# Patient Record
Sex: FEMALE | Race: WHITE | NOT HISPANIC OR LATINO | Employment: FULL TIME | URBAN - METROPOLITAN AREA
[De-identification: names, ages, dates, MRNs, and addresses within clinical notes are randomized per-mention and may not be internally consistent; named-entity substitution may affect disease eponyms.]

---

## 2017-03-30 ENCOUNTER — MYC MEDICAL ADVICE (OUTPATIENT)
Dept: INTERNAL MEDICINE | Facility: CLINIC | Age: 62
End: 2017-03-30

## 2017-09-27 DIAGNOSIS — E03.9 ACQUIRED HYPOTHYROIDISM: ICD-10-CM

## 2017-09-27 RX ORDER — LEVOTHYROXINE SODIUM 150 UG/1
TABLET ORAL
Qty: 90 TABLET | Refills: 0 | Status: SHIPPED | OUTPATIENT
Start: 2017-09-27 | End: 2017-10-06

## 2017-09-27 NOTE — TELEPHONE ENCOUNTER
Levo      Last Written Prescription Date: 9/30/16  Last Quantity: 90, # refills: 3  Last Office Visit with G, P or [facility] prescribing provider: 9/30/16   Next 5 appointments (look out 90 days)     Oct 06, 2017 10:00 AM CDT   MyChart Physical Adult with Ernestina Figueroa MD   [facility] ([facility]    [address]  [facility] [address]   [phone]                   TSH   Date Value Ref Range Status   09/30/2016 0.49 0.40 - 4.00 mU/L Final

## 2017-10-06 ENCOUNTER — OFFICE VISIT (OUTPATIENT)
Dept: INTERNAL MEDICINE | Facility: CLINIC | Age: 62
End: 2017-10-06
Payer: COMMERCIAL

## 2017-10-06 ENCOUNTER — HOSPITAL ENCOUNTER (OUTPATIENT)
Dept: MAMMOGRAPHY | Facility: CLINIC | Age: 62
Discharge: HOME | End: 2017-10-06
Attending: INTERNAL MEDICINE | Admitting: INTERNAL MEDICINE
Payer: COMMERCIAL

## 2017-10-06 VITALS
WEIGHT: 152.1 LBS | DIASTOLIC BLOOD PRESSURE: 76 MMHG | OXYGEN SATURATION: 96 % | HEIGHT: 67 IN | RESPIRATION RATE: 16 BRPM | BODY MASS INDEX: 23.87 KG/M2 | SYSTOLIC BLOOD PRESSURE: 102 MMHG | TEMPERATURE: 98.2 F | HEART RATE: 66 BPM

## 2017-10-06 DIAGNOSIS — Z00.00 ENCOUNTER FOR ROUTINE ADULT HEALTH EXAMINATION WITHOUT ABNORMAL FINDINGS: Primary | ICD-10-CM

## 2017-10-06 DIAGNOSIS — E03.9 ACQUIRED HYPOTHYROIDISM: ICD-10-CM

## 2017-10-06 DIAGNOSIS — Z12.31 VISIT FOR SCREENING MAMMOGRAM: ICD-10-CM

## 2017-10-06 DIAGNOSIS — C50.911 MALIGNANT NEOPLASM OF RIGHT FEMALE BREAST, UNSPECIFIED ESTROGEN RECEPTOR STATUS, UNSPECIFIED SITE OF BREAST (H): ICD-10-CM

## 2017-10-06 DIAGNOSIS — R10.13 EPIGASTRIC PAIN: ICD-10-CM

## 2017-10-06 PROCEDURE — 36415 COLL VENOUS BLD VENIPUNCTURE: CPT | Performed by: INTERNAL MEDICINE

## 2017-10-06 PROCEDURE — 80061 LIPID PANEL: CPT | Performed by: INTERNAL MEDICINE

## 2017-10-06 PROCEDURE — 99396 PREV VISIT EST AGE 40-64: CPT | Performed by: INTERNAL MEDICINE

## 2017-10-06 PROCEDURE — 84443 ASSAY THYROID STIM HORMONE: CPT | Performed by: INTERNAL MEDICINE

## 2017-10-06 PROCEDURE — 99213 OFFICE O/P EST LOW 20 MIN: CPT | Mod: 25 | Performed by: INTERNAL MEDICINE

## 2017-10-06 PROCEDURE — G0202 SCR MAMMO BI INCL CAD: HCPCS | Mod: 52

## 2017-10-06 PROCEDURE — 80048 BASIC METABOLIC PNL TOTAL CA: CPT | Performed by: INTERNAL MEDICINE

## 2017-10-06 RX ORDER — LEVOTHYROXINE SODIUM 150 UG/1
150 TABLET ORAL DAILY
Qty: 90 TABLET | Refills: 3 | Status: SHIPPED | OUTPATIENT
Start: 2017-10-06 | End: 2018-10-04

## 2017-10-06 NOTE — MR AVS SNAPSHOT
After Visit Summary   10/6/2017    Lety Baldwin    MRN: [id number]           Patient Information     Date Of Birth          1955        Visit Information        Provider Department      10/6/2017 10:00 AM Ernestina Figueroa MD [facility]        Today's Diagnoses     Encounter for routine adult health examination without abnormal findings    -  1    Epigastric pain        Acquired hypothyroidism        Need for prophylactic vaccination and inoculation against influenza          Care Instructions      PREVENTIVE HEALTH RECOMMENDATIONS:     Vaccines: Get a flu shot each year. Get a tetanus shot every 10 years.     Exercise for at least 150 minutes a week (an average of 30 minutes a day, 5 days of the week). This will help you control your weight and prevent disease.    Limit alcohol to one drink per day.    No smoking.     Wear sunscreen to prevent skin cancer.     See your dentist twice a year for an exam and cleaning.    Try to get Calcium 1200 mg total per day. It is best to not take it all at once. Try to get Vitamin D at least 1000 units per day.    BMI or Body Mass Index is a way of indicating weight and health risk for cardiovascular diseases, high blood pressure, diabetes.   Definitions:    Underweight is less than 18.5 and will be associated with health risk.   Normal BMI is 18.5 to 25   Overweight is 25-29   Obesity is 30 or greater   Morbid Obesity is 40 or greater or 35 or greater with diabetes or high blood pressure  Obesity and Morbid Obesity are associated with higher health risks. Lowering calories, exercising more may lower your BMI and even small decreases can have positive impact on lowering health risks.   Your Body mass index is 24 kg/(m^2)..             Follow-ups after your visit        Your next 10 appointments already scheduled     Oct 06, 2017 12:05 PM CDT   MA SCREEN WITH IMPLANTS DIGITAL BILAT with [id number]   [facility] ([facility])  "   [address] [facility], [address]  [facility] [id number] [phone]           Do not use any powder, lotion or deodorant under your arms or on your breast. If you do, we will ask you to remove it before your exam.  Wear comfortable, two-piece clothing.  If you have any allergies, tell your care team.  Bring any previous mammograms from other facilities or have them mailed to the breast center. Three-dimensional (3D) mammograms are available at [facility] locations in [facility], [facility], [facility], and [place]. [facility] locations include [place] and [facility] in [place]. Benefits of 3D mammograms include: - Improved rate of cancer detection - Decreases your chance of having to go back for more tests, which means fewer: - \"False-positive\" results (This means that there is an abnormal area but it isn't cancer.) - Invasive testing procedures, such as a biopsy or surgery - Can provide clearer images of the breast if you have dense breast tissue. 3D mammography is an optional exam that anyone can have with a 2D mammogram. It doesn't replace or take the place of a 2D mammogram. 2D mammograms remain an effective screening test for all women.  Not all insurance companies cover the cost of a 3D mammogram. Check with your insurance.              Future tests that were ordered for you today     Open Future Orders        Priority Expected Expires Ordered    US Abdomen Limited Routine  10/6/2018 10/6/2017            Who to contact     If you have questions or need follow up information about today's clinic visit or your schedule please contact [facility] directly at [phone].  Normal or non-critical lab and imaging results will be communicated to you by MyChart, letter or phone within 4 business days after the clinic has received the results. If you do not hear from us within 7 days, please contact the clinic through MyChart or phone. If " "you have a critical or abnormal lab result, we will notify you by phone as soon as possible.  Submit refill requests through [employer] or call your pharmacy and they will forward the refill request to us. Please allow 3 business days for your refill to be completed.          Additional Information About Your Visit        SmartEx[employer]ehart Information     [employer] gives you secure access to your electronic health record. If you see a primary care provider, you can also send messages to your care team and make appointments. If you have questions, please call your primary care clinic.  If you do not have a primary care provider, please call [phone] and they will assist you.        Care EveryWhere ID     This is your Care EveryWhere ID. This could be used by other organizations to access your [facility] medical records  [id number]        Your Vitals Were     Pulse Temperature Respirations Height Last Period Pulse Oximetry    66 98.2  F (36.8  C) (Oral) 16 5' 6.75\" (1.695 m) 01/01/2004 96%    BMI (Body Mass Index)                   24 kg/m2            Blood Pressure from Last 3 Encounters:   10/06/17 102/76   10/10/16 110/82   09/30/16 110/80    Weight from Last 3 Encounters:   10/06/17 152 lb 1.6 oz (69 kg)   10/10/16 156 lb (70.8 kg)   09/30/16 156 lb (70.8 kg)              We Performed the Following     Basic metabolic panel     FLU VAC, SPLIT VIRUS IM > 3 YO (QUADRIVALENT) [14348]     Lipid panel reflex to direct LDL     TSH with free T4 reflex     Vaccine Administration, Initial [51916]          Today's Medication Changes          These changes are accurate as of: 10/6/17 10:53 AM.  If you have any questions, ask your nurse or doctor.               Stop taking these medicines if you haven't already. Please contact your care team if you have questions.     Selenium 200 MCG Tabs   Stopped by:  Ernestina Figueroa MD                    Primary Care Provider Office Phone # Fax #    Ernestina Figueroa [phone]       [address] [facility] [address]  [facility] [address]        Equal Access to Services     LILIAKATERIN DYLAN : Hadii lorena eubanks hadmariangelyanni Sogray, waaxda luqadaha, qaybta kaalmada emanuelwilliamethan, jessica husaingonzaloslick bailey. So [facility] [phone].    ATENCIÓN: Si habla español, tiene a ibrahim disposición servicios gratuitos de asistencia lingüística. Llame al [phone].    We comply with applicable federal civil rights laws and [place] laws. We do not discriminate on the basis of race, color, national origin, age, disability, sex, sexual orientation, or gender identity.            Thank you!     Thank you for choosing [facility]  for your care. Our goal is always to provide you with excellent care. Hearing back from our patients is one way we can continue to improve our services. Please take a few minutes to complete the written survey that you may receive in the mail after your visit with us. Thank you!             Your Updated Medication List - Protect others around you: Learn how to safely use, store and throw away your medicines at www.disposemymeds.org.          This list is accurate as of: 10/6/17 10:53 AM.  Always use your most recent med list.                   Brand Name Dispense Instructions for use Diagnosis    ascorbic acid 250 MG tablet    VITAMIN C     1 TABLET 3 TIMES DAILY        levothyroxine 150 MCG tablet    SYNTHROID/LEVOTHROID    90 tablet    TAKE 1 TABLET (150 MCG) BY MOUTH DAILY    Acquired hypothyroidism       naproxen 500 MG tablet    NAPROSYN    60 Tab    ONE TABLET TWICE DAILY WITH FOOD as needed    Knee pain, Elbow pain       VIT CALCIUM CITRATE + D TABS 250-62.5 MG-IU OR

## 2017-10-06 NOTE — NURSING NOTE
"Chief Complaint   Patient presents with     Physical     fasting       Initial /76  Pulse 66  Temp 98.2  F (36.8  C) (Oral)  Resp 16  Ht 5' 6.75\" (1.695 m)  Wt 152 lb 1.6 oz (69 kg)  LMP 01/01/2004  SpO2 96%  BMI 24 kg/m2 Estimated body mass index is 24 kg/(m^2) as calculated from the following:    Height as of this encounter: 5' 6.75\" (1.695 m).    Weight as of this encounter: 152 lb 1.6 oz (69 kg).  Medication Reconciliation: complete      René Hobbs, CHLOE      Pt schedule for mammogram after physical today.     "

## 2017-10-06 NOTE — PATIENT INSTRUCTIONS
PREVENTIVE HEALTH RECOMMENDATIONS:     Vaccines: Get a flu shot each year. Get a tetanus shot every 10 years.     Exercise for at least 150 minutes a week (an average of 30 minutes a day, 5 days of the week). This will help you control your weight and prevent disease.    Limit alcohol to one drink per day.    No smoking.     Wear sunscreen to prevent skin cancer.     See your dentist twice a year for an exam and cleaning.    Try to get Calcium 1200 mg total per day. It is best to not take it all at once. Try to get Vitamin D at least 1000 units per day.    BMI or Body Mass Index is a way of indicating weight and health risk for cardiovascular diseases, high blood pressure, diabetes.   Definitions:    Underweight is less than 18.5 and will be associated with health risk.   Normal BMI is 18.5 to 25   Overweight is 25-29   Obesity is 30 or greater   Morbid Obesity is 40 or greater or 35 or greater with diabetes or high blood pressure  Obesity and Morbid Obesity are associated with higher health risks. Lowering calories, exercising more may lower your BMI and even small decreases can have positive impact on lowering health risks.   Your Body mass index is 24 kg/(m^2)..

## 2017-10-06 NOTE — PROGRESS NOTES
SUBJECTIVE:   CC: Lety Baldwin is an 62 year old woman who presents for preventive health visit.     Physical   Annual:     Getting at least 3 servings of Calcium per day::  Yes    Bi-annual eye exam::  Yes    Dental care twice a year::  Yes    Sleep apnea or symptoms of sleep apnea::  None    Diet::  Regular (no restrictions)    Frequency of exercise::  4-5 days/week    Duration of exercise::  30-45 minutes    Taking medications regularly::  Yes    Medication side effects::  None    Additional concerns today::  YES    Current concerns:   She has had about 4 episodes of significant epigastric pain the past year. She had one in [place] a few years ago. It is steady pain across the epigastrium, lasts 1-3 hours. No nausea or vomiting. It seems an hour or more after eating. No other specific triggers.         Today's PHQ-2 Score: PHQ-2 ( 1999 Pfizer) 10/3/2017   Q1: Little interest or pleasure in doing things 0   Q2: Feeling down, depressed or hopeless 0   PHQ-2 Score 0   Q1: Little interest or pleasure in doing things Not at all   Q2: Feeling down, depressed or hopeless Not at all   PHQ-2 Score 0       Abuse: Current or Past(Physical, Sexual or Emotional)- No  Do you feel safe in your environment - Yes    Social History   Substance Use Topics     Smoking status: Never Smoker     Smokeless tobacco: Never Used     Alcohol use No     The patient does not drink >3 drinks per day nor >7 drinks per week.    Reviewed orders with patient.  Reviewed health maintenance and updated orders accordingly - Yes      Patient over age 50, yearly due to breast cancer history      Pertinent mammograms are reviewed under the imaging tab.  History of abnormal Pap smear: NO - age 30-65 PAP every 5 years with negative HPV co-testing recommended    Reviewed and updated as needed this visit by clinical staff         Reviewed and updated as needed this visit by Provider        Patient Active Problem List   Diagnosis     Personal history of  "malignant neoplasm of breast     Acquired hypothyroidism     Disorder of bone and cartilage     CARDIOVASCULAR SCREENING; LDL GOAL LESS THAN 160     Advanced directives, counseling/discussion     Current Outpatient Prescriptions   Medication Sig Dispense Refill     levothyroxine (SYNTHROID/LEVOTHROID) 150 MCG tablet TAKE 1 TABLET (150 MCG) BY MOUTH DAILY 90 tablet 0     NAPROXEN 500 MG PO TABS ONE TABLET TWICE DAILY WITH FOOD as needed 60 Tab 0     VIT CALCIUM CITRATE + D TABS 250-62.5 MG-IU OR        VITAMIN C 250 MG OR TABS 1 TABLET 3 TIMES DAILY        Review Of Systems:  Skin: negative  Eyes: negative  Ears/Nose/Throat: negative  Respiratory: No dyspnea on exertion and No cough  Cardiovascular: negative  Gastrointestinal: as above  Genitourinary: negative  Musculoskeletal: negative  Neurologic: negative  Psychiatric: negative  Hematologic/Lymphatic/Immunologic: negative  Endocrine: negative   Gynecologic ros reveals:no breast pain or new or enlarging lumps on self exam, no vaginal bleeding, no discharge or pelvic pain    Objective:  Patient alert, in no acute distress  /76  Pulse 66  Temp 98.2  F (36.8  C) (Oral)  Resp 16  Ht 5' 6.75\" (1.695 m)  Wt 152 lb 1.6 oz (69 kg)  LMP 01/01/2004  SpO2 96%  BMI 24 kg/m2    HEENT: extraocular movements are intact, pupils equal and reactive to light and accommodation, TMs clear, oropharynx clear  NECK: Neck supple. No adenopathy. Thyroid symmetric, normal size,, Carotids without bruits.  PULMONARY: clear to auscultation  CARDIAC: regular rate and rhythm and no murmurs, clicks, or gallops  PULSES: 2/2 throughout  BACK: no spinal or CVAT  ABDOMINAL: Soft, nontender.  Normal bowel sounds.  No hepatosplenomegaly or abnormal masses  BREAST: Right sugically absent, implant intact, left no breast masses or tenderness, implant intact,  No axillary masses or tenderness and No galactorrhea  PELVIC: external genitalia normal, , bimanual exam with normal uterus and " "adnexa  REFLEXES: 2+ throughout  SKIN: unremarkable       ASSESSMENT/PLAN:   1. Encounter for routine adult health examination without abnormal findings    - Lipid panel reflex to direct LDL  - Basic metabolic panel    2. Breast cancer:   No evidence of recurrence, annual mammogram    3.  Epigastric pain  Advised her symptoms could be gallstones, the pain is very suggestive, timing after eating. Discussed gallstones, relation to fats, complications. Will do US but if symptoms are very infrequent may not refer to surgery yet. Avoid fatty foods. Advised ED for severe, unremitting pain, fever associated with the pain.   - US Abdomen Limited; Future    3. Acquired hypothyroidism  recheck  - TSH with free T4 reflex    COUNSELING:  Reviewed preventive health counseling, as reflected in patient instructions       Osteoporosis Prevention/Bone Health         reports that she has never smoked. She has never used smokeless tobacco.    Estimated body mass index is 24.43 kg/(m^2) as calculated from the following:    Height as of 10/10/16: 5' 7\" (1.702 m).    Weight as of 10/10/16: 156 lb (70.8 kg).         Counseling Resources:  ATP IV Guidelines  Pooled Cohorts Equation Calculator  Breast Cancer Risk Calculator  FRAX Risk Assessment  ICSI Preventive Guidelines  Dietary Guidelines for Americans, 2010  [employer]'s MyPlate  ASA Prophylaxis  Lung CA Screening    Ernestina Figueroa MD  [facility] for HPI/ROS submitted by the patient on 10/3/2017   PHQ-2 Score: 0        "

## 2017-10-07 LAB
ANION GAP SERPL CALCULATED.3IONS-SCNC: 9 MMOL/L (ref 3–14)
BUN SERPL-MCNC: 12 MG/DL (ref 7–30)
CALCIUM SERPL-MCNC: 9.2 MG/DL (ref 8.5–10.1)
CHLORIDE SERPL-SCNC: 103 MMOL/L (ref 94–109)
CHOLEST SERPL-MCNC: 155 MG/DL
CO2 SERPL-SCNC: 28 MMOL/L (ref 20–32)
CREAT SERPL-MCNC: 0.64 MG/DL (ref 0.52–1.04)
GFR SERPL CREATININE-BSD FRML MDRD: >90 ML/MIN/1.7M2
GLUCOSE SERPL-MCNC: 90 MG/DL (ref 70–99)
HDLC SERPL-MCNC: 80 MG/DL
LDLC SERPL CALC-MCNC: 61 MG/DL
NONHDLC SERPL-MCNC: 75 MG/DL
POTASSIUM SERPL-SCNC: 3.9 MMOL/L (ref 3.4–5.3)
SODIUM SERPL-SCNC: 140 MMOL/L (ref 133–144)
TRIGL SERPL-MCNC: 68 MG/DL
TSH SERPL DL<=0.005 MIU/L-ACNC: 0.91 MU/L (ref 0.4–4)

## 2017-10-10 ENCOUNTER — HOSPITAL ENCOUNTER (OUTPATIENT)
Dept: ULTRASOUND IMAGING | Facility: CLINIC | Age: 62
Discharge: HOME | End: 2017-10-10
Attending: INTERNAL MEDICINE | Admitting: INTERNAL MEDICINE
Payer: COMMERCIAL

## 2017-10-10 DIAGNOSIS — R10.13 EPIGASTRIC PAIN: ICD-10-CM

## 2017-10-10 PROCEDURE — 76705 ECHO EXAM OF ABDOMEN: CPT

## 2017-10-24 ENCOUNTER — MYC MEDICAL ADVICE (OUTPATIENT)
Dept: INTERNAL MEDICINE | Facility: CLINIC | Age: 62
End: 2017-10-24

## 2017-10-24 ENCOUNTER — TELEPHONE (OUTPATIENT)
Dept: INTERNAL MEDICINE | Facility: CLINIC | Age: 62
End: 2017-10-24

## 2017-10-24 DIAGNOSIS — K80.20 CALCULUS OF GALLBLADDER WITHOUT CHOLECYSTITIS WITHOUT OBSTRUCTION: Primary | ICD-10-CM

## 2017-10-24 NOTE — TELEPHONE ENCOUNTER
Patient called regarding (reason for call): Gallbladder concerns-states pain this morning which has now subsided. Would like Dr. Figueroa to advise on next steps such as surgeon referral and any course of action to take during pain.  Is this regarding a medication?: no  If yes, which medication?: na  Pt Provider?: Dr. Figueroa  Phone Number Pt can be reached at: [phone]  Best Time: any  Can we leave a detailed message on this number?: yes    Central Scheduling  Marc SCOTT

## 2017-10-30 ENCOUNTER — OFFICE VISIT (OUTPATIENT)
Dept: SURGERY | Facility: CLINIC | Age: 62
End: 2017-10-30
Payer: COMMERCIAL

## 2017-10-30 VITALS
HEART RATE: 66 BPM | SYSTOLIC BLOOD PRESSURE: 128 MMHG | HEIGHT: 67 IN | WEIGHT: 152 LBS | DIASTOLIC BLOOD PRESSURE: 80 MMHG | OXYGEN SATURATION: 96 % | BODY MASS INDEX: 23.86 KG/M2

## 2017-10-30 DIAGNOSIS — K81.1 CHRONIC CHOLECYSTITIS: Primary | ICD-10-CM

## 2017-10-30 PROCEDURE — 99204 OFFICE O/P NEW MOD 45 MIN: CPT | Performed by: SURGERY

## 2017-10-30 NOTE — PROGRESS NOTES
Chief complaint:  Abdominal pain, epigastric    HPI:  This patient is a 62 year old  female who presents with epigastric region right upper quadrant abdominal pain for the past 2 years.  The pain is intermittent.  It occurs about 4 times peer year and lasts a few hours.     Past Medical History:   has a past medical history of Disorder of bone and cartilage, unspecified (2004); Fracture of metatarsal bone of right foot (5/15); Malignant neoplasm of breast (female), unspecified site (8/97); and Other specified acquired hypothyroidism.    Past Surgical History:  Past Surgical History:   Procedure Laterality Date     C NONSPECIFIC PROCEDURE  9/00/97    Right Total Mastectomy- Reconstructive Surgery. Abstracted 5/15/02.     COLONOSCOPY N/A 10/8/2014    Procedure: COLONOSCOPY;  Surgeon: Jelani Moran MD;  Location: [facility]     HC BIOPSY OF BREAST, OPEN INCISIONAL  1992    Rt     HC DILATION/CURETTAGE DIAG/THER NON OB  1995    D & C        Social History:  Social History     Social History     Marital status:      Spouse name: N/A     Number of children: 2     Years of education: N/A     Occupational History           Social History Main Topics     Smoking status: Never Smoker     Smokeless tobacco: Never Used     Alcohol use No     Drug use: No     Sexual activity: Yes     Partners: Male     Birth control/ protection: Surgical      Comment:  has had 2 vas.     Other Topics Concern     Exercise Yes     Seat Belt Yes     Self-Exams Yes     Social History Narrative        Family History:  Family History   Problem Relation Age of Onset     DIABETES Father      Hypertension Mother      Alzheimer Disease Mother      Lipids Mother      Cancer - colorectal Mother      Colon Cancer Mother 78     Arthritis Maternal Grandmother      C.A.D. Maternal Grandmother      Cancer - colorectal Maternal Grandfather      Alzheimer Disease Maternal Grandfather      CEREBROVASCULAR DISEASE Paternal  Grandmother        Review of Systems:  The 10 point Review of Systems is negative other than noted in the HPI and above.    Physical Exam:  General - This is a well developed, well nourished female in no apparent distress.  HEENT - Normocephalic, atraumatic.  NO scleral icterus.  Neck - supple without masses  Lungs - respirations regular and non-labored.    Abdomen:   soft, non-distended with no tenderness noted. no masses palpated.  Extremities - warm without edema  Neurologic - nonfocal    Relevant labs:  Liver function panel- normal  WBC- normal  Lipase- normal    Imaging:  Ultrasound RUQ: positive cholelithiasis (multiple, small), positive gallbladder wall thickening (0.4 cm), negative ductal dilatation (0.3 cm), negative pericholecystic fluid    Assessment and Plan:  It is my impression that she has cholelithiasis and cholecystitis.  I have offered her a Laparoscopic cholecystectomy with cholangiograms.  We have discussed the indication, risks and expected recovery.  Risks discussed included duct/ organ injury, conversion to open surgery with increased recovery, post cholecystectomy syndromes and their treatment.   We also discussed alternatives including dissolution, lithotripsy and low fat diet. Literature was given to review.  We will work on scheduling surgery at the patient s convenience.    Peter Winslow MD  [facility] [place]    Please route or send letter to:  Primary Care Provider (PCP) and Include Progress Note

## 2017-10-30 NOTE — LETTER
2017    Re: Lety Baldwin - 1955    This patient is a 62 year old  female who presents with epigastric region right upper quadrant abdominal pain for the past 2 years.  The pain is intermittent.  It occurs about 4 times peer year and lasts a few hours.      Past Medical History:  has a past medical history of Disorder of bone and cartilage, unspecified (); Fracture of metatarsal bone of right foot (5/15); Malignant neoplasm of breast (female), unspecified site (); and Other specified acquired hypothyroidism.     Physical Exam:  General - This is a well developed, well nourished female in no apparent distress.  HEENT - Normocephalic, atraumatic.  NO scleral icterus.  Neck - supple without masses  Lungs - respirations regular and non-labored.    Abdomen:- soft, non-distended with no tenderness noted. no masses palpated.  Extremities - warm without edema  Neurologic - nonfocal     Relevant labs:  Liver function panel- normal  WBC- normal  Lipase- normal     Imaging:  Ultrasound RUQ: positive cholelithiasis (multiple, small), positive gallbladder wall thickening (0.4 cm), negative ductal dilatation (0.3 cm), negative pericholecystic fluid     Assessment and Plan:  It is my impression that she has cholelithiasis and cholecystitis.  I have offered her a Laparoscopic cholecystectomy with cholangiograms.  We have discussed the indication, risks and expected recovery.  Risks discussed included duct/ organ injury, conversion to open surgery with increased recovery, post cholecystectomy syndromes and their treatment.   We also discussed alternatives including dissolution, lithotripsy and low fat diet. Literature was given to review.  We will work on scheduling surgery at the patient s convenience.     Peter Winslow MD

## 2017-10-30 NOTE — PROGRESS NOTES
HPI      ROS (Review of Systems):      Positive for thyroid problem.          Physical Exam

## 2017-10-30 NOTE — MR AVS SNAPSHOT
"              After Visit Summary   10/30/2017    Lety Baldwin    MRN: [id number]           Patient Information     Date Of Birth          1955        Visit Information        Provider Department      10/30/2017 10:30 AM Petre Winslow MD [facility] General Surgery      Today's Diagnoses     Chronic cholecystitis    -  1       Follow-ups after your visit        Who to contact     If you have questions or need follow up information about today's clinic visit or your schedule please contact [facility] directly at [phone].  Normal or non-critical lab and imaging results will be communicated to you by [employer] Yourself[employer], letter or phone within 4 business days after the clinic has received the results. If you do not hear from us within 7 days, please contact the clinic through Lumicst or phone. If you have a critical or abnormal lab result, we will notify you by phone as soon as possible.  Submit refill requests through [email or web address] or call your pharmacy and they will forward the refill request to us. Please allow 3 business days for your refill to be completed.          Additional Information About Your Visit        MyChart Information     [email or web address] gives you secure access to your electronic health record. If you see a primary care provider, you can also send messages to your care team and make appointments. If you have questions, please call your primary care clinic.  If you do not have a primary care provider, please call [phone] and they will assist you.        Care EveryWhere ID     This is your Care EveryWhere ID. This could be used by other organizations to access your [place] medical records  [id number]        Your Vitals Were     Pulse Height Last Period Pulse Oximetry Breastfeeding? BMI (Body Mass Index)    66 5' 6.75\" (1.695 m) 01/01/2004 96% No 23.99 kg/m2       Blood Pressure from Last 3 Encounters:   10/30/17 128/80   10/06/17 102/76 "   10/10/16 110/82    Weight from Last 3 Encounters:   10/30/17 152 lb (68.9 kg)   10/06/17 152 lb 1.6 oz (69 kg)   10/10/16 156 lb (70.8 kg)              Today, you had the following     No orders found for display       Primary Care Provider Office Phone # Fax #    Ernestina Figueroa [phone]       [address]  [facility] [address]        Equal Access to Services     [facility] : Hadii aad ku hadasho Soomaali, waaxda luqadaha, qaybta kaalmada adeegyada, waxay idiin hayaan adeeg rodriguearaslick lajerzy . So [facility] [phone].    ATENCIÓN: Si habla español, tiene a ibrahim disposición servicios gratuitos de asistencia lingüística. [facility] [phone].    We comply with applicable federal civil rights laws and [place] laws. We do not discriminate on the basis of race, color, national origin, age, disability, sex, sexual orientation, or gender identity.            Thank you!     Thank you for choosing [facility]  for your care. Our goal is always to provide you with excellent care. Hearing back from our patients is one way we can continue to improve our services. Please take a few minutes to complete the written survey that you may receive in the mail after your visit with us. Thank you!             Your Updated Medication List - Protect others around you: Learn how to safely use, store and throw away your medicines at www.disposemymeds.org.          This list is accurate as of: 10/30/17 11:25 AM.  Always use your most recent med list.                   Brand Name Dispense Instructions for use Diagnosis    ascorbic acid 250 MG tablet    VITAMIN C     1 TABLET 3 TIMES DAILY        levothyroxine 150 MCG tablet    SYNTHROID/LEVOTHROID    90 tablet    Take 1 tablet (150 mcg) by mouth daily    Acquired hypothyroidism       naproxen 500 MG tablet    NAPROSYN    60 Tab    ONE TABLET TWICE DAILY WITH FOOD as needed    Knee pain, Elbow pain       VIT CALCIUM CITRATE + D TABS 250-62.5 MG-IU  OR

## 2018-01-11 ENCOUNTER — TRANSFERRED RECORDS (OUTPATIENT)
Dept: HEALTH INFORMATION MANAGEMENT | Facility: CLINIC | Age: 63
End: 2018-01-11

## 2018-01-16 ENCOUNTER — OFFICE VISIT (OUTPATIENT)
Dept: INTERNAL MEDICINE | Facility: CLINIC | Age: 63
End: 2018-01-16
Payer: COMMERCIAL

## 2018-01-16 VITALS
DIASTOLIC BLOOD PRESSURE: 62 MMHG | RESPIRATION RATE: 16 BRPM | HEART RATE: 82 BPM | TEMPERATURE: 97.9 F | BODY MASS INDEX: 24.25 KG/M2 | WEIGHT: 154.5 LBS | OXYGEN SATURATION: 100 % | SYSTOLIC BLOOD PRESSURE: 122 MMHG | HEIGHT: 67 IN

## 2018-01-16 DIAGNOSIS — C50.911 MALIGNANT NEOPLASM OF RIGHT FEMALE BREAST, UNSPECIFIED ESTROGEN RECEPTOR STATUS, UNSPECIFIED SITE OF BREAST (H): ICD-10-CM

## 2018-01-16 DIAGNOSIS — K81.1 CHRONIC CHOLECYSTITIS: ICD-10-CM

## 2018-01-16 DIAGNOSIS — Z01.818 PREOP GENERAL PHYSICAL EXAM: Primary | ICD-10-CM

## 2018-01-16 PROCEDURE — 99214 OFFICE O/P EST MOD 30 MIN: CPT | Performed by: INTERNAL MEDICINE

## 2018-01-16 PROCEDURE — 93000 ELECTROCARDIOGRAM COMPLETE: CPT | Performed by: INTERNAL MEDICINE

## 2018-01-16 RX ORDER — CEFAZOLIN SODIUM 1 G/3ML
1 INJECTION, POWDER, FOR SOLUTION INTRAMUSCULAR; INTRAVENOUS SEE ADMIN INSTRUCTIONS
Status: CANCELLED | OUTPATIENT
Start: 2018-01-16

## 2018-01-16 NOTE — MR AVS SNAPSHOT
After Visit Summary   1/16/2018    Lety Baldwin    MRN: [id number]           Patient Information     Date Of Birth          1955        Visit Information        Provider Department      1/16/2018 8:20 AM Ernestina Figueroa MD [facility]        Today's Diagnoses     Preop general physical exam    -  1      Care Instructions      Before Your Surgery      Call your surgeon if there is any change in your health. This includes signs of a cold or flu (such as a sore throat, runny nose, cough, rash or fever).    Do not smoke, drink alcohol or take over the counter medicine (unless your surgeon or primary care doctor tells you to) for the 24 hours before and after surgery.    If you take prescribed drugs: Follow your doctor s orders about which medicines to take and which to stop until after surgery.    Eating and drinking prior to surgery: follow the instructions from your surgeon    Take a shower or bath the night before surgery. Use the soap your surgeon gave you to gently clean your skin. If you do not have soap from your surgeon, use your regular soap. Do not shave or scrub the surgery site.  Wear clean pajamas and have clean sheets on your bed.           Follow-ups after your visit        Your next 10 appointments already scheduled     Jan 18, 2018   Procedure with Derrell Diaz MD   [facility] PeriOP [facility] (--)    [address]  [facility] [address]   [phone]            Jan 18, 2018 11:30 AM CST   [facility] Same Day Surgery with Peter Winslow MD, Jennifer Vallejo PA-C   [facility] ([facility])    [facility])   [phone]              Who to contact     If you have questions or need follow up information about today's clinic visit or your schedule please contact [facility] directly at [phone].  Normal or non-critical  "lab and imaging results will be communicated to you by MyChart, letter or phone within 4 business days after the clinic has received the results. If you do not hear from us within 7 days, please contact the clinic through Sp[employer] or phone. If you have a critical or abnormal lab result, we will notify you by phone as soon as possible.  Submit refill requests through Vocalytics or call your pharmacy and they will forward the refill request to us. Please allow 3 business days for your refill to be completed.          Additional Information About Your Visit        WishGenieharActacell Information     V[employer] gives you secure access to your electronic health record. If you see a primary care provider, you can also send messages to your care team and make appointments. If you have questions, please call your primary care clinic.  If you do not have a primary care provider, please call [phone] and they will assist you.        Care EveryWhere ID     This is your Care EveryWhere ID. This could be used by other organizations to access your [place] medical records  [id number]        Your Vitals Were     Pulse Temperature Respirations Height Last Period Pulse Oximetry    82 97.9  F (36.6  C) (Oral) 16 5' 6.75\" (1.695 m) 01/01/2004 100%    BMI (Body Mass Index)                   24.38 kg/m2            Blood Pressure from Last 3 Encounters:   01/16/18 122/62   10/30/17 128/80   10/06/17 102/76    Weight from Last 3 Encounters:   01/16/18 154 lb 8 oz (70.1 kg)   10/30/17 152 lb (68.9 kg)   10/06/17 152 lb 1.6 oz (69 kg)              We Performed the Following     EKG 12-lead complete w/read - Clinics        Primary Care Provider Office Phone # Fax #    Ernestina Figueroa [phone]       [address] [place] [address]        Equal Access to Services     [facility] DYLAN : Isa mikeo Clari, waaxda luqadaha, qaybta kaalmada emanuelyaethan, jessica bailey. So olu " [phone].    ATENCIÓN: Si angelica hale, tiene a ibrahim disposición servicios gratuitos de asistencia lingüística. Brendon albarado [phone].    We comply with applicable federal civil rights laws and [place] laws. We do not discriminate on the basis of race, color, national origin, age, disability, sex, sexual orientation, or gender identity.            Thank you!     Thank you for choosing [facility]  for your care. Our goal is always to provide you with excellent care. Hearing back from our patients is one way we can continue to improve our services. Please take a few minutes to complete the written survey that you may receive in the mail after your visit with us. Thank you!             Your Updated Medication List - Protect others around you: Learn how to safely use, store and throw away your medicines at www.disposemymeds.org.          This list is accurate as of: 1/16/18  9:13 AM.  Always use your most recent med list.                   Brand Name Dispense Instructions for use Diagnosis    ascorbic acid 250 MG tablet    VITAMIN C     1 TABLET 3 TIMES DAILY        levothyroxine 150 MCG tablet    SYNTHROID/LEVOTHROID    90 tablet    Take 1 tablet (150 mcg) by mouth daily    Acquired hypothyroidism       naproxen 500 MG tablet    NAPROSYN    60 Tab    ONE TABLET TWICE DAILY WITH FOOD as needed    Knee pain, Elbow pain       VIT CALCIUM CITRATE + D TABS 250-62.5 MG-IU OR

## 2018-01-16 NOTE — PROGRESS NOTES
[place] [address]  [facility] [address]  [phone]  Dept: [phone]    PRE-OP EVALUATION:  Today's date: 2018    Lety Baldwin (: 1955) presents for pre-operative evaluation assessment as requested by Dr. Diaz and Dr. Sultana.  She requires evaluation and anesthesia risk assessment prior to undergoing surgery/procedure for treatment of cholecystitis and need to replace breast implants .  Proposed procedure: lap rebecca and  COMBINED RECONSTRUCT BREAST BILATERAL, IMPLANT PROSTHESIS BILATERAL    Date of Surgery/ Procedure: 2018  Time of Surgery/ Procedure: 11:30 am  Hospital/Surgical Facility: [facility]   Primary Physician: Ernestina Figueroa  Type of Anesthesia Anticipated: general anesthesia    Patient has a Health Care Directive or Living Will:  NO    1. NO - Do you have a history of heart attack, stroke, stent, bypass or surgery on an artery in the head, neck, heart or legs?  2. NO - Do you ever have any pain or discomfort in your chest?  3. NO - Do you have a history of  Heart Failure?  4. NO - Are you troubled by shortness of breath when: walking on the level, up a slight hill or at night?  5. NO - Do you currently have a cold, bronchitis or other respiratory infection?  6. NO - Do you have a cough, shortness of breath or wheezing?  7. NO - Do you sometimes get pains in the calves of your legs when you walk?  8. NO - Do you or anyone in your family have previous history of blood clots?  9. NO - Do you or does anyone in your family have a serious bleeding problem such as prolonged bleeding following surgeries or cuts?  10. NO - Have you ever had problems with anemia or been told to take iron pills?  11. NO - Have you had any abnormal blood loss such as black, tarry or bloody stools, or abnormal vaginal bleeding?  12. NO - Have you ever had a blood transfusion?  13. YES - HAVE YOU OR ANY OF YOUR RELATIVES EVER HAD PROBLEMS WITH ANESTHESIA? Severe nausea and  oversedation  14. NO - Do you have sleep apnea, excessive snoring or daytime drowsiness?  15. NO - Do you have any prosthetic heart valves?  16. NO - Do you have prosthetic joints?  17. NO - Is there any chance that you may be pregnant?      HPI:                                                      Brief HPI related to upcoming procedure: she has chronic cholecystitis with episodic symptoms. She has breast implants after mastectomies and will have the implants changed.       See problem list for active medical problems.  Problems all longstanding and stable, except as noted/documented.  See ROS for pertinent symptoms related to these conditions.                                                                                                  .    MEDICAL HISTORY:                                                    Patient Active Problem List    Diagnosis Date Noted     Advanced directives, counseling/discussion 09/28/2015     Priority: Medium     Information given to pt to take home and review. 9/28/15       CARDIOVASCULAR SCREENING; LDL GOAL LESS THAN 160 10/31/2010     Priority: Medium     Disorder of bone and cartilage      Priority: Medium     -2.0 in 2007  Problem list name updated by automated process. Provider to review       Breast cancer (H) 08/05/2003     Priority: Medium     Acquired hypothyroidism 08/05/2003     Priority: Medium     Problem list name updated by automated process. Provider to review        Past Medical History:   Diagnosis Date     Disorder of bone and cartilage, unspecified 2004    -2.0 in 2007     Fracture of metatarsal bone of right foot 5/15     Malignant neoplasm of breast (female), unspecified site 8/97    Ductal Carcinoma in situ right     Other specified acquired hypothyroidism      Past Surgical History:   Procedure Laterality Date     C NONSPECIFIC PROCEDURE  9/00/97    Right Total Mastectomy- Reconstructive Surgery. Abstracted 5/15/02.     COLONOSCOPY N/A 10/8/2014    Procedure:  "COLONOSCOPY;  Surgeon: Jelani Moran MD;  Location:  GI     HC BIOPSY OF BREAST, OPEN INCISIONAL  1992    Rt     HC DILATION/CURETTAGE DIAG/THER NON OB  1995    D & C     Current Outpatient Prescriptions   Medication Sig Dispense Refill     levothyroxine (SYNTHROID/LEVOTHROID) 150 MCG tablet Take 1 tablet (150 mcg) by mouth daily 90 tablet 3     NAPROXEN 500 MG PO TABS ONE TABLET TWICE DAILY WITH FOOD as needed 60 Tab 0     VIT CALCIUM CITRATE + D TABS 250-62.5 MG-IU OR        VITAMIN C 250 MG OR TABS 1 TABLET 3 TIMES DAILY       NSAID will be held a week prior  OTC products: None, except as noted above    Allergies   Allergen Reactions     Codeine      Fesoterodine      Dizziness, dry mouth      Latex Allergy: NO    Social History   Substance Use Topics     Smoking status: Never Smoker     Smokeless tobacco: Never Used     Alcohol use No     History   Drug Use No       REVIEW OF SYSTEMS:                                                    GENERAL: negative for, fever, chills, weight loss, weight gain  EYES: negative  ENT: negative  RESPIRATORY: No dyspnea on exertion and No cough  CARDIOVASCULAR: negative for, palpitations, tachycardia and chest pain  GI: negative for, nausea, vomiting, abdominal pain, melena, hematochezia, last gallbladder episode was October  : negative for, dysuria and hematuria  MUSCULOSKELETAL: negative  NEUROLOGIC: negative for, headaches, seizures, local weakness, numbness or tingling of hands and numbness or tingling of feet  SKIN: skin abrasion leg from shaving, healing  ENDOCRINE: negative       EXAM:                                                      Patient is alert, oriented, cooperative in no acute distress.  /62  Pulse 82  Temp 97.9  F (36.6  C) (Oral)  Resp 16  Ht 5' 6.75\" (1.695 m)  Wt 154 lb 8 oz (70.1 kg)  LMP 01/01/2004  SpO2 100%  BMI 24.38 kg/m2    HEENT: PERRL, EOMI, TM's are normal. Oropharynx is clear.  NECK: No lymphadenopathy or thyromegaly. " Carotid pulses full without bruits.  LUNGS: clear  CV: normal S1, S2 without murmur, S3 or S4 present. Pulses are 2/2 throughout. No JVD.  ABDOMEN: Bowel sounds present, nontender without hepatosplenomegaly. Liver is normal size to percussion.  EXTREMITIES: no edema present, unremarkable joints  NEUROLOGIC: Cranial nerves II-XII intact, reflexes 2/4 throughout, strength 5/5, sensation grossly intact, gait normal.  SKIN: without rashes or significant lesions     DIAGNOSTICS:                                                    EKG: appears normal, NSR, normal axis, normal intervals, no acute ST/T changes c/w ischemia, no LVH by voltage criteria    Recent Labs   Lab Test  10/06/17   1106  09/30/16   0949   NA  140  138   POTASSIUM  3.9  4.1   CR  0.64  0.70        IMPRESSION:                                                    Reason for surgery/procedure: cholecystitis, need to change implants  Diagnosis/reason for consult: preop    The proposed surgical procedure is considered LOW risk.    REVISED CARDIAC RISK INDEX  The patient has the following serious cardiovascular risks for perioperative complications such as (MI, PE, VFib and 3  AV Block):  No serious cardiac risks  INTERPRETATION: 0 risks: Class I (very low risk - 0.4% complication rate)    The patient has the following additional risks for perioperative complications:  No identified additional risks      ICD-10-CM    1. Preop general physical exam Z01.818 EKG 12-lead complete w/read - Clinics   2. Chronic cholecystitis K81.1    3. Malignant neoplasm of right female breast, unspecified estrogen receptor status, unspecified site of breast (H) C50.911        RECOMMENDATIONS:                                                          --Patient is to take all scheduled medications on the day of surgery EXCEPT for modifications listed below.  Hold nsaid            APPROVAL GIVEN to proceed with proposed procedure, without further diagnostic evaluation       Signed  Electronically by: Ernestina Figueroa MD    Copy of this evaluation report is provided to requesting physician.    [place] Preop Guidelines

## 2018-01-16 NOTE — INTERVAL H&P NOTE
This note is for the purpose of making the H&P performed in the clinic within the last 30 days available in the hospital surgical encounter.This note is for the purpose of making the H&P performed in the clinic within the last 30 days available in the hospital surgical encounter.

## 2018-01-16 NOTE — H&P (VIEW-ONLY)
[place] [address]  [facility] [address]  [phone]  Dept: [phone]    PRE-OP EVALUATION:  Today's date: 2018    Lety Baldwin (: 1955) presents for pre-operative evaluation assessment as requested by Dr. Diaz and Dr. Sultana.  She requires evaluation and anesthesia risk assessment prior to undergoing surgery/procedure for treatment of cholecystitis and need to replace breast implants .  Proposed procedure: lap rebecca and  COMBINED RECONSTRUCT BREAST BILATERAL, IMPLANT PROSTHESIS BILATERAL    Date of Surgery/ Procedure: 2018  Time of Surgery/ Procedure: 11:30 am  Hospital/Surgical Facility: [facility]   Primary Physician: Ernestina Figueroa  Type of Anesthesia Anticipated: general anesthesia    Patient has a Health Care Directive or Living Will:  NO    1. NO - Do you have a history of heart attack, stroke, stent, bypass or surgery on an artery in the head, neck, heart or legs?  2. NO - Do you ever have any pain or discomfort in your chest?  3. NO - Do you have a history of  Heart Failure?  4. NO - Are you troubled by shortness of breath when: walking on the level, up a slight hill or at night?  5. NO - Do you currently have a cold, bronchitis or other respiratory infection?  6. NO - Do you have a cough, shortness of breath or wheezing?  7. NO - Do you sometimes get pains in the calves of your legs when you walk?  8. NO - Do you or anyone in your family have previous history of blood clots?  9. NO - Do you or does anyone in your family have a serious bleeding problem such as prolonged bleeding following surgeries or cuts?  10. NO - Have you ever had problems with anemia or been told to take iron pills?  11. NO - Have you had any abnormal blood loss such as black, tarry or bloody stools, or abnormal vaginal bleeding?  12. NO - Have you ever had a blood transfusion?  13. YES - HAVE YOU OR ANY OF YOUR RELATIVES EVER HAD PROBLEMS WITH ANESTHESIA? Severe nausea and  oversedation  14. NO - Do you have sleep apnea, excessive snoring or daytime drowsiness?  15. NO - Do you have any prosthetic heart valves?  16. NO - Do you have prosthetic joints?  17. NO - Is there any chance that you may be pregnant?      HPI:                                                      Brief HPI related to upcoming procedure: she has chronic cholecystitis with episodic symptoms. She has breast implants after mastectomies and will have the implants changed.       See problem list for active medical problems.  Problems all longstanding and stable, except as noted/documented.  See ROS for pertinent symptoms related to these conditions.                                                                                                  .    MEDICAL HISTORY:                                                    Patient Active Problem List    Diagnosis Date Noted     Advanced directives, counseling/discussion 09/28/2015     Priority: Medium     Information given to pt to take home and review. 9/28/15       CARDIOVASCULAR SCREENING; LDL GOAL LESS THAN 160 10/31/2010     Priority: Medium     Disorder of bone and cartilage      Priority: Medium     -2.0 in 2007  Problem list name updated by automated process. Provider to review       Breast cancer (H) 08/05/2003     Priority: Medium     Acquired hypothyroidism 08/05/2003     Priority: Medium     Problem list name updated by automated process. Provider to review        Past Medical History:   Diagnosis Date     Disorder of bone and cartilage, unspecified 2004    -2.0 in 2007     Fracture of metatarsal bone of right foot 5/15     Malignant neoplasm of breast (female), unspecified site 8/97    Ductal Carcinoma in situ right     Other specified acquired hypothyroidism      Past Surgical History:   Procedure Laterality Date     C NONSPECIFIC PROCEDURE  9/00/97    Right Total Mastectomy- Reconstructive Surgery. Abstracted 5/15/02.     COLONOSCOPY N/A 10/8/2014    Procedure:  "COLONOSCOPY;  Surgeon: Jelani Moran MD;  Location:  GI     HC BIOPSY OF BREAST, OPEN INCISIONAL  1992    Rt     HC DILATION/CURETTAGE DIAG/THER NON OB  1995    D & C     Current Outpatient Prescriptions   Medication Sig Dispense Refill     levothyroxine (SYNTHROID/LEVOTHROID) 150 MCG tablet Take 1 tablet (150 mcg) by mouth daily 90 tablet 3     NAPROXEN 500 MG PO TABS ONE TABLET TWICE DAILY WITH FOOD as needed 60 Tab 0     VIT CALCIUM CITRATE + D TABS 250-62.5 MG-IU OR        VITAMIN C 250 MG OR TABS 1 TABLET 3 TIMES DAILY       NSAID will be held a week prior  OTC products: None, except as noted above    Allergies   Allergen Reactions     Codeine      Fesoterodine      Dizziness, dry mouth      Latex Allergy: NO    Social History   Substance Use Topics     Smoking status: Never Smoker     Smokeless tobacco: Never Used     Alcohol use No     History   Drug Use No       REVIEW OF SYSTEMS:                                                    GENERAL: negative for, fever, chills, weight loss, weight gain  EYES: negative  ENT: negative  RESPIRATORY: No dyspnea on exertion and No cough  CARDIOVASCULAR: negative for, palpitations, tachycardia and chest pain  GI: negative for, nausea, vomiting, abdominal pain, melena, hematochezia, last gallbladder episode was October  : negative for, dysuria and hematuria  MUSCULOSKELETAL: negative  NEUROLOGIC: negative for, headaches, seizures, local weakness, numbness or tingling of hands and numbness or tingling of feet  SKIN: skin abrasion leg from shaving, healing  ENDOCRINE: negative       EXAM:                                                      Patient is alert, oriented, cooperative in no acute distress.  /62  Pulse 82  Temp 97.9  F (36.6  C) (Oral)  Resp 16  Ht 5' 6.75\" (1.695 m)  Wt 154 lb 8 oz (70.1 kg)  LMP 01/01/2004  SpO2 100%  BMI 24.38 kg/m2    HEENT: PERRL, EOMI, TM's are normal. Oropharynx is clear.  NECK: No lymphadenopathy or thyromegaly. " Carotid pulses full without bruits.  LUNGS: clear  CV: normal S1, S2 without murmur, S3 or S4 present. Pulses are 2/2 throughout. No JVD.  ABDOMEN: Bowel sounds present, nontender without hepatosplenomegaly. Liver is normal size to percussion.  EXTREMITIES: no edema present, unremarkable joints  NEUROLOGIC: Cranial nerves II-XII intact, reflexes 2/4 throughout, strength 5/5, sensation grossly intact, gait normal.  SKIN: without rashes or significant lesions     DIAGNOSTICS:                                                    EKG: appears normal, NSR, normal axis, normal intervals, no acute ST/T changes c/w ischemia, no LVH by voltage criteria    Recent Labs   Lab Test  10/06/17   1106  09/30/16   0949   NA  140  138   POTASSIUM  3.9  4.1   CR  0.64  0.70        IMPRESSION:                                                    Reason for surgery/procedure: cholecystitis, need to change implants  Diagnosis/reason for consult: preop    The proposed surgical procedure is considered LOW risk.    REVISED CARDIAC RISK INDEX  The patient has the following serious cardiovascular risks for perioperative complications such as (MI, PE, VFib and 3  AV Block):  No serious cardiac risks  INTERPRETATION: 0 risks: Class I (very low risk - 0.4% complication rate)    The patient has the following additional risks for perioperative complications:  No identified additional risks      ICD-10-CM    1. Preop general physical exam Z01.818 EKG 12-lead complete w/read - Clinics   2. Chronic cholecystitis K81.1    3. Malignant neoplasm of right female breast, unspecified estrogen receptor status, unspecified site of breast (H) C50.911        RECOMMENDATIONS:                                                          --Patient is to take all scheduled medications on the day of surgery EXCEPT for modifications listed below.  Hold nsaid            APPROVAL GIVEN to proceed with proposed procedure, without further diagnostic evaluation       Signed  Electronically by: Ernestina Figueroa MD    Copy of this evaluation report is provided to requesting physician.    [facility] Preop Guidelines

## 2018-01-16 NOTE — NURSING NOTE
"Chief Complaint   Patient presents with     Pre-Op Exam       Initial /62  Pulse 82  Temp 97.9  F (36.6  C) (Oral)  Resp 16  Ht 5' 6.75\" (1.695 m)  Wt 154 lb 8 oz (70.1 kg)  LMP 01/01/2004  SpO2 100%  BMI 24.38 kg/m2 Estimated body mass index is 24.38 kg/(m^2) as calculated from the following:    Height as of this encounter: 5' 6.75\" (1.695 m).    Weight as of this encounter: 154 lb 8 oz (70.1 kg).  Medication Reconciliation: complete      René Hobbs CMA      "

## 2018-01-18 ENCOUNTER — ANESTHESIA EVENT (OUTPATIENT)
Dept: SURGERY | Facility: CLINIC | Age: 63
End: 2018-01-18
Payer: COMMERCIAL

## 2018-01-18 ENCOUNTER — HOSPITAL ENCOUNTER (OUTPATIENT)
Facility: CLINIC | Age: 63
Discharge: HOME | End: 2018-01-20
Attending: PLASTIC SURGERY | Admitting: SURGERY
Payer: COMMERCIAL

## 2018-01-18 ENCOUNTER — APPOINTMENT (OUTPATIENT)
Dept: GENERAL RADIOLOGY | Facility: CLINIC | Age: 63
End: 2018-01-18
Attending: PLASTIC SURGERY
Payer: COMMERCIAL

## 2018-01-18 ENCOUNTER — APPOINTMENT (OUTPATIENT)
Dept: SURGERY | Facility: PHYSICIAN GROUP | Age: 63
End: 2018-01-18
Payer: COMMERCIAL

## 2018-01-18 ENCOUNTER — ANESTHESIA (OUTPATIENT)
Dept: SURGERY | Facility: CLINIC | Age: 63
End: 2018-01-18
Payer: COMMERCIAL

## 2018-01-18 DIAGNOSIS — K81.9 CHOLECYSTITIS: ICD-10-CM

## 2018-01-18 DIAGNOSIS — Z98.890 S/P BREAST RECONSTRUCTION, RIGHT: Primary | ICD-10-CM

## 2018-01-18 DIAGNOSIS — K80.50 CHOLEDOCHOLITHIASIS: ICD-10-CM

## 2018-01-18 DIAGNOSIS — K80.10 CALCULUS OF GALLBLADDER WITH CHRONIC CHOLECYSTITIS WITHOUT OBSTRUCTION: ICD-10-CM

## 2018-01-18 PROCEDURE — 25000128 H RX IP 250 OP 636: Performed by: SURGERY

## 2018-01-18 PROCEDURE — 88300 SURGICAL PATH GROSS: CPT | Mod: 26 | Performed by: SURGERY

## 2018-01-18 PROCEDURE — 88304 TISSUE EXAM BY PATHOLOGIST: CPT | Mod: 26 | Performed by: SURGERY

## 2018-01-18 PROCEDURE — 40000936 ZZH STATISTIC OUTPATIENT (NON-OBS) NIGHT

## 2018-01-18 PROCEDURE — 36000065 ZZH SURGERY LEVEL 4 W FLUORO 1ST 30 MIN: Performed by: PLASTIC SURGERY

## 2018-01-18 PROCEDURE — 40000935 ZZH STATISTIC OUTPATIENT (NON-OBS) EVE

## 2018-01-18 PROCEDURE — 27210995 ZZH RX 272: Performed by: PLASTIC SURGERY

## 2018-01-18 PROCEDURE — 36000063 ZZH SURGERY LEVEL 4 EA 15 ADDTL MIN: Performed by: PLASTIC SURGERY

## 2018-01-18 PROCEDURE — 25000128 H RX IP 250 OP 636: Performed by: NURSE ANESTHETIST, CERTIFIED REGISTERED

## 2018-01-18 PROCEDURE — 47563 LAPARO CHOLECYSTECTOMY/GRAPH: CPT | Performed by: SURGERY

## 2018-01-18 PROCEDURE — 71000013 ZZH RECOVERY PHASE 1 LEVEL 1 EA ADDTL HR: Performed by: PLASTIC SURGERY

## 2018-01-18 PROCEDURE — 88300 SURGICAL PATH GROSS: CPT | Performed by: SURGERY

## 2018-01-18 PROCEDURE — 36415 COLL VENOUS BLD VENIPUNCTURE: CPT | Performed by: INTERNAL MEDICINE

## 2018-01-18 PROCEDURE — 37000008 ZZH ANESTHESIA TECHNICAL FEE, 1ST 30 MIN: Performed by: PLASTIC SURGERY

## 2018-01-18 PROCEDURE — 25000125 ZZHC RX 250: Performed by: PLASTIC SURGERY

## 2018-01-18 PROCEDURE — 71000012 ZZH RECOVERY PHASE 1 LEVEL 1 FIRST HR: Performed by: PLASTIC SURGERY

## 2018-01-18 PROCEDURE — 25000132 ZZH RX MED GY IP 250 OP 250 PS 637: Performed by: SURGERY

## 2018-01-18 PROCEDURE — 47563 LAPARO CHOLECYSTECTOMY/GRAPH: CPT | Mod: AS | Performed by: PHYSICIAN ASSISTANT

## 2018-01-18 PROCEDURE — L8600 IMPLANT BREAST SILICONE/EQ: HCPCS | Performed by: PLASTIC SURGERY

## 2018-01-18 PROCEDURE — 27210794 ZZH OR GENERAL SUPPLY STERILE: Performed by: PLASTIC SURGERY

## 2018-01-18 PROCEDURE — 27210995 ZZH RX 272: Performed by: SURGERY

## 2018-01-18 PROCEDURE — 25000125 ZZHC RX 250: Performed by: NURSE ANESTHETIST, CERTIFIED REGISTERED

## 2018-01-18 PROCEDURE — 40000170 ZZH STATISTIC PRE-PROCEDURE ASSESSMENT II: Performed by: PLASTIC SURGERY

## 2018-01-18 PROCEDURE — 88304 TISSUE EXAM BY PATHOLOGIST: CPT | Performed by: SURGERY

## 2018-01-18 PROCEDURE — 40000277 XR SURGERY CARM FLUORO LESS THAN 5 MIN W STILLS

## 2018-01-18 PROCEDURE — 25000128 H RX IP 250 OP 636: Performed by: PLASTIC SURGERY

## 2018-01-18 PROCEDURE — 37000009 ZZH ANESTHESIA TECHNICAL FEE, EACH ADDTL 15 MIN: Performed by: PLASTIC SURGERY

## 2018-01-18 PROCEDURE — 82565 ASSAY OF CREATININE: CPT | Performed by: INTERNAL MEDICINE

## 2018-01-18 PROCEDURE — 25800025 ZZH RX 258: Performed by: SURGERY

## 2018-01-18 DEVICE — IMPLANTABLE DEVICE: Type: IMPLANTABLE DEVICE | Site: BREAST | Status: FUNCTIONAL

## 2018-01-18 RX ORDER — BUPIVACAINE HYDROCHLORIDE 2.5 MG/ML
INJECTION, SOLUTION EPIDURAL; INFILTRATION; INTRACAUDAL
Status: DISCONTINUED
Start: 2018-01-18 | End: 2018-01-18 | Stop reason: HOSPADM

## 2018-01-18 RX ORDER — ONDANSETRON 4 MG/1
4 TABLET, ORALLY DISINTEGRATING ORAL EVERY 6 HOURS PRN
Status: DISCONTINUED | OUTPATIENT
Start: 2018-01-18 | End: 2018-01-20 | Stop reason: HOSPADM

## 2018-01-18 RX ORDER — OXYCODONE HYDROCHLORIDE 5 MG/1
5-10 TABLET ORAL
Qty: 30 TABLET | Refills: 0 | Status: SHIPPED | OUTPATIENT
Start: 2018-01-18 | End: 2018-01-25

## 2018-01-18 RX ORDER — SODIUM CHLORIDE 9 MG/ML
INJECTION, SOLUTION INTRAVENOUS CONTINUOUS
Status: DISCONTINUED | OUTPATIENT
Start: 2018-01-18 | End: 2018-01-20 | Stop reason: HOSPADM

## 2018-01-18 RX ORDER — PHYSOSTIGMINE SALICYLATE 1 MG/ML
1.2 INJECTION INTRAVENOUS
Status: DISCONTINUED | OUTPATIENT
Start: 2018-01-18 | End: 2018-01-18 | Stop reason: HOSPADM

## 2018-01-18 RX ORDER — OXYCODONE AND ACETAMINOPHEN 5; 325 MG/1; MG/1
1 TABLET ORAL
Status: DISCONTINUED | OUTPATIENT
Start: 2018-01-18 | End: 2018-01-18

## 2018-01-18 RX ORDER — ONDANSETRON 4 MG/1
4 TABLET, FILM COATED ORAL EVERY 6 HOURS PRN
Qty: 10 TABLET | Refills: 0 | Status: SHIPPED | OUTPATIENT
Start: 2018-01-18 | End: 2018-01-25

## 2018-01-18 RX ORDER — NEOSTIGMINE METHYLSULFATE 1 MG/ML
VIAL (ML) INJECTION PRN
Status: DISCONTINUED | OUTPATIENT
Start: 2018-01-18 | End: 2018-01-18

## 2018-01-18 RX ORDER — KETOROLAC TROMETHAMINE 30 MG/ML
30 INJECTION, SOLUTION INTRAMUSCULAR; INTRAVENOUS ONCE
Status: COMPLETED | OUTPATIENT
Start: 2018-01-18 | End: 2018-01-18

## 2018-01-18 RX ORDER — CEFAZOLIN SODIUM 1 G
1 VIAL (EA) INJECTION SEE ADMIN INSTRUCTIONS
Status: DISCONTINUED | OUTPATIENT
Start: 2018-01-18 | End: 2018-01-18 | Stop reason: HOSPADM

## 2018-01-18 RX ORDER — OXYCODONE HYDROCHLORIDE 5 MG/1
5-10 TABLET ORAL
Status: DISCONTINUED | OUTPATIENT
Start: 2018-01-18 | End: 2018-01-20 | Stop reason: HOSPADM

## 2018-01-18 RX ORDER — OXYCODONE AND ACETAMINOPHEN 5; 325 MG/1; MG/1
1-2 TABLET ORAL EVERY 4 HOURS PRN
Qty: 30 TABLET | Refills: 0 | Status: SHIPPED | OUTPATIENT
Start: 2018-01-18 | End: 2018-01-25

## 2018-01-18 RX ORDER — ACETAMINOPHEN 325 MG/1
650 TABLET ORAL EVERY 6 HOURS PRN
Status: DISCONTINUED | OUTPATIENT
Start: 2018-01-18 | End: 2018-01-20 | Stop reason: HOSPADM

## 2018-01-18 RX ORDER — SODIUM CHLORIDE, SODIUM LACTATE, POTASSIUM CHLORIDE, CALCIUM CHLORIDE 600; 310; 30; 20 MG/100ML; MG/100ML; MG/100ML; MG/100ML
INJECTION, SOLUTION INTRAVENOUS CONTINUOUS PRN
Status: DISCONTINUED | OUTPATIENT
Start: 2018-01-18 | End: 2018-01-18

## 2018-01-18 RX ORDER — FENTANYL CITRATE 50 UG/ML
25-50 INJECTION, SOLUTION INTRAMUSCULAR; INTRAVENOUS
Status: DISCONTINUED | OUTPATIENT
Start: 2018-01-18 | End: 2018-01-18 | Stop reason: HOSPADM

## 2018-01-18 RX ORDER — GLYCOPYRROLATE 0.2 MG/ML
INJECTION, SOLUTION INTRAMUSCULAR; INTRAVENOUS PRN
Status: DISCONTINUED | OUTPATIENT
Start: 2018-01-18 | End: 2018-01-18

## 2018-01-18 RX ORDER — DEXAMETHASONE SODIUM PHOSPHATE 4 MG/ML
INJECTION, SOLUTION INTRA-ARTICULAR; INTRALESIONAL; INTRAMUSCULAR; INTRAVENOUS; SOFT TISSUE PRN
Status: DISCONTINUED | OUTPATIENT
Start: 2018-01-18 | End: 2018-01-18

## 2018-01-18 RX ORDER — MEPERIDINE HYDROCHLORIDE 25 MG/ML
12.5 INJECTION INTRAMUSCULAR; INTRAVENOUS; SUBCUTANEOUS
Status: DISCONTINUED | OUTPATIENT
Start: 2018-01-18 | End: 2018-01-18 | Stop reason: HOSPADM

## 2018-01-18 RX ORDER — CEPHALEXIN 500 MG/1
500 CAPSULE ORAL 3 TIMES DAILY
Qty: 15 CAPSULE | Refills: 0 | Status: SHIPPED | OUTPATIENT
Start: 2018-01-18 | End: 2018-01-23

## 2018-01-18 RX ORDER — FENTANYL CITRATE 50 UG/ML
25-50 INJECTION, SOLUTION INTRAMUSCULAR; INTRAVENOUS EVERY 5 MIN PRN
Status: DISCONTINUED | OUTPATIENT
Start: 2018-01-18 | End: 2018-01-18 | Stop reason: HOSPADM

## 2018-01-18 RX ORDER — ONDANSETRON 4 MG/1
4 TABLET, ORALLY DISINTEGRATING ORAL
Status: DISCONTINUED | OUTPATIENT
Start: 2018-01-18 | End: 2018-01-18

## 2018-01-18 RX ORDER — NALOXONE HYDROCHLORIDE 0.4 MG/ML
.1-.4 INJECTION, SOLUTION INTRAMUSCULAR; INTRAVENOUS; SUBCUTANEOUS
Status: DISCONTINUED | OUTPATIENT
Start: 2018-01-18 | End: 2018-01-19

## 2018-01-18 RX ORDER — HYDROMORPHONE HYDROCHLORIDE 1 MG/ML
0.2 INJECTION, SOLUTION INTRAMUSCULAR; INTRAVENOUS; SUBCUTANEOUS
Status: DISCONTINUED | OUTPATIENT
Start: 2018-01-18 | End: 2018-01-20 | Stop reason: HOSPADM

## 2018-01-18 RX ORDER — ONDANSETRON 4 MG/1
4 TABLET, ORALLY DISINTEGRATING ORAL EVERY 6 HOURS PRN
Qty: 8 TABLET | Refills: 0 | Status: SHIPPED | OUTPATIENT
Start: 2018-01-18 | End: 2018-01-25

## 2018-01-18 RX ORDER — FENTANYL CITRATE 50 UG/ML
INJECTION, SOLUTION INTRAMUSCULAR; INTRAVENOUS PRN
Status: DISCONTINUED | OUTPATIENT
Start: 2018-01-18 | End: 2018-01-18

## 2018-01-18 RX ORDER — VECURONIUM BROMIDE 1 MG/ML
INJECTION, POWDER, LYOPHILIZED, FOR SOLUTION INTRAVENOUS PRN
Status: DISCONTINUED | OUTPATIENT
Start: 2018-01-18 | End: 2018-01-18

## 2018-01-18 RX ORDER — GINSENG 100 MG
CAPSULE ORAL
Status: DISCONTINUED
Start: 2018-01-18 | End: 2018-01-18 | Stop reason: WASHOUT

## 2018-01-18 RX ORDER — ONDANSETRON 2 MG/ML
4 INJECTION INTRAMUSCULAR; INTRAVENOUS EVERY 30 MIN PRN
Status: DISCONTINUED | OUTPATIENT
Start: 2018-01-18 | End: 2018-01-18 | Stop reason: HOSPADM

## 2018-01-18 RX ORDER — EPINEPHRINE 1 MG/ML
INJECTION, SOLUTION INTRAMUSCULAR; SUBCUTANEOUS
Status: DISCONTINUED
Start: 2018-01-18 | End: 2018-01-18 | Stop reason: HOSPADM

## 2018-01-18 RX ORDER — MAGNESIUM HYDROXIDE 1200 MG/15ML
LIQUID ORAL PRN
Status: DISCONTINUED | OUTPATIENT
Start: 2018-01-18 | End: 2018-01-18 | Stop reason: HOSPADM

## 2018-01-18 RX ORDER — LIDOCAINE 40 MG/G
CREAM TOPICAL
Status: DISCONTINUED | OUTPATIENT
Start: 2018-01-18 | End: 2018-01-18 | Stop reason: HOSPADM

## 2018-01-18 RX ORDER — BUPIVACAINE HYDROCHLORIDE AND EPINEPHRINE 2.5; 5 MG/ML; UG/ML
INJECTION, SOLUTION INFILTRATION; PERINEURAL PRN
Status: DISCONTINUED | OUTPATIENT
Start: 2018-01-18 | End: 2018-01-18 | Stop reason: HOSPADM

## 2018-01-18 RX ORDER — INDOMETHACIN 50 MG/1
100 SUPPOSITORY RECTAL
Status: DISCONTINUED | OUTPATIENT
Start: 2018-01-18 | End: 2018-01-18 | Stop reason: HOSPADM

## 2018-01-18 RX ORDER — METOCLOPRAMIDE HYDROCHLORIDE 5 MG/ML
10 INJECTION INTRAMUSCULAR; INTRAVENOUS EVERY 6 HOURS PRN
Status: DISCONTINUED | OUTPATIENT
Start: 2018-01-18 | End: 2018-01-18 | Stop reason: HOSPADM

## 2018-01-18 RX ORDER — ACETAMINOPHEN 10 MG/ML
INJECTION, SOLUTION INTRAVENOUS
Status: DISCONTINUED
Start: 2018-01-18 | End: 2018-01-18 | Stop reason: HOSPADM

## 2018-01-18 RX ORDER — OXYCODONE HYDROCHLORIDE 5 MG/1
5 TABLET ORAL
Status: DISCONTINUED | OUTPATIENT
Start: 2018-01-18 | End: 2018-01-18

## 2018-01-18 RX ORDER — HYDROXYZINE HYDROCHLORIDE 50 MG/ML
50 INJECTION, SOLUTION INTRAMUSCULAR
Status: DISCONTINUED | OUTPATIENT
Start: 2018-01-18 | End: 2018-01-18 | Stop reason: HOSPADM

## 2018-01-18 RX ORDER — HYDROMORPHONE HYDROCHLORIDE 1 MG/ML
.3-.5 INJECTION, SOLUTION INTRAMUSCULAR; INTRAVENOUS; SUBCUTANEOUS EVERY 10 MIN PRN
Status: DISCONTINUED | OUTPATIENT
Start: 2018-01-18 | End: 2018-01-18 | Stop reason: HOSPADM

## 2018-01-18 RX ORDER — PROPOFOL 10 MG/ML
INJECTION, EMULSION INTRAVENOUS PRN
Status: DISCONTINUED | OUTPATIENT
Start: 2018-01-18 | End: 2018-01-18

## 2018-01-18 RX ORDER — LIDOCAINE HYDROCHLORIDE 20 MG/ML
INJECTION, SOLUTION INFILTRATION; PERINEURAL PRN
Status: DISCONTINUED | OUTPATIENT
Start: 2018-01-18 | End: 2018-01-18

## 2018-01-18 RX ORDER — PROCHLORPERAZINE MALEATE 10 MG
10 TABLET ORAL EVERY 6 HOURS PRN
Status: DISCONTINUED | OUTPATIENT
Start: 2018-01-18 | End: 2018-01-20 | Stop reason: HOSPADM

## 2018-01-18 RX ORDER — ONDANSETRON 2 MG/ML
INJECTION INTRAMUSCULAR; INTRAVENOUS PRN
Status: DISCONTINUED | OUTPATIENT
Start: 2018-01-18 | End: 2018-01-18

## 2018-01-18 RX ORDER — KETOROLAC TROMETHAMINE 15 MG/ML
15 INJECTION, SOLUTION INTRAMUSCULAR; INTRAVENOUS EVERY 6 HOURS PRN
Status: DISCONTINUED | OUTPATIENT
Start: 2018-01-19 | End: 2018-01-20 | Stop reason: HOSPADM

## 2018-01-18 RX ORDER — SODIUM CHLORIDE, SODIUM LACTATE, POTASSIUM CHLORIDE, CALCIUM CHLORIDE 600; 310; 30; 20 MG/100ML; MG/100ML; MG/100ML; MG/100ML
INJECTION, SOLUTION INTRAVENOUS CONTINUOUS
Status: DISCONTINUED | OUTPATIENT
Start: 2018-01-18 | End: 2018-01-18 | Stop reason: HOSPADM

## 2018-01-18 RX ORDER — ACETAMINOPHEN 10 MG/ML
INJECTION, SOLUTION INTRAVENOUS PRN
Status: DISCONTINUED | OUTPATIENT
Start: 2018-01-18 | End: 2018-01-18

## 2018-01-18 RX ORDER — ONDANSETRON 4 MG/1
4 TABLET, ORALLY DISINTEGRATING ORAL EVERY 30 MIN PRN
Status: DISCONTINUED | OUTPATIENT
Start: 2018-01-18 | End: 2018-01-18 | Stop reason: HOSPADM

## 2018-01-18 RX ORDER — METOCLOPRAMIDE 10 MG/1
10 TABLET ORAL EVERY 6 HOURS PRN
Status: DISCONTINUED | OUTPATIENT
Start: 2018-01-18 | End: 2018-01-18 | Stop reason: HOSPADM

## 2018-01-18 RX ORDER — LIDOCAINE 40 MG/G
CREAM TOPICAL
Status: DISCONTINUED | OUTPATIENT
Start: 2018-01-18 | End: 2018-01-20 | Stop reason: HOSPADM

## 2018-01-18 RX ORDER — PROPOFOL 10 MG/ML
INJECTION, EMULSION INTRAVENOUS CONTINUOUS PRN
Status: DISCONTINUED | OUTPATIENT
Start: 2018-01-18 | End: 2018-01-18

## 2018-01-18 RX ORDER — NALOXONE HYDROCHLORIDE 0.4 MG/ML
.1-.4 INJECTION, SOLUTION INTRAMUSCULAR; INTRAVENOUS; SUBCUTANEOUS
Status: DISCONTINUED | OUTPATIENT
Start: 2018-01-18 | End: 2018-01-18 | Stop reason: HOSPADM

## 2018-01-18 RX ORDER — KETOROLAC TROMETHAMINE 30 MG/ML
30 INJECTION, SOLUTION INTRAMUSCULAR; INTRAVENOUS ONCE
Status: DISCONTINUED | OUTPATIENT
Start: 2018-01-18 | End: 2018-01-18 | Stop reason: HOSPADM

## 2018-01-18 RX ORDER — ONDANSETRON 2 MG/ML
4 INJECTION INTRAMUSCULAR; INTRAVENOUS EVERY 6 HOURS PRN
Status: DISCONTINUED | OUTPATIENT
Start: 2018-01-18 | End: 2018-01-20 | Stop reason: HOSPADM

## 2018-01-18 RX ORDER — LEVOTHYROXINE SODIUM 75 UG/1
150 TABLET ORAL DAILY
Status: DISCONTINUED | OUTPATIENT
Start: 2018-01-19 | End: 2018-01-20 | Stop reason: HOSPADM

## 2018-01-18 RX ORDER — KETAMINE HYDROCHLORIDE 10 MG/ML
INJECTION, SOLUTION INTRAMUSCULAR; INTRAVENOUS PRN
Status: DISCONTINUED | OUTPATIENT
Start: 2018-01-18 | End: 2018-01-18

## 2018-01-18 RX ADMIN — FENTANYL CITRATE 50 MCG: 50 INJECTION, SOLUTION INTRAMUSCULAR; INTRAVENOUS at 14:02

## 2018-01-18 RX ADMIN — VECURONIUM BROMIDE 2 MG: 1 INJECTION, POWDER, LYOPHILIZED, FOR SOLUTION INTRAVENOUS at 13:55

## 2018-01-18 RX ADMIN — ROCURONIUM BROMIDE 30 MG: 10 INJECTION INTRAVENOUS at 12:20

## 2018-01-18 RX ADMIN — FENTANYL CITRATE 50 MCG: 50 INJECTION, SOLUTION INTRAMUSCULAR; INTRAVENOUS at 14:03

## 2018-01-18 RX ADMIN — DEXMEDETOMIDINE HYDROCHLORIDE 8 MCG: 100 INJECTION, SOLUTION INTRAVENOUS at 14:15

## 2018-01-18 RX ADMIN — LIDOCAINE HYDROCHLORIDE 80 MG: 20 INJECTION, SOLUTION INFILTRATION; PERINEURAL at 12:20

## 2018-01-18 RX ADMIN — DEXAMETHASONE SODIUM PHOSPHATE 4 MG: 4 INJECTION, SOLUTION INTRA-ARTICULAR; INTRALESIONAL; INTRAMUSCULAR; INTRAVENOUS; SOFT TISSUE at 12:34

## 2018-01-18 RX ADMIN — KETOROLAC TROMETHAMINE 30 MG: 30 INJECTION, SOLUTION INTRAMUSCULAR; INTRAVENOUS at 22:28

## 2018-01-18 RX ADMIN — PHENYLEPHRINE HYDROCHLORIDE 50 MCG: 10 INJECTION INTRAVENOUS at 12:33

## 2018-01-18 RX ADMIN — SODIUM CHLORIDE, POTASSIUM CHLORIDE, SODIUM LACTATE AND CALCIUM CHLORIDE: 600; 310; 30; 20 INJECTION, SOLUTION INTRAVENOUS at 12:17

## 2018-01-18 RX ADMIN — FENTANYL CITRATE 50 MCG: 50 INJECTION, SOLUTION INTRAMUSCULAR; INTRAVENOUS at 12:52

## 2018-01-18 RX ADMIN — PROPOFOL 150 MCG/KG/MIN: 10 INJECTION, EMULSION INTRAVENOUS at 12:23

## 2018-01-18 RX ADMIN — MIDAZOLAM 2 MG: 1 INJECTION INTRAMUSCULAR; INTRAVENOUS at 12:20

## 2018-01-18 RX ADMIN — FENTANYL CITRATE 50 MCG: 50 INJECTION, SOLUTION INTRAMUSCULAR; INTRAVENOUS at 13:51

## 2018-01-18 RX ADMIN — ONDANSETRON 4 MG: 2 INJECTION INTRAMUSCULAR; INTRAVENOUS at 14:42

## 2018-01-18 RX ADMIN — SODIUM CHLORIDE: 9 INJECTION, SOLUTION INTRAVENOUS at 18:28

## 2018-01-18 RX ADMIN — CEFAZOLIN 1 G: 1 INJECTION, POWDER, FOR SOLUTION INTRAMUSCULAR; INTRAVENOUS at 14:28

## 2018-01-18 RX ADMIN — SODIUM CHLORIDE, POTASSIUM CHLORIDE, SODIUM LACTATE AND CALCIUM CHLORIDE: 600; 310; 30; 20 INJECTION, SOLUTION INTRAVENOUS at 13:00

## 2018-01-18 RX ADMIN — CEFAZOLIN SODIUM 2 G: 2 SOLUTION INTRAVENOUS at 12:30

## 2018-01-18 RX ADMIN — PHENYLEPHRINE HYDROCHLORIDE: 10 INJECTION INTRAVENOUS at 12:35

## 2018-01-18 RX ADMIN — DEXMEDETOMIDINE HYDROCHLORIDE 12 MCG: 100 INJECTION, SOLUTION INTRAVENOUS at 14:22

## 2018-01-18 RX ADMIN — ROCURONIUM BROMIDE 20 MG: 10 INJECTION INTRAVENOUS at 13:51

## 2018-01-18 RX ADMIN — FENTANYL CITRATE 50 MCG: 50 INJECTION, SOLUTION INTRAMUSCULAR; INTRAVENOUS at 12:20

## 2018-01-18 RX ADMIN — KETAMINE HYDROCHLORIDE 10 MG: 10 INJECTION, SOLUTION INTRAMUSCULAR; INTRAVENOUS at 14:10

## 2018-01-18 RX ADMIN — ACETAMINOPHEN 650 MG: 325 TABLET, FILM COATED ORAL at 21:09

## 2018-01-18 RX ADMIN — NEOSTIGMINE METHYLSULFATE 3.5 MG: 1 INJECTION INTRAMUSCULAR; INTRAVENOUS; SUBCUTANEOUS at 14:42

## 2018-01-18 RX ADMIN — KETAMINE HYDROCHLORIDE 10 MG: 10 INJECTION, SOLUTION INTRAMUSCULAR; INTRAVENOUS at 12:36

## 2018-01-18 RX ADMIN — SODIUM CHLORIDE, POTASSIUM CHLORIDE, SODIUM LACTATE AND CALCIUM CHLORIDE: 600; 310; 30; 20 INJECTION, SOLUTION INTRAVENOUS at 14:15

## 2018-01-18 RX ADMIN — GLYCOPYRROLATE 0.4 MG: 0.2 INJECTION, SOLUTION INTRAMUSCULAR; INTRAVENOUS at 14:42

## 2018-01-18 RX ADMIN — HYDROMORPHONE HYDROCHLORIDE 0.2 MG: 1 INJECTION, SOLUTION INTRAMUSCULAR; INTRAVENOUS; SUBCUTANEOUS at 21:37

## 2018-01-18 RX ADMIN — PROPOFOL 200 MG: 10 INJECTION, EMULSION INTRAVENOUS at 12:20

## 2018-01-18 RX ADMIN — ONDANSETRON 4 MG: 2 INJECTION INTRAMUSCULAR; INTRAVENOUS at 21:09

## 2018-01-18 RX ADMIN — ACETAMINOPHEN 1000 MG: 10 INJECTION, SOLUTION INTRAVENOUS at 12:45

## 2018-01-18 NOTE — ANESTHESIA PREPROCEDURE EVALUATION
Anesthesia Evaluation     . Pt has had prior anesthetic.     History of anesthetic complications   - PONV        ROS/MED HX    ENT/Pulmonary:       Neurologic:       Cardiovascular:         METS/Exercise Tolerance:     Hematologic:         Musculoskeletal:         GI/Hepatic:         Renal/Genitourinary:         Endo:     (+) thyroid problem (on replacement) hypothyroidism, .      Psychiatric:         Infectious Disease:         Malignancy:   (+) Malignancy History of Breast  Breast CA Remission status post.         Other:                     Physical Exam  Normal systems: cardiovascular and pulmonary    Airway   Mallampati: I  TM distance: >3 FB  Neck ROM: full    Dental     Cardiovascular       Pulmonary                     Anesthesia Plan      History & Physical Review  History and physical reviewed and following examination; no interval change.    ASA Status:  2 .    NPO Status:  > 8 hours    Plan for General and ETT with Intravenous and Propofol induction. Maintenance will be TIVA.    PONV prophylaxis:  Ondansetron (or other 5HT-3) and Dexamethasone or Solumedrol  Tylenol, Tordal, Ketamine (0.15mg/kg pre-incision and q1H until emergence)  No Sevo      Postoperative Care  Postoperative pain management:  IV analgesics and Oral pain medications.      Consents  Anesthetic plan, risks, benefits and alternatives discussed with:  Patient..      DPreop diagnosis: CHOLECYSTITIS  Procedure(s):  COMBINED RECONSTRUCT BREAST BILATERAL, IMPLANT PROSTHESIS BILATERAL  LAPAROSCOPIC CHOLECYSTECTOMY WITH CHOLANGIOGRAMS  Allergies   Allergen Reactions     Codeine Nausea     Fesoterodine      Dizziness, dry mouth       No current facility-administered medications on file prior to encounter.   Current Outpatient Prescriptions on File Prior to Encounter:  levothyroxine (SYNTHROID/LEVOTHROID) 150 MCG tablet Take 1 tablet (150 mcg) by mouth daily   VIT CALCIUM CITRATE + D TABS 250-62.5 MG-IU OR    VITAMIN C 250 MG OR TABS 1 TABLET 3  TIMES DAILY (Patient taking differently: 1 TABLET  DAILY)       Potassium   Date Value Ref Range Status   10/06/2017 3.9 3.4 - 5.3 mmol/L Final                     .

## 2018-01-18 NOTE — ANESTHESIA CARE TRANSFER NOTE
Patient: Lety Baldwin    Procedure(s):  BILATERAL BREAST RECONSTRUCTION WITH EXCHANGE OF GEL IMPLANTS AND CAPSULOTOMIES, LAPAROSCOPIC CHOLECYSTECTOMY WITH CHOLANGIOGRAMS - Wound Class: I-Clean   - Wound Class: II-Clean Contaminated    Diagnosis: CHOLECYSTITIS  Diagnosis Additional Information: No value filed.    Anesthesia Type:   General, ETT     Note:  Airway :Face Mask  Patient transferred to:PACU  Comments: Spont. Resps, pt. Responding.  Extubated, sufficient air exchange. To PACU VSS, Monitors on. Report to RNHandoff Report: Identifed the Patient, Identified the Reponsible Provider, Reviewed the pertinent medical history, Discussed the surgical course, Reviewed Intra-OP anesthesia mangement and issues during anesthesia, Set expectations for post-procedure period and Allowed opportunity for questions and acknowledgement of understanding      Vitals: (Last set prior to Anesthesia Care Transfer)    CRNA VITALS  1/18/2018 1434 - 1/18/2018 1510      1/18/2018             Pulse: 83    SpO2: 99 %    Resp Rate (observed): 11    Resp Rate (set): 10                Electronically Signed By: HAMILTON Hernandez CRNA  January 18, 2018  3:10 PM

## 2018-01-18 NOTE — BRIEF OP NOTE
[facility] Brief Operative Note    Pre-operative diagnosis: 1)  CHOLECYSTITIS  2)  H/O left breast CA s/p mastectomy and implant reconstruction  3)  Bilateral breast capsular contracture and asymmetry   Post-operative diagnosis Same   Procedure: Procedure(s):  BILATERAL BREAST RECONSTRUCTION WITH EXCHANGE OF GEL IMPLANTS AND CAPSULOTOMIES, LAPAROSCOPIC CHOLECYSTECTOMY WITH CHOLANGIOGRAMS - Wound Class: I-Clean   - Wound Class: II-Clean Contaminated   Surgeon(s): Surgeon(s) and Role:  Panel 1:      Derrell Diaz MD - Primary    Panel 2:      Peter Winslow MD - Primary   Estimated blood loss:   1:45 PM     Specimens:   ID Type Source Tests Collected by Time Destination   1 : left breast implant Implant Implant OR DOCUMENTATION ONLY Derrell Diaz MD 1/18/2018 12:43 PM    2 : right breast implant Implant Implant OR DOCUMENTATION ONLY Derrell Diaz MD 1/18/2018 12:44 PM       Findings:

## 2018-01-18 NOTE — IP AVS SNAPSHOT
[facility] Observation Unit    [address] [place] [id number]    Phone:  [phone]                                       After Visit Summary   1/18/2018    Lety Baldwin    MRN: [id number]           After Visit Summary Signature Page     I have received my discharge instructions, and my questions have been answered. I have discussed any challenges I see with this plan with the nurse or doctor.    ..........................................................................................................................................  Patient/Patient Representative Signature      ..........................................................................................................................................  Patient Representative Print Name and Relationship to Patient    ..................................................               ................................................  Date                                            Time    ..........................................................................................................................................  Reviewed by Signature/Title    ...................................................              ..............................................  Date                                                            Time

## 2018-01-18 NOTE — PROGRESS NOTES
Discussed choledocholithiasis with DR Mcclure, she will work on scheduling for tomorrow  Peter Winslow MD  1/18/2018 3:17 PM

## 2018-01-18 NOTE — DISCHARGE INSTRUCTIONS
HOME CARE FOLLOWING LAPAROSCOPIC CHOLECYSTECTOMY  CORY Fink E. Gavin, N. Guttormson, D. Maurer, BAKARI Lowe, ZAHIDA Barber    INCISIONAL CARE:  Replace the bandage over your incisions until all drainage stops, or if more comfortable to have in place.  If present, leave the steri-strips (white paper tapes) in place for 14 days after surgery.     BATHING:  Avoid baths for 1 week after surgery.  Showers are okay.  You may wash your hair at any time.  Gently pat your incisions dry after bathing.    ACTIVITY:  Light Activity -- you may immediately be up and about as tolerated.  Driving -- you may drive when comfortable and off narcotic pain medications.  Light Work -- resume when comfortable off pain medications.  (If you can drive, you probably can work.)  Strenuous Work/Activity -- limit lifting to 20 pounds for 1 week.  Progressively increase with time.  Active Sports (running, biking, etc.) -- cautiously resume after 2 weeks.    DISCOMFORT:  Use pain medications as prescribed by your surgeon.  Take the pain medication with some food, when possible, to minimize side effects.  Intermittent use of ice packs at the incision sites may help during the first 48 hours.  Expect gradual improvement.  You may experience shoulder pain, which is due to the air placed within your abdomen during the procedure.  This is temporary and usually passes within 2 days.    DIET:  Drink plenty of fluids.  While taking pain medications, increase dietary fiber or add a fiber supplementation like Metamucil or Citrucel to help prevent constipation - a possible side effect of pain medications.  It is not uncommon to experience some bowel changes (loose stools or constipation) after surgery.  Your body has to adapt to you no longer having a gall bladder.  To help minimize this side effect, avoid fatty foods for the first week after surgery.  You may then slowly increase the amount of fatty foods in your diet.      NAUSEA:  If  nauseated from the anesthetic/pain meds; rest in bed, get up cautiously with assistance, and drink clear liquids (juice, tea, broth).    RETURN APPOINTMENT:  Schedule a follow-up visit 2-3 weeks post-op.  Office Phone:  [phone]     CONTACT US IF THE FOLLOWING DEVELOPS:   1. A fever that is above 101     2. If there is a large amount of drainage, bleeding, or swelling.   3. Severe pain that is not relieved by your prescription.   4. Drainage that is thick, cloudy, yellow, green or white.   5. Any other questions not answered by  Frequently Asked Questions  sheet.      FREQUENTLY ASKED QUESTIONS:    Q:  How should my incision look?    A:  Normally your incision will appear slightly swollen with light redness directly along the incision itself as it heals.  It may feel like a bump or ridge as the healing/scarring happens, and over time (3-4 months) this bump or ridge feeling should slowly go away.  In general, clear or pink watery drainage can be normal at first as your incision heals, but should decrease over time.    Q:  How do I know if my incision is infected?  A:  Look at your incision for signs of infection, like redness around the incision spreading to surrounding skin, or drainage of cloudy or foul-smelling drainage.  If you feel warm, check your temperature to see if you are running a fever.    **If any of these things occur, please notify the nurse at our office.  We may need you to come into the office for an incision check.      Q:  How do I take care of my incision?  A:  If you have a dressing in place - Starting the day after surgery, replace the dressing 1-2 times a day until there is no further drainage from the incision.  At that time, a dressing is no longer needed.  Try to minimize tape on the skin if irritation is occurring at the tape sites.  If you have significant irritation from tape on the skin, please call the office to discuss other method of dressing your incision.    Small pieces of  tape called  steri-strips  may be present directly overlying your incision; these may be removed 10 days after surgery unless otherwise specified by your surgeon.  If these tapes start to loosen at the ends, you may trim them back until they fall off or are removed.      Q:  There is a piece of tape or a sticky  lead  still on my skin.  Can I remove this?  A:  Sometimes the sticky  leads  used for monitoring during surgery or for evaluation in the emergency department are not all removed while you are in the hospital.  These sometimes have a tab or metal dot on them.  You can easily remove these on your own, like taking off a band-aid.  If there is a gel substance under the  lead , simply wipe/clean it off with a washcloth or paper towel.      Q:  What can I do to minimize constipation (very hard stools, or lack of stools)?  A:  Stay well hydrated.  Increase your dietary fiber intake or take a fiber supplement -with plenty of water.  Walk around frequently.  You may consider an over-the-counter stool-softener.  Your Pharmacist can assist you with choosing one that is stocked at your pharmacy.  Constipation is also one of the most common side effects of pain medication.  If you are using pain medication, be pro-active and try to PREVENT problems with constipation by taking the steps above BEFORE constipation becomes a problem.    Q:  What do I do if I need more pain medications?  A:  Call the office to receive refills.  Be aware that certain pain meds cannot be called into a pharmacy and actually require a paper prescription.  A change may be made in your pain med as you progress thru your recovery period or if you have side effects to certain meds.    --Pain meds are NOT refilled after 5pm on weekdays, and NOT AT ALL on the weekends, so please look ahead to prevent problems.      Q:  Why am I having a hard time sleeping now that I am at home?  A:  Many medications you receive while you are in the hospital can impact  your sleep for a number of days after your surgery/hospitalization.  Decreased level of activity and naps during the day may also make sleeping at night difficult.  Try to minimize day-time naps, and get up frequently during the day to walk around your home during your recovery time.  Sleep aides may be of some help, but are not recommended for long-term use.      Q:  I am having some back discomfort.  What should I do?  A:  This may be related to certain positioning that was required for your surgery, extended periods of time in bed, or other changes in your overall activity level.  You may try ice, heat, acetaminophen, or ibuprofen to treat this temporarily.  Note that many pain medications have acetaminophen in them and would state this on the prescription bottle.  Be sure not to exceed the maximum of 4000mg per day of acetaminophen.     **If the pain you are having does not resolve, is severe, or is a flare of back pain you have had on other occasions prior to surgery, please contact your primary physician for further recommendations or for an appointment to be examined at their office.    Q:  Why am I having headaches?  A:  Headaches can be caused by many things:  caffeine withdrawal, use of pain meds, dehydration, high blood pressure, lack of sleep, over-activity/exhaustion, flare-up of usual migraine headaches.  If you feel this is related to muscle tension (a band-like feeling around the head, or a pressure at the low-back of the head) you may try ice or heat to this area.  You may need to drink more fluids (try electrolyte drink like Gatorade), rest, or take your usual migraine medications.   **If your headaches do not resolve, worsen, are accompanied by other symptoms, or if your blood pressure is high, please call your primary physician for recommendation and/or examination.    Q:  I am unable to urinate.  What do I do?  A:  A small percentage of people can have difficulty urinating initially after  surgery.  This includes being able to urinate only a very small amount at a time and feeling discomfort or pressure in the very low abdomen.  This is called  urinary retention , and is actually an urgent situation.  Proceed to your nearest Emergency department for evaluation (not an Urgent Care Center).  Sometimes the bladder does not work correctly after certain medications you receive during surgery, or related to certain procedures.  You may need to have a catheter placed until your bladder recovers.  When planning to go to an Emergency department, it may help to call the ER to let them know you are coming in for this problem after a surgery.  This may help you get in quicker to be evaluated.  **If you have symptoms of a urinary tract infection, please contact your primary physician for the proper evaluation and treatment.          If you have other questions, please call the office Monday thru Friday between 8am and 5pm to discuss with the nurse or physician assistant.  [phone]    There is a surgeon ON CALL on weekday evenings and over the weekend in case of urgent need only, and may be contacted at the same number.    If you are having an emergency, call 911 or proceed to your nearest emergency department.

## 2018-01-18 NOTE — ANESTHESIA POSTPROCEDURE EVALUATION
Patient: Lety Baldwin    Procedure(s):  BILATERAL BREAST RECONSTRUCTION WITH EXCHANGE OF GEL IMPLANTS AND CAPSULOTOMIES, LAPAROSCOPIC CHOLECYSTECTOMY WITH CHOLANGIOGRAMS - Wound Class: I-Clean   - Wound Class: II-Clean Contaminated    Diagnosis:CHOLECYSTITIS  Diagnosis Additional Information: No value filed.    Anesthesia Type:  General, ETT    Note:  Anesthesia Post Evaluation    Patient location during evaluation: PACU  Patient participation: Able to fully participate in evaluation  Level of consciousness: awake  Pain management: adequate  Airway patency: patent  Cardiovascular status: acceptable  Respiratory status: acceptable  Hydration status: acceptable  PONV: none     Anesthetic complications: None          Last vitals:  Vitals:    01/18/18 1615 01/18/18 1630 01/18/18 1645   BP: 108/90 115/77    Resp: 15 10 12   Temp: 35.7  C (96.3  F) 36.1  C (97  F) 36.1  C (97  F)   SpO2: 100% 100% 100%         Electronically Signed By: Greg Mendez MD  January 18, 2018  4:46 PM

## 2018-01-18 NOTE — OP NOTE
General Surgery Operative Note    PREOPERATIVE DIAGNOSIS:  CHOLECYSTITIS    POSTOPERATIVE DIAGNOSIS:  Same with choledocholithiasis    PROCEDURE:  Laparoscopic Cholecystectomy with fluoroscopic cholangiogram    ANESTHESIA:  General.    PREOPERATIVE MEDICATIONS:  Ancef IV.    SURGEON:  Peter Winslow MD    ASSISTANT:  Jennifer Vallejo PA-C                         The physicians assistant was medically necessary for their expertise in camera management, suctioning, suturing, and retraction.      ESTIMATED BLOOD LOSS:  5cc's    INDICATIONS:  Lety Baldwin is a 62 year old female who has been experiencing episodes of RUQ and RLQ abdominal pain.  Abdominal imaging has revealed multiple small stones.  She now presents for laparoscopic cholecystectomy after having risks and benefits reviewed in detail.      PROCEDURE:  The patient was brought to the operating room, placed supine on the table and after induction of anesthetic, the abdomen was prepped and draped in sterile manner.  Pause was performed.  An incision was made above the umbilicus and extended down to the midline fascia which was then opened.  A Vero trocar was inserted.  Gas was insufflated.  The laparoscope was advanced.  There was no evidence or underlying bowel or tissue injury.  Secondary trocars were placed under laparoscopic guidance.  The gallbladder was grasped and retracted cephalad.  The infundibulum was grasped and retracted laterally.  The region of the cystic duct was stripped of its peritoneal and fatty coverings and followed to its confluence with the common hepatic and common bile ducts.  Once this confluence was identified, the region behind the infundibulum was also dissected, finding no aberrant ducts but identifying the cystic artery.  A fluoroscopic cholangiogram was obtained with the taught catheter and clip.  This showed normal anatomy and good drainage into the duodenum.  Several filling defects were noted in the distal duct  consistent with stones. The duct was irrigated and re-imaged and the filling defects persisted.  Radiology reviewed the films and concurred.  The cholangiogram catheter was removed.  The gallbladder was then clipped at the infundibulum and 2 clips were placed on the cystic duct a generous distance from its confluence with the common hepatic and common bile ducts.  The cystic duct was then cut from the gallbladder.  Similarly, the cystic artery was triply clipped and divided.  The gallbladder was then removed from its hepatic fossa with electrocautery with two other small arteries clipped.  Once the gallbladder was removed from its fossa, it was placed in an Endocatch pouch and removed from the abdomen through the supraumbilical incision without spillage of bile or stones.  Marcaine was placed at each of the trocar sites and they were sequentially removed and viewed from within to be sure no bleeding was present and none was seen.  The final trocar was then removed after venting as much gas as possible from the abdomen and the fascia was specifically closed with 0 Vicryl sutures.  Subcuticular skin closure was performed followed by placement of Steri-Strips and dressings and she was returned to the recovery room in excellent condition with all sponge and needle counts correct, having tolerated the procedure well.    INTRAOPERATIVE FINDINGS:  Normal biliary anatomy, several CBD stones (non-obstructing)    Specimens:   ID Type Source Tests Collected by Time Destination   1 : left breast implant Implant Implant OR DOCUMENTATION ONLY Derrell Diaz MD 1/18/2018 12:43 PM    2 : right breast implant Implant Implant OR DOCUMENTATION ONLY Derrell Diaz MD 1/18/2018 12:44 PM    A : Gallbladdder & Contents Tissue Gallbladder and Contents SURGICAL PATHOLOGY EXAM Peter Winslow MD 1/18/2018  2:15 PM        Peter Winslow MD

## 2018-01-18 NOTE — IP AVS SNAPSHOT
MRN:[id number]                      After Visit Summary   1/18/2018    Lety Baldwin    MRN: [id number]           Thank you!     Thank you for choosing [facility] for your care. Our goal is always to provide you with excellent care. Hearing back from our patients is one way we can continue to improve our services. Please take a few minutes to complete the written survey that you may receive in the mail after you visit with us. Thank you!        Patient Information     Date Of Birth          1955        About your hospital stay     You were admitted on:  January 18, 2018 You last received care in the:  [facility]    You were discharged on:  January 20, 2018       Who to Call     For medical emergencies, please call 911.  For non-urgent questions about your medical care, please call your primary care provider or clinic, [phone]  For questions related to your surgery, please call your surgery clinic        Attending Provider     Provider Derrell Ruiz MD Plastic Surgery    GoldyPeter MD General Surgery       Primary Care Provider Office Phone # Fax #    Ernestina Figueroa [phone]      After Care Instructions     Discharge Instructions       Resume pre procedure diet            Discharge Instructions       Lifting limit of 5  pounds until seen at Post-op follow up appointment            Discharge Instructions       Leave bra and gauze dressings on for 48 hours, adjust if too tight or too loose.  Remove after 48 hours and shower, leave steri strips intact, use sports bra or surgical bra to breasts. Keep elbows at sides, no lifting or reaching.            Discharge Instructions       Follow up with Dr. Diaz on Monday 1/22.  Call clinic with any problems.            Ice to affected area       Ice PRN, No heat to area for 24 hours for medical brachial blocks            No Alcohol       No Alcohol for 24 hours post procedure            No  Aspirin, Ibuprofen or Naproxen       No Aspirin, Ibuprofen or Naproxen products for 7 - 10 days following surgery            No driving or operating machinery       No driving or operating machinery until day after procedure                  Further instructions from your care team       HOME CARE FOLLOWING LAPAROSCOPIC CHOLECYSTECTOMY  CORY Fink E. Gavin, N. Guttormson, D. Maurer, ZAHIDA García    INCISIONAL CARE:  Replace the bandage over your incisions until all drainage stops, or if more comfortable to have in place.  If present, leave the steri-strips (white paper tapes) in place for 14 days after surgery.     BATHING:  Avoid baths for 1 week after surgery.  Showers are okay.  You may wash your hair at any time.  Gently pat your incisions dry after bathing.    ACTIVITY:  Light Activity -- you may immediately be up and about as tolerated.  Driving -- you may drive when comfortable and off narcotic pain medications.  Light Work -- resume when comfortable off pain medications.  (If you can drive, you probably can work.)  Strenuous Work/Activity -- limit lifting to 20 pounds for 1 week.  Progressively increase with time.  Active Sports (running, biking, etc.) -- cautiously resume after 2 weeks.    DISCOMFORT:  Use pain medications as prescribed by your surgeon.  Take the pain medication with some food, when possible, to minimize side effects.  Intermittent use of ice packs at the incision sites may help during the first 48 hours.  Expect gradual improvement.  You may experience shoulder pain, which is due to the air placed within your abdomen during the procedure.  This is temporary and usually passes within 2 days.    DIET:  Drink plenty of fluids.  While taking pain medications, increase dietary fiber or add a fiber supplementation like Metamucil or Citrucel to help prevent constipation - a possible side effect of pain medications.  It is not uncommon to experience some bowel changes  (loose stools or constipation) after surgery.  Your body has to adapt to you no longer having a gall bladder.  To help minimize this side effect, avoid fatty foods for the first week after surgery.  You may then slowly increase the amount of fatty foods in your diet.      NAUSEA:  If nauseated from the anesthetic/pain meds; rest in bed, get up cautiously with assistance, and drink clear liquids (juice, tea, broth).    RETURN APPOINTMENT:  Schedule a follow-up visit 2-3 weeks post-op.  Office Phone:  [phone]     CONTACT US IF THE FOLLOWING DEVELOPS:   1. A fever that is above 101     2. If there is a large amount of drainage, bleeding, or swelling.   3. Severe pain that is not relieved by your prescription.   4. Drainage that is thick, cloudy, yellow, green or white.   5. Any other questions not answered by  Frequently Asked Questions  sheet.      FREQUENTLY ASKED QUESTIONS:    Q:  How should my incision look?    A:  Normally your incision will appear slightly swollen with light redness directly along the incision itself as it heals.  It may feel like a bump or ridge as the healing/scarring happens, and over time (3-4 months) this bump or ridge feeling should slowly go away.  In general, clear or pink watery drainage can be normal at first as your incision heals, but should decrease over time.    Q:  How do I know if my incision is infected?  A:  Look at your incision for signs of infection, like redness around the incision spreading to surrounding skin, or drainage of cloudy or foul-smelling drainage.  If you feel warm, check your temperature to see if you are running a fever.    **If any of these things occur, please notify the nurse at our office.  We may need you to come into the office for an incision check.      Q:  How do I take care of my incision?  A:  If you have a dressing in place - Starting the day after surgery, replace the dressing 1-2 times a day until there is no further drainage from the  incision.  At that time, a dressing is no longer needed.  Try to minimize tape on the skin if irritation is occurring at the tape sites.  If you have significant irritation from tape on the skin, please call the office to discuss other method of dressing your incision.    Small pieces of tape called  steri-strips  may be present directly overlying your incision; these may be removed 10 days after surgery unless otherwise specified by your surgeon.  If these tapes start to loosen at the ends, you may trim them back until they fall off or are removed.      Q:  There is a piece of tape or a sticky  lead  still on my skin.  Can I remove this?  A:  Sometimes the sticky  leads  used for monitoring during surgery or for evaluation in the emergency department are not all removed while you are in the hospital.  These sometimes have a tab or metal dot on them.  You can easily remove these on your own, like taking off a band-aid.  If there is a gel substance under the  lead , simply wipe/clean it off with a washcloth or paper towel.      Q:  What can I do to minimize constipation (very hard stools, or lack of stools)?  A:  Stay well hydrated.  Increase your dietary fiber intake or take a fiber supplement -with plenty of water.  Walk around frequently.  You may consider an over-the-counter stool-softener.  Your Pharmacist can assist you with choosing one that is stocked at your pharmacy.  Constipation is also one of the most common side effects of pain medication.  If you are using pain medication, be pro-active and try to PREVENT problems with constipation by taking the steps above BEFORE constipation becomes a problem.    Q:  What do I do if I need more pain medications?  A:  Call the office to receive refills.  Be aware that certain pain meds cannot be called into a pharmacy and actually require a paper prescription.  A change may be made in your pain med as you progress thru your recovery period or if you have side effects  to certain meds.    --Pain meds are NOT refilled after 5pm on weekdays, and NOT AT ALL on the weekends, so please look ahead to prevent problems.      Q:  Why am I having a hard time sleeping now that I am at home?  A:  Many medications you receive while you are in the hospital can impact your sleep for a number of days after your surgery/hospitalization.  Decreased level of activity and naps during the day may also make sleeping at night difficult.  Try to minimize day-time naps, and get up frequently during the day to walk around your home during your recovery time.  Sleep aides may be of some help, but are not recommended for long-term use.      Q:  I am having some back discomfort.  What should I do?  A:  This may be related to certain positioning that was required for your surgery, extended periods of time in bed, or other changes in your overall activity level.  You may try ice, heat, acetaminophen, or ibuprofen to treat this temporarily.  Note that many pain medications have acetaminophen in them and would state this on the prescription bottle.  Be sure not to exceed the maximum of 4000mg per day of acetaminophen.     **If the pain you are having does not resolve, is severe, or is a flare of back pain you have had on other occasions prior to surgery, please contact your primary physician for further recommendations or for an appointment to be examined at their office.    Q:  Why am I having headaches?  A:  Headaches can be caused by many things:  caffeine withdrawal, use of pain meds, dehydration, high blood pressure, lack of sleep, over-activity/exhaustion, flare-up of usual migraine headaches.  If you feel this is related to muscle tension (a band-like feeling around the head, or a pressure at the low-back of the head) you may try ice or heat to this area.  You may need to drink more fluids (try electrolyte drink like Gatorade), rest, or take your usual migraine medications.   **If your headaches do not  resolve, worsen, are accompanied by other symptoms, or if your blood pressure is high, please call your primary physician for recommendation and/or examination.    Q:  I am unable to urinate.  What do I do?  A:  A small percentage of people can have difficulty urinating initially after surgery.  This includes being able to urinate only a very small amount at a time and feeling discomfort or pressure in the very low abdomen.  This is called  urinary retention , and is actually an urgent situation.  Proceed to your nearest Emergency department for evaluation (not an Urgent Care Center).  Sometimes the bladder does not work correctly after certain medications you receive during surgery, or related to certain procedures.  You may need to have a catheter placed until your bladder recovers.  When planning to go to an Emergency department, it may help to call the ER to let them know you are coming in for this problem after a surgery.  This may help you get in quicker to be evaluated.  **If you have symptoms of a urinary tract infection, please contact your primary physician for the proper evaluation and treatment.          If you have other questions, please call the office Monday thru Friday between 8am and 5pm to discuss with the nurse or physician assistant.  #[phone]    There is a surgeon ON CALL on weekday evenings and over the weekend in case of urgent need only, and may be contacted at the same number.    If you are having an emergency, call 911 or proceed to your nearest emergency department.        Pending Results     Date and Time Order Name Status Description    1/18/2018 1601 XR ERCP Preliminary             Statement of Approval     Ordered          01/19/18 1544  I have reviewed and agree with all the recommendations and orders detailed in this document.  EFFECTIVE NOW     Comments:  Discharge once Dr. Erickson sees and agrees she is OK for discharge   Associated Diagnoses:  Choledocholithiasis [K80.50]   "  Approved and electronically signed by:  Elizabeth Spaulding PA-C             Admission Information     Date & Time Provider Department Dept. Phone    1/18/2018 Peter Winslow MD [facility] [phone]      Your Vitals Were     Blood Pressure Temperature Respirations Height Weight Last Period    103/60 (BP Location: Left arm) 97.1  F (36.2  C) (Oral) 16 1.702 m (5' 7\") 68.8 kg (151 lb 11.2 oz) 01/01/2004    Pulse Oximetry BMI (Body Mass Index)                95% 23.76 kg/m2          MyChart Information     [employer] gives you secure access to your electronic health record. If you see a primary care provider, you can also send messages to your care team and make appointments. If you have questions, please call your primary care clinic.  If you do not have a primary care provider, please call [phone] and they will assist you.        Care EveryWhere ID     This is your Care EveryWhere ID. This could be used by other organizations to access your [facility] medical records  [id number]        Equal Access to Services     [facility] Center[facility]IS : Hadii lorena Montague, wabárbara palacio, ernie cummins, jessica bailey. So [facility] [phone].    ATENCIÓN: Si habla español, tiene a ibrahim disposición servicios gratuitos de asistencia lingüística. Brendon al [phone].    We comply with applicable federal civil rights laws and [place] laws. We do not discriminate on the basis of race, color, national origin, age, disability, sex, sexual orientation, or gender identity.               Review of your medicines      START taking        Dose / Directions    cephALEXin 500 MG capsule   Commonly known as:  KEFLEX   Used for:  S/P breast reconstruction, right   Notes to Patient:  Begin taking today when you get home        Dose:  500 mg   Take 1 capsule (500 mg) by mouth 3 times daily for 5 days   Quantity:  15 capsule   Refills:  0       ondansetron 4 MG ODT tab   Commonly " known as:  ZOFRAN-ODT   Used for:  S/P breast reconstruction, right        Dose:  4 mg   Take 1 tablet (4 mg) by mouth every 6 hours as needed for nausea   Quantity:  8 tablet   Refills:  0       ondansetron 4 MG tablet   Commonly known as:  ZOFRAN   Used for:  Cholecystitis        Dose:  4 mg   Take 1 tablet (4 mg) by mouth every 6 hours as needed for nausea   Quantity:  10 tablet   Refills:  0       oxyCODONE IR 5 MG tablet   Commonly known as:  ROXICODONE   Used for:  Calculus of gallbladder with chronic cholecystitis without obstruction        Dose:  5-10 mg   Take 1-2 tablets (5-10 mg) by mouth every 3 hours as needed for pain or other (Moderate to Severe)   Quantity:  30 tablet   Refills:  0       oxyCODONE-acetaminophen 5-325 MG per tablet   Commonly known as:  PERCOCET   Used for:  S/P breast reconstruction, right        Dose:  1-2 tablet   Take 1-2 tablets by mouth every 4 hours as needed for pain   Quantity:  30 tablet   Refills:  0         CONTINUE these medicines which may have CHANGED, or have new prescriptions. If we are uncertain of the size of tablets/capsules you have at home, strength may be listed as something that might have changed.        Dose / Directions    ascorbic acid 250 MG tablet   Commonly known as:  VITAMIN C   This may have changed:  See the new instructions.   Notes to Patient:  Resume taking as you do at home        1 TABLET 3 TIMES DAILY   Refills:  0       VIT CALCIUM CITRATE + D TABS 250-62.5 MG-IU OR   This may have changed:  See the new instructions.   Notes to Patient:  Resume taking as you do at home          Refills:  0         CONTINUE these medicines which have NOT CHANGED        Dose / Directions    levothyroxine 150 MCG tablet   Commonly known as:  SYNTHROID/LEVOTHROID   Used for:  Acquired hypothyroidism        Dose:  150 mcg   Take 1 tablet (150 mcg) by mouth daily   Quantity:  90 tablet   Refills:  3         STOP taking     NAPROXEN SODIUM PO                Where to  get your medicines      These medications were sent to [facility] [place], [place] - [address]  [address] [place] [address]     Phone:  [phone]     cephALEXin 500 MG capsule    ondansetron 4 MG ODT tab         Some of these will need a paper prescription and others can be bought over the counter. Ask your nurse if you have questions.     Bring a paper prescription for each of these medications     ondansetron 4 MG tablet    oxyCODONE IR 5 MG tablet    oxyCODONE-acetaminophen 5-325 MG per tablet               ANTIBIOTIC INSTRUCTION     You've Been Prescribed an Antibiotic - Now What?  Your healthcare team thinks that you or your loved one might have an infection. Some infections can be treated with antibiotics, which are powerful, life-saving drugs. Like all medications, antibiotics have side effects and should only be used when necessary. There are some important things you should know about your antibiotic treatment.      Your healthcare team may run tests before you start taking an antibiotic.    Your team may take samples (e.g., from your blood, urine or other areas) to run tests to look for bacteria. These test can be important to determine if you need an antibiotic at all and, if you do, which antibiotic will work best.      Within a few days, your healthcare team might change or even stop your antibiotic.    Your team may start you on an antibiotic while they are working to find out what is making you sick.    Your team might change your antibiotic because test results show that a different antibiotic would be better to treat your infection.    In some cases, once your team has more information, they learn that you do not need an antibiotic at all. They may find out that you don't have an infection, or that the antibiotic you're taking won't work against your infection. For example, an infection caused by a virus can't be treated with antibiotics. Staying on an  antibiotic when you don't need it is more likely to be harmful than helpful.      You may experience side effects from your antibiotic.    Like all medications, antibiotics have side effects. Some of these can be serious.    Let you healthcare team know if you have any known allergies when you are admitted to the hospital.    One significant side effect of nearly all antibiotics is the risk of severe and sometimes deadly diarrhea caused by Clostridium difficile (C. Difficile). This occurs when a person takes antibiotics because some good germs are destroyed. Antibiotic use allows C. diificile to take over, putting patients at high risk for this serious infection.    As a patient or caregiver, it is important to understand your or your loved one's antibiotic treatment. It is especially important for caregivers to speak up when patients can't speak for themselves. Here are some important questions to ask your healthcare team.    What infection is this antibiotic treating and how do you know I have that infection?    What side effects might occur from this antibiotic?    How long will I need to take this antibiotic?    Is it safe to take this antibiotic with other medications or supplements (e.g., vitamins) that I am taking?     Are there any special directions I need to know about taking this antibiotic? For example, should I take it with food?    How will I be monitored to know whether my infection is responding to the antibiotic?    What tests may help to make sure the right antibiotic is prescribed for me?      Information provided by:  www.cdc.gov/getsmart  U.S. Department of Health and Human Services  Centers for disease Control and Prevention  National Center for Emerging and Zoonotic Infectious Diseases  Division of Healthcare Quality Promotion         Protect others around you: Learn how to safely use, store and throw away your medicines at www.disposemymeds.org.             Medication List: This is a list of  all your medications and when to take them. Check marks below indicate your daily home schedule. Keep this list as a reference.      Medications           Morning Afternoon Evening Bedtime As Needed    ascorbic acid 250 MG tablet   Commonly known as:  VITAMIN C   1 TABLET 3 TIMES DAILY   Notes to Patient:  Resume taking as you do at home                                   cephALEXin 500 MG capsule   Commonly known as:  KEFLEX   Take 1 capsule (500 mg) by mouth 3 times daily for 5 days   Notes to Patient:  Begin taking today when you get home                                         levothyroxine 150 MCG tablet   Commonly known as:  SYNTHROID/LEVOTHROID   Take 1 tablet (150 mcg) by mouth daily   Last time this was given:  150 mcg on 1/20/2018  8:16 AM            Tomorrow 1/21                       ondansetron 4 MG ODT tab   Commonly known as:  ZOFRAN-ODT   Take 1 tablet (4 mg) by mouth every 6 hours as needed for nausea                                   ondansetron 4 MG tablet   Commonly known as:  ZOFRAN   Take 1 tablet (4 mg) by mouth every 6 hours as needed for nausea                                   oxyCODONE IR 5 MG tablet   Commonly known as:  ROXICODONE   Take 1-2 tablets (5-10 mg) by mouth every 3 hours as needed for pain or other (Moderate to Severe)                                   oxyCODONE-acetaminophen 5-325 MG per tablet   Commonly known as:  PERCOCET   Take 1-2 tablets by mouth every 4 hours as needed for pain                                   VIT CALCIUM CITRATE + D TABS 250-62.5 MG-IU OR   Notes to Patient:  Resume taking as you do at home

## 2018-01-19 ENCOUNTER — APPOINTMENT (OUTPATIENT)
Dept: GENERAL RADIOLOGY | Facility: CLINIC | Age: 63
End: 2018-01-19
Attending: INTERNAL MEDICINE
Payer: COMMERCIAL

## 2018-01-19 ENCOUNTER — ANESTHESIA (OUTPATIENT)
Dept: SURGERY | Facility: CLINIC | Age: 63
End: 2018-01-19
Payer: COMMERCIAL

## 2018-01-19 ENCOUNTER — ANESTHESIA EVENT (OUTPATIENT)
Dept: SURGERY | Facility: CLINIC | Age: 63
End: 2018-01-19
Payer: COMMERCIAL

## 2018-01-19 LAB
ALBUMIN SERPL-MCNC: 3.5 G/DL (ref 3.4–5)
ALP SERPL-CCNC: 66 U/L (ref 40–150)
ALT SERPL W P-5'-P-CCNC: 43 U/L (ref 0–50)
AST SERPL W P-5'-P-CCNC: 31 U/L (ref 0–45)
BILIRUB DIRECT SERPL-MCNC: 0.1 MG/DL (ref 0–0.2)
BILIRUB SERPL-MCNC: 0.8 MG/DL (ref 0.2–1.3)
COPATH REPORT: NORMAL
CREAT SERPL-MCNC: 0.6 MG/DL (ref 0.52–1.04)
ERCP: NORMAL
GFR SERPL CREATININE-BSD FRML MDRD: >90 ML/MIN/1.7M2
GLUCOSE BLDC GLUCOMTR-MCNC: 111 MG/DL (ref 70–99)
HGB BLD-MCNC: 13.6 G/DL (ref 11.7–15.7)
INR PPP: 1.11 (ref 0.86–1.14)
PROT SERPL-MCNC: 6.8 G/DL (ref 6.8–8.8)

## 2018-01-19 PROCEDURE — C1887 CATHETER, GUIDING: HCPCS | Performed by: INTERNAL MEDICINE

## 2018-01-19 PROCEDURE — 27210286 ZZH BALLOON ADDITIONAL: Performed by: INTERNAL MEDICINE

## 2018-01-19 PROCEDURE — 25000128 H RX IP 250 OP 636: Performed by: SURGERY

## 2018-01-19 PROCEDURE — 25000125 ZZHC RX 250: Performed by: INTERNAL MEDICINE

## 2018-01-19 PROCEDURE — 25000125 ZZHC RX 250: Performed by: NURSE ANESTHETIST, CERTIFIED REGISTERED

## 2018-01-19 PROCEDURE — 82962 GLUCOSE BLOOD TEST: CPT

## 2018-01-19 PROCEDURE — 74330 X-RAY BILE/PANC ENDOSCOPY: CPT

## 2018-01-19 PROCEDURE — 80076 HEPATIC FUNCTION PANEL: CPT | Performed by: INTERNAL MEDICINE

## 2018-01-19 PROCEDURE — 37000009 ZZH ANESTHESIA TECHNICAL FEE, EACH ADDTL 15 MIN: Performed by: INTERNAL MEDICINE

## 2018-01-19 PROCEDURE — 37000008 ZZH ANESTHESIA TECHNICAL FEE, 1ST 30 MIN: Performed by: INTERNAL MEDICINE

## 2018-01-19 PROCEDURE — 25000566 ZZH SEVOFLURANE, EA 15 MIN: Performed by: INTERNAL MEDICINE

## 2018-01-19 PROCEDURE — 25000128 H RX IP 250 OP 636: Performed by: NURSE ANESTHETIST, CERTIFIED REGISTERED

## 2018-01-19 PROCEDURE — 40000934 ZZH STATISTIC OUTPATIENT (NON-OBS) DAY

## 2018-01-19 PROCEDURE — 71000012 ZZH RECOVERY PHASE 1 LEVEL 1 FIRST HR: Performed by: INTERNAL MEDICINE

## 2018-01-19 PROCEDURE — 36415 COLL VENOUS BLD VENIPUNCTURE: CPT | Performed by: INTERNAL MEDICINE

## 2018-01-19 PROCEDURE — 25000132 ZZH RX MED GY IP 250 OP 250 PS 637: Performed by: SURGERY

## 2018-01-19 PROCEDURE — 25000128 H RX IP 250 OP 636: Performed by: ANESTHESIOLOGY

## 2018-01-19 PROCEDURE — 36000058 ZZH SURGERY LEVEL 3 EA 15 ADDTL MIN: Performed by: INTERNAL MEDICINE

## 2018-01-19 PROCEDURE — 40000170 ZZH STATISTIC PRE-PROCEDURE ASSESSMENT II: Performed by: INTERNAL MEDICINE

## 2018-01-19 PROCEDURE — C1769 GUIDE WIRE: HCPCS | Performed by: INTERNAL MEDICINE

## 2018-01-19 PROCEDURE — 85610 PROTHROMBIN TIME: CPT | Performed by: INTERNAL MEDICINE

## 2018-01-19 PROCEDURE — 85018 HEMOGLOBIN: CPT | Performed by: ANESTHESIOLOGY

## 2018-01-19 PROCEDURE — 36000056 ZZH SURGERY LEVEL 3 1ST 30 MIN: Performed by: INTERNAL MEDICINE

## 2018-01-19 PROCEDURE — 36415 COLL VENOUS BLD VENIPUNCTURE: CPT | Performed by: ANESTHESIOLOGY

## 2018-01-19 PROCEDURE — 25000125 ZZHC RX 250: Performed by: ANESTHESIOLOGY

## 2018-01-19 RX ORDER — LIDOCAINE HYDROCHLORIDE 20 MG/ML
INJECTION, SOLUTION INFILTRATION; PERINEURAL PRN
Status: DISCONTINUED | OUTPATIENT
Start: 2018-01-19 | End: 2018-01-19

## 2018-01-19 RX ORDER — NALOXONE HYDROCHLORIDE 0.4 MG/ML
.1-.4 INJECTION, SOLUTION INTRAMUSCULAR; INTRAVENOUS; SUBCUTANEOUS
Status: DISCONTINUED | OUTPATIENT
Start: 2018-01-19 | End: 2018-01-19

## 2018-01-19 RX ORDER — SODIUM CHLORIDE, SODIUM LACTATE, POTASSIUM CHLORIDE, CALCIUM CHLORIDE 600; 310; 30; 20 MG/100ML; MG/100ML; MG/100ML; MG/100ML
INJECTION, SOLUTION INTRAVENOUS CONTINUOUS
Status: DISCONTINUED | OUTPATIENT
Start: 2018-01-19 | End: 2018-01-19 | Stop reason: HOSPADM

## 2018-01-19 RX ORDER — NALOXONE HYDROCHLORIDE 0.4 MG/ML
.1-.4 INJECTION, SOLUTION INTRAMUSCULAR; INTRAVENOUS; SUBCUTANEOUS
Status: DISCONTINUED | OUTPATIENT
Start: 2018-01-19 | End: 2018-01-20 | Stop reason: HOSPADM

## 2018-01-19 RX ORDER — GLYCOPYRROLATE 0.2 MG/ML
INJECTION, SOLUTION INTRAMUSCULAR; INTRAVENOUS PRN
Status: DISCONTINUED | OUTPATIENT
Start: 2018-01-19 | End: 2018-01-19

## 2018-01-19 RX ORDER — FLUMAZENIL 0.1 MG/ML
0.2 INJECTION, SOLUTION INTRAVENOUS
Status: ACTIVE | OUTPATIENT
Start: 2018-01-19 | End: 2018-01-20

## 2018-01-19 RX ORDER — PROPOFOL 10 MG/ML
INJECTION, EMULSION INTRAVENOUS CONTINUOUS PRN
Status: DISCONTINUED | OUTPATIENT
Start: 2018-01-19 | End: 2018-01-19

## 2018-01-19 RX ORDER — PROPOFOL 10 MG/ML
INJECTION, EMULSION INTRAVENOUS PRN
Status: DISCONTINUED | OUTPATIENT
Start: 2018-01-19 | End: 2018-01-19

## 2018-01-19 RX ORDER — LIDOCAINE 40 MG/G
CREAM TOPICAL
Status: DISCONTINUED | OUTPATIENT
Start: 2018-01-19 | End: 2018-01-19 | Stop reason: HOSPADM

## 2018-01-19 RX ORDER — FENTANYL CITRATE 50 UG/ML
25-50 INJECTION, SOLUTION INTRAMUSCULAR; INTRAVENOUS
Status: DISCONTINUED | OUTPATIENT
Start: 2018-01-19 | End: 2018-01-19 | Stop reason: HOSPADM

## 2018-01-19 RX ORDER — INDOMETHACIN 50 MG/1
SUPPOSITORY RECTAL PRN
Status: DISCONTINUED | OUTPATIENT
Start: 2018-01-19 | End: 2018-01-19 | Stop reason: HOSPADM

## 2018-01-19 RX ORDER — INDOMETHACIN 50 MG/1
100 SUPPOSITORY RECTAL
Status: DISCONTINUED | OUTPATIENT
Start: 2018-01-19 | End: 2018-01-19 | Stop reason: HOSPADM

## 2018-01-19 RX ORDER — ONDANSETRON 2 MG/ML
4 INJECTION INTRAMUSCULAR; INTRAVENOUS EVERY 30 MIN PRN
Status: DISCONTINUED | OUTPATIENT
Start: 2018-01-19 | End: 2018-01-19 | Stop reason: HOSPADM

## 2018-01-19 RX ORDER — DEXAMETHASONE SODIUM PHOSPHATE 4 MG/ML
INJECTION, SOLUTION INTRA-ARTICULAR; INTRALESIONAL; INTRAMUSCULAR; INTRAVENOUS; SOFT TISSUE PRN
Status: DISCONTINUED | OUTPATIENT
Start: 2018-01-19 | End: 2018-01-19

## 2018-01-19 RX ORDER — SODIUM CHLORIDE, SODIUM LACTATE, POTASSIUM CHLORIDE, CALCIUM CHLORIDE 600; 310; 30; 20 MG/100ML; MG/100ML; MG/100ML; MG/100ML
INJECTION, SOLUTION INTRAVENOUS CONTINUOUS PRN
Status: DISCONTINUED | OUTPATIENT
Start: 2018-01-19 | End: 2018-01-19

## 2018-01-19 RX ORDER — NEOSTIGMINE METHYLSULFATE 1 MG/ML
VIAL (ML) INJECTION PRN
Status: DISCONTINUED | OUTPATIENT
Start: 2018-01-19 | End: 2018-01-19

## 2018-01-19 RX ORDER — HYDROMORPHONE HYDROCHLORIDE 1 MG/ML
.3-.5 INJECTION, SOLUTION INTRAMUSCULAR; INTRAVENOUS; SUBCUTANEOUS EVERY 5 MIN PRN
Status: DISCONTINUED | OUTPATIENT
Start: 2018-01-19 | End: 2018-01-19 | Stop reason: HOSPADM

## 2018-01-19 RX ORDER — ONDANSETRON 4 MG/1
4 TABLET, ORALLY DISINTEGRATING ORAL EVERY 30 MIN PRN
Status: DISCONTINUED | OUTPATIENT
Start: 2018-01-19 | End: 2018-01-19 | Stop reason: HOSPADM

## 2018-01-19 RX ORDER — ONDANSETRON 2 MG/ML
INJECTION INTRAMUSCULAR; INTRAVENOUS PRN
Status: DISCONTINUED | OUTPATIENT
Start: 2018-01-19 | End: 2018-01-19

## 2018-01-19 RX ORDER — FENTANYL CITRATE 50 UG/ML
INJECTION, SOLUTION INTRAMUSCULAR; INTRAVENOUS PRN
Status: DISCONTINUED | OUTPATIENT
Start: 2018-01-19 | End: 2018-01-19

## 2018-01-19 RX ORDER — SCOLOPAMINE TRANSDERMAL SYSTEM 1 MG/1
1 PATCH, EXTENDED RELEASE TRANSDERMAL ONCE
Status: COMPLETED | OUTPATIENT
Start: 2018-01-19 | End: 2018-01-19

## 2018-01-19 RX ADMIN — GLYCOPYRROLATE 0.5 MG: 0.2 INJECTION, SOLUTION INTRAMUSCULAR; INTRAVENOUS at 14:28

## 2018-01-19 RX ADMIN — PROPOFOL 175 MCG/KG/MIN: 10 INJECTION, EMULSION INTRAVENOUS at 13:45

## 2018-01-19 RX ADMIN — DEXAMETHASONE SODIUM PHOSPHATE 4 MG: 4 INJECTION, SOLUTION INTRA-ARTICULAR; INTRALESIONAL; INTRAMUSCULAR; INTRAVENOUS; SOFT TISSUE at 14:14

## 2018-01-19 RX ADMIN — DEXMEDETOMIDINE HYDROCHLORIDE 8 MCG: 100 INJECTION, SOLUTION INTRAVENOUS at 13:49

## 2018-01-19 RX ADMIN — LEVOTHYROXINE SODIUM 150 MCG: 75 TABLET ORAL at 18:35

## 2018-01-19 RX ADMIN — KETOROLAC TROMETHAMINE 15 MG: 15 INJECTION, SOLUTION INTRAMUSCULAR; INTRAVENOUS at 08:23

## 2018-01-19 RX ADMIN — SUCCINYLCHOLINE CHLORIDE 80 MG: 20 INJECTION, SOLUTION INTRAMUSCULAR; INTRAVENOUS at 13:40

## 2018-01-19 RX ADMIN — KETOROLAC TROMETHAMINE 15 MG: 15 INJECTION, SOLUTION INTRAMUSCULAR; INTRAVENOUS at 02:37

## 2018-01-19 RX ADMIN — MIDAZOLAM 2 MG: 1 INJECTION INTRAMUSCULAR; INTRAVENOUS at 13:40

## 2018-01-19 RX ADMIN — DEXMEDETOMIDINE HYDROCHLORIDE 4 MCG: 100 INJECTION, SOLUTION INTRAVENOUS at 14:06

## 2018-01-19 RX ADMIN — HYDROMORPHONE HYDROCHLORIDE 0.2 MG: 1 INJECTION, SOLUTION INTRAMUSCULAR; INTRAVENOUS; SUBCUTANEOUS at 00:56

## 2018-01-19 RX ADMIN — NEOSTIGMINE METHYLSULFATE 3.5 MG: 1 INJECTION INTRAMUSCULAR; INTRAVENOUS; SUBCUTANEOUS at 14:28

## 2018-01-19 RX ADMIN — ROCURONIUM BROMIDE 30 MG: 10 INJECTION INTRAVENOUS at 14:03

## 2018-01-19 RX ADMIN — ONDANSETRON 4 MG: 2 INJECTION INTRAMUSCULAR; INTRAVENOUS at 14:23

## 2018-01-19 RX ADMIN — PHENYLEPHRINE HYDROCHLORIDE 100 MCG: 10 INJECTION INTRAVENOUS at 14:08

## 2018-01-19 RX ADMIN — KETOROLAC TROMETHAMINE 15 MG: 15 INJECTION, SOLUTION INTRAMUSCULAR; INTRAVENOUS at 18:36

## 2018-01-19 RX ADMIN — PHENYLEPHRINE HYDROCHLORIDE 150 MCG: 10 INJECTION INTRAVENOUS at 14:14

## 2018-01-19 RX ADMIN — SODIUM CHLORIDE, POTASSIUM CHLORIDE, SODIUM LACTATE AND CALCIUM CHLORIDE: 600; 310; 30; 20 INJECTION, SOLUTION INTRAVENOUS at 13:37

## 2018-01-19 RX ADMIN — SODIUM CHLORIDE, POTASSIUM CHLORIDE, SODIUM LACTATE AND CALCIUM CHLORIDE: 600; 310; 30; 20 INJECTION, SOLUTION INTRAVENOUS at 12:25

## 2018-01-19 RX ADMIN — FENTANYL CITRATE 50 MCG: 50 INJECTION, SOLUTION INTRAMUSCULAR; INTRAVENOUS at 13:40

## 2018-01-19 RX ADMIN — LIDOCAINE HYDROCHLORIDE 100 MG: 20 INJECTION, SOLUTION INFILTRATION; PERINEURAL at 13:40

## 2018-01-19 RX ADMIN — PROPOFOL 170 MG: 10 INJECTION, EMULSION INTRAVENOUS at 13:40

## 2018-01-19 RX ADMIN — PHENYLEPHRINE HYDROCHLORIDE 200 MCG: 10 INJECTION INTRAVENOUS at 14:23

## 2018-01-19 RX ADMIN — PHENYLEPHRINE HYDROCHLORIDE 100 MCG: 10 INJECTION INTRAVENOUS at 14:17

## 2018-01-19 RX ADMIN — ROCURONIUM BROMIDE 20 MG: 10 INJECTION INTRAVENOUS at 13:54

## 2018-01-19 RX ADMIN — DEXMEDETOMIDINE HYDROCHLORIDE 8 MCG: 100 INJECTION, SOLUTION INTRAVENOUS at 14:05

## 2018-01-19 RX ADMIN — SODIUM CHLORIDE: 9 INJECTION, SOLUTION INTRAVENOUS at 02:18

## 2018-01-19 RX ADMIN — FENTANYL CITRATE 50 MCG: 50 INJECTION, SOLUTION INTRAMUSCULAR; INTRAVENOUS at 13:53

## 2018-01-19 RX ADMIN — SCOLOPAMINE TRANSDERMAL SYSTEM 1 PATCH: 1 PATCH, EXTENDED RELEASE TRANSDERMAL at 13:55

## 2018-01-19 ASSESSMENT — ENCOUNTER SYMPTOMS: ORTHOPNEA: 0

## 2018-01-19 NOTE — ANESTHESIA CARE TRANSFER NOTE
Patient: Lety Baldwin    Procedure(s):  ENDOSCOPIC RETROGRADE CHOLANGIOPANCREATOGRAM (ERCP), SPHINCTEROTOMY, STONE EXTRACTION - Wound Class: II-Clean Contaminated   - Wound Class: II-Clean Contaminated    Diagnosis: STONES  Diagnosis Additional Information: No value filed.    Anesthesia Type:   General, ETT     Note:  Airway :Face Mask  Patient transferred to:PACU  Comments: Spontaneous respirations, tidal volume > 500, oxygen saturation > 97%, TOF 4/4 with > 5 seconds sustained tetany, follows commands.  Suctioned and extubated with cuff down to facemask.  Exchanging well.Transferred to PACU, spontaneous respirations, 10L oxygen via facemask.  All monitors and alarms on and functioning, VSS.  Patient awake, comfortable.  Report to PACU RN.Handoff Report: Identifed the Patient, Identified the Reponsible Provider, Reviewed the pertinent medical history, Discussed the surgical course, Reviewed Intra-OP anesthesia mangement and issues during anesthesia, Set expectations for post-procedure period and Allowed opportunity for questions and acknowledgement of understanding      Vitals: (Last set prior to Anesthesia Care Transfer)    CRNA VITALS  1/19/2018 1407 - 1/19/2018 1446      1/19/2018             Pulse: 85    SpO2: 100 %    Resp Rate (observed): 20    Resp Rate (set): 10                Electronically Signed By: HAMILTON Desir CRNA  January 19, 2018  2:46 PM

## 2018-01-19 NOTE — PLAN OF CARE
Problem: Patient Care Overview  Goal: Plan of Care/Patient Progress Review  Outcome: Improving  Pt A&O VSS RA. Pain control with IV Dilaudid and Toradol. Pt has small amount of emesis at this shift. Gave Zofran X1 effective.  Brest incision covered CDI, Lap site CDI. Ice applied.  NPO after [place]. Continue  IV infusing. Plan ERCP at 1300 this morning continue to  Monitor.

## 2018-01-19 NOTE — CONSULTS
GASTROENTEROLOGY  CONSULTATION       REASON FOR CONSULTATION:  Choledocholithiasis.      REQUESTING PHYSICIAN:  Dr. Figueroa.      HISTORY OF PRESENT ILLNESS:  Lety Baldwin is a pleasant 62-year-old female who underwent laparoscopic cholecystectomy yesterday and was found to have choledocholithiasis.  She has had symptomatic cholelithiasis for the past year and a half.  Her first episode of symptomatic cholelithiasis occurred when she was in [place] renewing her wedding vows.  She has had about 12 episodes since then.  This fall she was found to have cholelithiasis, but she waited to January to have a cholecystectomy.  Yesterday she had a combined cholecystectomy and a revision of her breast implants.  Intraoperative cholangiogram was done and it showed multiple filling defects consistent with choledocholithiasis.  Liver tests were not done.  Liver tests were ordered today but have not been drawn yet.      The patient feels better today.  She has some abdominal pain and distention.  She denies any fevers, chills, nausea, vomiting, dizziness, headaches, cold symptoms, chest pain, shortness of breath.      PAST MEDICAL HISTORY:   1.  Breast cancer, status post mastectomy.   2.  Hypothyroidism.   3.  History of breast reconstruction and implants.      SOCIAL HISTORY:  The patient does not use tobacco, alcohol or illicit drugs.      FAMILY HISTORY:  No known family history of GI disease.      REVIEW OF SYSTEMS:  A 10-point review of systems was done and was negative other than those mentioned in HPI.      PHYSICAL EXAMINATION:   VITAL SIGNS:  Temperature 98, pulse 67, respiratory rate 16, BP is 115/68, pulse ox 98% on room air.   GENERAL:  The patient is pleasant, awake, alert and oriented, no acute distress.   HEENT:  Normocephalic, atraumatic.  Pupils equal, round, reactive to light.  Extraocular eye muscles intact.  Sclerae anicteric.  Oropharynx is clear.  Mucous membranes are moist.   NECK:  Supple without  lymphadenopathy.  There is no thyromegaly.   CHEST:  Lungs are clear to auscultation bilaterally.   HEART:  Regular rate and rhythm.   ABDOMEN:  Soft, mildly distended, diffusely tender, no rebound or guarding.  Bowel sounds are present.   EXTREMITIES:  Warm, without lower extremity edema.  There is no clubbing or cyanosis.   NEUROLOGIC:  Cranial nerves II-XII grossly intact.   DERMATOLOGIC:  Exam reveals no obvious rashes.   PSYCHIATRIC:  Exam revealed some anxiety.      ASSESSMENT AND PLAN:  The patient is a 62-year-old female with apparent choledocholithiasis.  We will plan to perform an endoscopic retrograde cholangiopancreatoscopy later this afternoon.      Thank you for allowing me to participate in the care of this patient.  Please contact me with any questions or concerns.         IVON DOWD MD             D: 2018 11:25   T: 2018 11:59   MT: KRISHNA      Name:     SPENCER TOBIAS   MRN:      [id number]        Account:       [id number]   :      1955           Consult Date:  2018      Document: [id number]       cc: Ernestina Figueroa MD

## 2018-01-19 NOTE — ANESTHESIA PREPROCEDURE EVALUATION
"Procedure: Procedure(s):  ENDOSCOPIC RETROGRADE CHOLANGIOPANCREATOGRAM  Preop diagnosis: STONES  Allergies   Allergen Reactions     Codeine Nausea     Fesoterodine      Dizziness, dry mouth     Patient Active Problem List   Diagnosis     Breast cancer (H)     Acquired hypothyroidism     Disorder of bone and cartilage     CARDIOVASCULAR SCREENING; LDL GOAL LESS THAN 160     Advanced directives, counseling/discussion     Choledocholithiasis     Past Medical History:   Diagnosis Date     Disorder of bone and cartilage, unspecified 2004    -2.0 in 2007     Fracture of metatarsal bone of right foot 5/15     Malignant neoplasm of breast (female), unspecified site 8/97    Ductal Carcinoma in situ right     Other specified acquired hypothyroidism      PONV (postoperative nausea and vomiting)      Past Surgical History:   Procedure Laterality Date     C NONSPECIFIC PROCEDURE  9/00/97    Right Total Mastectomy- Reconstructive Surgery. Abstracted 5/15/02.     COLONOSCOPY N/A 10/8/2014    Procedure: COLONOSCOPY;  Surgeon: Jelani Moran MD;  Location:  GI     HC BIOPSY OF BREAST, OPEN INCISIONAL  1992    Rt     HC DILATION/CURETTAGE DIAG/THER NON OB  1995    D & C       No current facility-administered medications on file prior to encounter.   Current Outpatient Prescriptions on File Prior to Encounter:  levothyroxine (SYNTHROID/LEVOTHROID) 150 MCG tablet Take 1 tablet (150 mcg) by mouth daily   VIT CALCIUM CITRATE + D TABS 250-62.5 MG-IU OR    VITAMIN C 250 MG OR TABS 1 TABLET 3 TIMES DAILY (Patient taking differently: 1 TABLET  DAILY)     /64 (BP Location: Right arm)  Temp 35.9  C (96.6  F) (Oral)  Resp 18  Ht 1.702 m (5' 7\")  Wt 68.8 kg (151 lb 11.2 oz)  LMP 01/01/2004  SpO2 94%  BMI 23.76 kg/m2    Lab Results   Component Value Date    WBC 7.2 08/02/2005     Lab Results   Component Value Date    RBC 4.33 08/02/2005     Lab Results   Component Value Date    HGB 13.0 08/02/2005     Lab Results   Component " Value Date    HCT 37.3 08/02/2005     Lab Results   Component Value Date    MCV 86 08/02/2005     Lab Results   Component Value Date    MCH 30.0 08/02/2005     Lab Results   Component Value Date    MCHC 34.9 08/02/2005     Lab Results   Component Value Date    RDW 14.3 08/02/2005     Lab Results   Component Value Date     08/02/2005     No results found for: INR    Last Basic Metabolic Panel:  Lab Results   Component Value Date     10/06/2017      Lab Results   Component Value Date    POTASSIUM 3.9 10/06/2017     Lab Results   Component Value Date    CHLORIDE 103 10/06/2017     Lab Results   Component Value Date    DEEPAK 9.2 10/06/2017     Lab Results   Component Value Date    CO2 28 10/06/2017     Lab Results   Component Value Date    BUN 12 10/06/2017     Lab Results   Component Value Date    CR 0.60 01/18/2018     Lab Results   Component Value Date    GLC 90 10/06/2017     Anesthesia Evaluation     . Pt has had prior anesthetic.     History of anesthetic complications   - PONV and motion sickness        ROS/MED HX    ENT/Pulmonary:  - neg pulmonary ROS    (-) sleep apnea   Neurologic:       Cardiovascular:  - neg cardiovascular ROS      (-) hypertension, CHF and orthopnea/PND   METS/Exercise Tolerance:     Hematologic:         Musculoskeletal:         GI/Hepatic: Comment: S/p laparoscopic cholecystectomy / breast reconstruction with new implants 1/18/2018       (-) GERD   Renal/Genitourinary:         Endo:     (+) thyroid problem hypothyroidism, .   (-) Type II DM   Psychiatric:         Infectious Disease:         Malignancy:   (+) Malignancy History of Breast  Breast CA Remission status post.         Other:                     Physical Exam  Normal systems: cardiovascular, pulmonary and dental    Airway   Mallampati: II  TM distance: >3 FB  Neck ROM: full    Dental     Cardiovascular   Rhythm and rate: regular and normal      Pulmonary    breath sounds clear to auscultation                     Anesthesia Plan      History & Physical Review  History and physical reviewed and following examination; no interval change.    ASA Status:  2 .    NPO Status:  > 8 hours    Plan for General and ETT with Propofol induction. Maintenance will be TIVA.    PONV prophylaxis:  Ondansetron (or other 5HT-3), Dexamethasone or Solumedrol and Scopolamine patch  Propofol infusion      Postoperative Care  Postoperative pain management:  IV analgesics.      Consents  Anesthetic plan, risks, benefits and alternatives discussed with:  Patient and Spouse..                          .

## 2018-01-19 NOTE — ANESTHESIA POSTPROCEDURE EVALUATION
Patient: Lety Baldwin    Procedure(s):  ENDOSCOPIC RETROGRADE CHOLANGIOPANCREATOGRAM (ERCP), SPHINCTEROTOMY, STONE EXTRACTION - Wound Class: II-Clean Contaminated   - Wound Class: II-Clean Contaminated    Diagnosis:STONES  Diagnosis Additional Information: No value filed.    Anesthesia Type:  General, ETT    Note:  Anesthesia Post Evaluation    Patient location during evaluation: PACU  Patient participation: Able to fully participate in evaluation  Level of consciousness: awake  Pain management: adequate  Airway patency: patent  Cardiovascular status: acceptable  Respiratory status: acceptable  Hydration status: acceptable  PONV: none     Anesthetic complications: None          Last vitals:  Vitals:    01/19/18 1530 01/19/18 1540 01/19/18 1544   BP: 108/66 110/70    Resp: 15 12    Temp: 37.3  C (99.1  F) 37.4  C (99.3  F)    SpO2: 97% 96% 96%         Electronically Signed By: Triston Hdez MD  January 19, 2018  4:17 PM

## 2018-01-19 NOTE — PLAN OF CARE
Problem: Patient Care Overview  Goal: Plan of Care/Patient Progress Review  Outcome: Improving  Pt a/o. VSS. Very minimal pain. Lap site CDI. Ice applied. On clears. NPO after MN. PCD on. IV infusing. ERCP at 1300. Will  Monitor.

## 2018-01-19 NOTE — OP NOTE
DATE OF PROCEDURE:  01/18/2018      PREOPERATIVE DIAGNOSIS:   1.  History of right breast carcinoma, status post right mastectomy and reconstruction with implant and placement of left-sided implant for symmetry.   2.  Bilateral rozina-implant contracture and breast asymmetry.      POSTOPERATIVE DIAGNOSIS:     1.  History of right breast carcinoma, status post right mastectomy and reconstruction with implant and placement of left-sided implant for symmetry.   2.  Bilateral rozina-implant contracture and breast asymmetry.      PROCEDURE:     1.  Bilateral capsulotomies.   2.  Exchange of bilateral breast implants.      ATTENDING SURGEON:  Derrell Diaz MD      ANESTHESIA:  General.      INDICATIONS:  The patient is a 62-year-old female who has a history of right-sided breast carcinoma.  Approximately 20 years ago she underwent a right-sided mastectomy, as well as a reconstruction using a tissue expander and implant.  She has a saline implant in place.  She also has a saline implant on the left breast just to help improve symmetry. Her right-sided implant is an Allergan 163, 360 mL implant filled with 390 mL, and on the left she has a style 468 implant filled to 195 mL.  She initially did very well for many years but has gradually developed increasing capsular contractures and increasing asymmetry.  She now would like to undergo revision.  She has some discomfort in the breasts as well due to the tightness of the contractures.  I have discussed options with her and I have recommended bilateral capsulotomies and replacement with new implants.  We have discussed going to silicone gel implants and she would like to do that after reviewing pros and cons of each.  Potential risks and complications have also been reviewed and she understands and presents now for surgery.  Of note, the patient will also be undergoing a laparoscopic cholecystectomy today done by Dr. Peter Winslow.       PROCEDURE:  The patient was first marked  preoperatively in the upright position.  She was then taken to the operating room and placed in supine position.  General endotracheal anesthesia was administered without complications.  The chest was then prepped and draped in a sterile fashion.  The left side was first addressed.  The inframammary crease scar was reopened by excising the scar and dissection proceeded down to the implant capsule.  This was opened with electrocautery and the implant was removed.  It was noted to be completely intact.  The capsule was very tight, however, and we therefore did a full capsulotomy around the entire periphery of the pocket.  This nicely released the pocket.  Hemostasis was maintained throughout with electrocautery.  We then infiltrated the pocket with 0.25% Marcaine with epinephrine and irrigated with antibiotic saline solution. After confirming hemostasis we then tried a sizer.  On the left side we initially tried the Allergan SSLP 235 mL sizer.      Attention was then directed to the right side.  The old mastectomy scar on the lateral breast was excised and dissection proceeded down to the implant capsule which was opened. The implant was noted to be completely intact.  Since this was a larger implant we first drained it to make the removal easier.  We then easily removed the implant without difficulty.  The implant had been completely intact.  The pocket was then noted again to be quite tight and we again did a complete capsulotomy around the entire periphery of the pocket. Adequate dissection was done to free up the pocket nicely.  Hemostasis was maintained with electrocautery.  We again injected with 0.25% Marcaine with epinephrine using a total of 50 mL.  Irrigation was again done with antibiotic saline solution.  We then tried a sizer on the right side.  On the right we used the Allergan style 410 FF anatomical implant, 425 mL size.  This was positioned into the pocket and the patient was then sat upright.  This  seemed to create very good symmetry and aesthetics.  She was again placed supine and the sizers were removed and then the corresponding implants were placed using the style 410 FF, 425 mL implant on the right and the SSLP 235 mL implant on the left.  Closure was performed with a deep running stitch of 2-0 Vicryl, then deep dermal and running subcuticular suture of 3-0 Monocryl.  Steri-Strips were applied. The patient tolerated this well.  She remained on the operating table to undergo the laparoscopic cholecystectomy by Dr. Peter Winslow and this will be dictated separately.  Estimated blood loss for my portion of the procedure was approximately 10 mL.  The patient tolerated this very well.         LOUISE THOMAS MD             D: 2018 15:15   T: 2018 20:27   MT: FALLON      Name:     SPENCER TOBIAS   MRN:      [id number]        Account:        [id number]   :      1955           Procedure Date: 2018      Document: [id number]

## 2018-01-19 NOTE — PROGRESS NOTES
Pt/ seen after ERCP and ERS  Happy to be done, planning on DC tomorrow  Incisions look good, minimal abdominal pain  Orders in for DC tomorrow AM per nursing  Peter Winslow MD  1/19/2018 4:08 PM

## 2018-01-20 VITALS
SYSTOLIC BLOOD PRESSURE: 103 MMHG | OXYGEN SATURATION: 95 % | RESPIRATION RATE: 16 BRPM | TEMPERATURE: 97.1 F | BODY MASS INDEX: 23.81 KG/M2 | DIASTOLIC BLOOD PRESSURE: 60 MMHG | HEIGHT: 67 IN | WEIGHT: 151.7 LBS

## 2018-01-20 LAB — GLUCOSE BLDC GLUCOMTR-MCNC: 100 MG/DL (ref 70–99)

## 2018-01-20 PROCEDURE — 25000128 H RX IP 250 OP 636: Performed by: SURGERY

## 2018-01-20 PROCEDURE — 82962 GLUCOSE BLOOD TEST: CPT

## 2018-01-20 PROCEDURE — 25000132 ZZH RX MED GY IP 250 OP 250 PS 637: Performed by: SURGERY

## 2018-01-20 RX ADMIN — KETOROLAC TROMETHAMINE 15 MG: 15 INJECTION, SOLUTION INTRAMUSCULAR; INTRAVENOUS at 01:35

## 2018-01-20 RX ADMIN — KETOROLAC TROMETHAMINE 15 MG: 15 INJECTION, SOLUTION INTRAMUSCULAR; INTRAVENOUS at 08:22

## 2018-01-20 RX ADMIN — LEVOTHYROXINE SODIUM 150 MCG: 75 TABLET ORAL at 08:16

## 2018-01-20 NOTE — PLAN OF CARE
Problem: Patient Care Overview  Goal: Plan of Care/Patient Progress Review  Outcome: Adequate for Discharge Date Met: 01/20/18  Patient discharged to home  by wheelchair at 10:46 AM 01/20/18.  Medication regimen and new medications discussed with patient and patient verbalizes understanding. Diet and activity and wound care discussed with patient. Upcoming appointments reviewed. No questions at this time.  Patient A&O, VSS,RA. Denied pain. Family present. Medication and discharge instruction handed to patient. No questions at this time.

## 2018-01-20 NOTE — PROGRESS NOTES
"GASTROENTEROLOGY PROGRESS NOTE       SUBJECTIVE:  feels well after ERCP- diet tolerated     OBJECTIVE:  /61 (BP Location: Left arm)  Temp 96.2  F (35.7  C) (Oral)  Resp 16  Ht 1.702 m (5' 7\")  Wt 68.8 kg (151 lb 11.2 oz)  LMP 2004  SpO2 96%  BMI 23.76 kg/m2  Temp (24hrs), Av.3  F (36.8  C), Min:96.2  F (35.7  C), Max:99.3  F (37.4  C)    Patient Vitals for the past 72 hrs:   Weight   18 1041 68.8 kg (151 lb 11.2 oz)       Intake/Output Summary (Last 24 hours) at 18 0803  Last data filed at 18 2200   Gross per 24 hour   Intake             2460 ml   Output                0 ml   Net             2460 ml          PHYSICAL EXAM  NAD  Abd soft NT           Additional Comments:  ROS, FH, SH: See initial GI consult for details.     I have reviewed the patient's new clinical lab results:     Recent Labs   Lab Test  18   1120  18   0753   HGB   --   13.6   INR  1.11   --      Recent Labs   Lab Test  10/06/17   1106  16   0949  09/28/15   0930   POTASSIUM  3.9  4.1  3.9   CHLORIDE  103  103  103   CO2  28  27  29   BUN  12  11  13   ANIONGAP  9  8  6     Recent Labs   Lab Test  18   1120  04/08/10   1830   ALBUMIN  3.5   --    BILITOTAL  0.8   --    ALT  43   --    AST  31   --    PROTEIN   --   Negative           Active Problems:    Choledocholithiasis   Discussed ERCP with pt- OK to DC- call if issues- will S/O    Sudhir Erickson MD  [facility]  Office:  [phone]  Pager:  [phone] M-Th 8am to 5pm, Fri 8am to 4pm    "

## 2018-01-20 NOTE — PLAN OF CARE
Problem: Patient Care Overview  Goal: Plan of Care/Patient Progress Review  Outcome: Improving  A&Ox4. VSS on RA. Up independently. Pain controlled with IV toradol. Voiding. Tolerating clear liquids overnight-advanced to regular diet this AM. Plan to d/c today.

## 2018-01-20 NOTE — PLAN OF CARE
Problem: Patient Care Overview  Goal: Discharge Needs Assessment  Outcome: Improving  A&Ox4, VSS, pain relieved with toradol, CMS intact, dressing CDI, tolerating clear liquids, up independent, voiding w/o difficulty.

## 2018-01-22 ENCOUNTER — TELEPHONE (OUTPATIENT)
Dept: SURGERY | Facility: CLINIC | Age: 63
End: 2018-01-22

## 2018-01-22 NOTE — TELEPHONE ENCOUNTER
1/22/2018    Patient had a laparoscopic cholecystectomy with intraoperative cholangiograms and coordinated implant removal and replacement on 1/18/2018 and and ERCP on 1/19/2018. She calls today to report generalized fatigue and abdominal discomfort which she reports is unrelated to eating or activity and it has been 5 days since her last BM. She has been taking metamucil at home. I recommended that she start with Miralax and stool softener for constipation and drink plenty of water. I also advised her to call back if her symptoms worsen to fail to improve. Also, she reports that her pain is not severe enough to require narcotic pain control and I recommended tylenol or ibuprofen if tolerated. She denies signs or symptoms of infection. She is in agreement with this plan.     Julieth Marte

## 2018-01-25 ENCOUNTER — ALLIED HEALTH/NURSE VISIT (OUTPATIENT)
Dept: SURGERY | Facility: CLINIC | Age: 63
End: 2018-01-25
Payer: COMMERCIAL

## 2018-01-25 VITALS
BODY MASS INDEX: 23.7 KG/M2 | HEART RATE: 80 BPM | HEIGHT: 67 IN | WEIGHT: 151 LBS | SYSTOLIC BLOOD PRESSURE: 110 MMHG | RESPIRATION RATE: 16 BRPM | OXYGEN SATURATION: 100 % | DIASTOLIC BLOOD PRESSURE: 64 MMHG

## 2018-01-25 DIAGNOSIS — Z09 SURGICAL FOLLOWUP VISIT: Primary | ICD-10-CM

## 2018-01-25 PROCEDURE — 99024 POSTOP FOLLOW-UP VISIT: CPT | Performed by: SURGERY

## 2018-01-25 RX ORDER — INFLUENZA A VIRUS A/NEBRASKA/14/2019 (H1N1) ANTIGEN (MDCK CELL DERIVED, PROPIOLACTONE INACTIVATED), INFLUENZA A VIRUS A/DELAWARE/39/2019 (H3N2) ANTIGEN (MDCK CELL DERIVED, PROPIOLACTONE INACTIVATED), INFLUENZA B VIRUS B/SINGAPORE/INFTT-16-0610/2016 ANTIGEN (MDCK CELL DERIVED, PROPIOLACTONE INACTIVATED), INFLUENZA B VIRUS B/DARWIN/7/2019 ANTIGEN (MDCK CELL DERIVED, PROPIOLACTONE INACTIVATED) 15; 15; 15; 15 UG/.5ML; UG/.5ML; UG/.5ML; UG/.5ML
INJECTION, SUSPENSION INTRAMUSCULAR
Refills: 0 | COMMUNITY
Start: 2017-11-04 | End: 2018-10-04

## 2018-01-25 NOTE — PROGRESS NOTES
She returns today in follow-up after laparoscopic cholecystectomy.  Intraoperative cholangiogram showed distal common bile duct stones.  She underwent ERCP with removal of stones the following day.  She was then discharged by Dr. Erickson (discussed with him).  Pathology of her gallbladder showed cholelithiasis with chronic cholecystitis.  Reports that she has continued persistent fatigue although she is otherwise doing quite well, with no pain or other symptoms.  She is somewhat discouraged that she does not feel more energetic at this point.    Exam: Incisions are healing nicely, there is no sign of infection hematoma or other problems.  The abdomen is soft and nontender.    Impression: Excellent objective postoperative recovery, persistent fatigue    Plan: She will continue to rest and rehabilitate for the rest of the week and intends to return to work next week on Monday.  She will contact us if she has any further questions or problems.  Peter Winslow MD  1/25/2018 4:35 PM  Please route or send letter to:  Primary Care Provider (PCP) and Include Progress Note

## 2018-01-25 NOTE — MR AVS SNAPSHOT
"              After Visit Summary   1/25/2018    Lety Baldwin    MRN: [id number]           Patient Information     Date Of Birth          1955        Visit Information        Provider Department      1/25/2018 4:15 PM Peter Winslow MD [facility] General Surgery      Today's Diagnoses     Surgical followup visit    -  1       Follow-ups after your visit        Who to contact     If you have questions or need follow up information about today's clinic visit or your schedule please contact [facility] directly at [phone].  Normal or non-critical lab and imaging results will be communicated to you by AppPowerGrouphart, letter or phone within 4 business days after the clinic has received the results. If you do not hear from us within 7 days, please contact the clinic through [employer] or phone. If you have a critical or abnormal lab result, we will notify you by phone as soon as possible.  Submit refill requests through [employer] or call your pharmacy and they will forward the refill request to us. Please allow 3 business days for your refill to be completed.          Additional Information About Your Visit        MyChart Information     [employer] gives you secure access to your electronic health record. If you see a primary care provider, you can also send messages to your care team and make appointments. If you have questions, please call your primary care clinic.  If you do not have a primary care provider, please call [phone] and they will assist you.        Care EveryWhere ID     This is your Care EveryWhere ID. This could be used by other organizations to access your [facility] medical records  [id number]        Your Vitals Were     Pulse Respirations Height Last Period Pulse Oximetry BMI (Body Mass Index)    80 16 5' 7\" (1.702 m) 01/01/2004 100% 23.65 kg/m2       Blood Pressure from Last 3 Encounters:   01/25/18 110/64   01/20/18 103/60 "   01/16/18 122/62    Weight from Last 3 Encounters:   01/25/18 151 lb (68.5 kg)   01/18/18 151 lb 11.2 oz (68.8 kg)   01/16/18 154 lb 8 oz (70.1 kg)              Today, you had the following     No orders found for display         Today's Medication Changes          These changes are accurate as of 1/25/18  4:35 PM.  If you have any questions, ask your nurse or doctor.               These medicines have changed or have updated prescriptions.        Dose/Directions    ascorbic acid 250 MG tablet   Commonly known as:  VITAMIN C   This may have changed:  See the new instructions.        1 TABLET 3 TIMES DAILY   Refills:  0       VIT CALCIUM CITRATE + D TABS 250-62.5 MG-IU OR   This may have changed:  See the new instructions.        Refills:  0                Primary Care Provider Office Phone # Fax #    Ernestina Figueroa [phone]       [address] [facility] [address]  [facility] [address]        Equal Access to Services     MELODY RICHARDSON AH: Hadii lorena eubanks hadasho Soomaali, waaxda luqadaha, qaybta kaalmada adeegyada, jessica fountain . So [facility] [phone].    ATENCIÓN: Si habla español, tiene a ibrahim disposición servicios gratuitos de asistencia lingüística. Llame al [phone].    We comply with applicable federal civil rights laws and [place] laws. We do not discriminate on the basis of race, color, national origin, age, disability, sex, sexual orientation, or gender identity.            Thank you!     Thank you for choosing [facility]  for your care. Our goal is always to provide you with excellent care. Hearing back from our patients is one way we can continue to improve our services. Please take a few minutes to complete the written survey that you may receive in the mail after your visit with us. Thank you!             Your Updated Medication List - Protect others around you: Learn how to safely use, store and throw away your medicines at www.disposemymeds.org.           This list is accurate as of 1/25/18  4:35 PM.  Always use your most recent med list.                   Brand Name Dispense Instructions for use Diagnosis    ascorbic acid 250 MG tablet    VITAMIN C     1 TABLET 3 TIMES DAILY        FLUCELVAX QUADRIVALENT 0.5 ML Yaa   Generic drug:  Influenza Vac Subunit Quad      TO BE ADMINISTERED BY PHARMACIST FOR IMMUNIZATION        levothyroxine 150 MCG tablet    SYNTHROID/LEVOTHROID    90 tablet    Take 1 tablet (150 mcg) by mouth daily    Acquired hypothyroidism       VIT CALCIUM CITRATE + D TABS 250-62.5 MG-IU OR

## 2018-05-07 ENCOUNTER — MYC MEDICAL ADVICE (OUTPATIENT)
Dept: INTERNAL MEDICINE | Facility: CLINIC | Age: 63
End: 2018-05-07

## 2018-05-07 DIAGNOSIS — M65.30 TRIGGER FINGER, ACQUIRED: Primary | ICD-10-CM

## 2018-05-14 ENCOUNTER — OFFICE VISIT (OUTPATIENT)
Dept: ORTHOPEDICS | Facility: CLINIC | Age: 63
End: 2018-05-14
Payer: COMMERCIAL

## 2018-05-14 VITALS
SYSTOLIC BLOOD PRESSURE: 112 MMHG | WEIGHT: 150 LBS | BODY MASS INDEX: 23.54 KG/M2 | HEIGHT: 67 IN | DIASTOLIC BLOOD PRESSURE: 68 MMHG

## 2018-05-14 DIAGNOSIS — M65.332 TRIGGER MIDDLE FINGER OF LEFT HAND: Primary | ICD-10-CM

## 2018-05-14 PROCEDURE — 99213 OFFICE O/P EST LOW 20 MIN: CPT | Mod: 25 | Performed by: FAMILY MEDICINE

## 2018-05-14 PROCEDURE — 20550 NJX 1 TENDON SHEATH/LIGAMENT: CPT | Mod: F2 | Performed by: FAMILY MEDICINE

## 2018-05-14 RX ORDER — METHYLPREDNISOLONE ACETATE 40 MG/ML
40 INJECTION, SUSPENSION INTRA-ARTICULAR; INTRALESIONAL; INTRAMUSCULAR; SOFT TISSUE ONCE
Qty: 1 ML | Refills: 0 | OUTPATIENT
Start: 2018-05-14 | End: 2018-05-14

## 2018-05-14 NOTE — PROGRESS NOTES
[facility]   Clinic Visit s May 14, 2018    PCP: Ernestina Figueroa ADAM Davidson is a 62 year old female who is seen in consultation at the request of Dr. Figueroa for   Chief Complaint   Patient presents with     Musculoskeletal Problem     left middle finger pain       Injury: Patient reports insidious onset without acute precipitating event. She likes to sofia.      right-hand dominant    Location of Pain: left middle finger/A1 pulley  Duration of Pain: 2-3 months   Rating of Pain at worst: 4/10  Rating of Pain Currently: 2/10  Pain is better with: splint  Pain is worse with: crocheting,    Treatment so far consists of: splint, rest,   Associated symptoms: catching, locking  Recent imaging completed: No recent imaging completed.  Prior History of related problems: none    Social History:      Past Medical History:   Diagnosis Date     Disorder of bone and cartilage, unspecified 2004    -2.0 in 2007     Fracture of metatarsal bone of right foot 5/15     Malignant neoplasm of breast (female), unspecified site 8/97    Ductal Carcinoma in situ right     Other specified acquired hypothyroidism      PONV (postoperative nausea and vomiting)        Patient Active Problem List    Diagnosis Date Noted     Choledocholithiasis 01/18/2018     Priority: Medium     Advanced directives, counseling/discussion 09/28/2015     Priority: Medium     Information given to pt to take home and review. 9/28/15       CARDIOVASCULAR SCREENING; LDL GOAL LESS THAN 160 10/31/2010     Priority: Medium     Disorder of bone and cartilage      Priority: Medium     -2.0 in 2007  Problem list name updated by automated process. Provider to review       Breast cancer (H) 08/05/2003     Priority: Medium     Acquired hypothyroidism 08/05/2003     Priority: Medium     Problem list name updated by automated process. Provider to review         Family History   Problem Relation Age of Onset     DIABETES Father       Hypertension Mother      Alzheimer Disease Mother      Lipids Mother      Cancer - colorectal Mother      Colon Cancer Mother 78     Arthritis Maternal Grandmother      C.A.D. Maternal Grandmother      Cancer - colorectal Maternal Grandfather      Alzheimer Disease Maternal Grandfather      CEREBROVASCULAR DISEASE Paternal Grandmother        Social History     Social History     Marital status:      Spouse name: N/A     Number of children: 2     Years of education: N/A     Occupational History           Social History Main Topics     Smoking status: Never Smoker     Smokeless tobacco: Never Used     Alcohol use No       Past Surgical History:   Procedure Laterality Date     BREAST SURGERY  01/18/2018    bilat breast implant exchange     C NONSPECIFIC PROCEDURE  9/00/97    Right Total Mastectomy- Reconstructive Surgery. Abstracted 5/15/02.     CHOLECYSTECTOMY       COLONOSCOPY N/A 10/8/2014    Procedure: COLONOSCOPY;  Surgeon: Jelani Moran MD;  Location:  GI     ENDOSCOPIC RETROGRADE CHOLANGIOPANCREATOGRAM N/A 1/19/2018    Procedure: COMBINED ENDOSCOPIC RETROGRADE CHOLANGIOPANCREATOGRAPHY, SPHINCTEROTOMY;  ENDOSCOPIC RETROGRADE CHOLANGIOPANCREATOGRAM (ERCP), SPHINCTEROTOMY, STONE EXTRACTION;  Surgeon: Patsy Mcclure MD;  Location:  OR     ENDOSCOPIC RETROGRADE CHOLANGIOPANCREATOGRAM N/A 1/19/2018    Procedure: COMBINED ENDOSCOPIC RETROGRADE CHOLANGIOPANCREATOGRAPHY, REMOVE STONE/BALLOON;;  Surgeon: Patsy Mcclure MD;  Location:  OR     HC BIOPSY OF BREAST, OPEN INCISIONAL  1992    Rt     HC DILATION/CURETTAGE DIAG/THER NON OB  1995    D & C     LAPAROSCOPIC CHOLECYSTECTOMY WITH CHOLANGIOGRAMS N/A 1/18/2018    Procedure: LAPAROSCOPIC CHOLECYSTECTOMY WITH CHOLANGIOGRAMS;;  Surgeon: Peter Winslow MD;  Location:  [facility]     RECONSTRUCT BREAST BILATERAL, IMPLANT PROSTHESIS BILATERAL, COMBINED Bilateral 1/18/2018    Procedure: COMBINED RECONSTRUCT BREAST BILATERAL,  "IMPLANT PROSTHESIS BILATERAL;  BILATERAL BREAST RECONSTRUCTION WITH EXCHANGE OF GEL IMPLANTS AND CAPSULOTOMIES, LAPAROSCOPIC CHOLECYSTECTOMY WITH CHOLANGIOGRAMS;  Surgeon: Derrell Diaz MD;  Location: [facility]             Review of Systems   Musculoskeletal: Positive for joint pain.   All other systems reviewed and are negative.        Physical Exam  Ht 5' 7\" (1.702 m)  Wt 150 lb (68 kg)  LMP 01/01/2004  BMI 23.49 kg/m2  Constitutional:well-developed, well-nourished, and in no distress.   Cardiovascular: Intact distal pulses.    Neurological: alert. Gait Normal:   Gait, station, stance, and balance appear normal for age  Skin: Skin is warm and dry.   Psychiatric: Mood and affect normal.   Respiratory: unlabored, speaks in full sentences  Lymph: no LAD, no lymphangitis          Left Hand/Wrist Exam   Sensation: Normal    Tenderness   The patient is experiencing tenderness in the palmar aspect of middle finger MCP.    Range of Motion   Wrist  Pronation:  Normal  Supination: Normal  Flexion:       Normal  Extension:  Normal  Hand  DIP Thumb: Normal  DIP Index:    Normal  DIP Middle:  Normal  DIP Ring:     Normal  DIP Little:     Normal  MP Thumb:  Normal  MP Index:     Normal  MP Middle:   Normal  MP Ring:      Normal  MP Little:      Normal  PIP Index:     Normal  PIP Middle:   Normal  PIP Ring:      Normal  PIP Little:      Normal    Muscle Strength   Wrist Extension:   5/5  Wrist Flexion:       5/5  :                      5/5    Tests   Phalen s Sign: n/t  Tinel s Sign (Medial Nerve): n/t  Finkelstein: Negative          Assessment:    ICD-10-CM    1. Trigger middle finger of left hand M65.332 METHYLPREDNISOLONE 40 MG INJ     methylPREDNISolone acetate (DEPO-MEDROL) 40 MG/ML injection     INJECTION SINGLE TENDON SHEATH/LIGAMENT     [facility] PT, HAND, AND CHIROPRACTIC REFERRAL       nature of trigger finger pathology discussed. Reviewed options including cortisone injections, hand therapy, or surgical " management.  Risks, benefits and complications of trigger finger injection with cortisone discussed with the patient. Common cosmetic effects associated with cortisone noted such as skin hypopigmentation and fragility and subcutaneous atrophy.  Generalized systemic effects of cortisone noted as well, including but not limited to anxiety, agitation, hypertension, hyperglycemia, bone density loss.  She elected to proceed.  Using sterile technique, the area was first prepped with Chloroprep.  A 25 gauge, 1 1/2 inch needle was used to inject 20 mg depomedrol and 1/2cc 1% lidocaine injected at the tendon sheath using the tendon nodule as a landmark. Pt tolerated will. Dressed with a bandaid. Discussed s/s of common adverse effects.  Await 1 week to determine full effect of injection. If recurrent could reinject, try hand therapy, or see hand surgery.    She desired to proceed with hand therapy also.

## 2018-05-14 NOTE — MR AVS SNAPSHOT
After Visit Summary   5/14/2018    Lety Baldwin    MRN: [id number]           Patient Information     Date Of Birth          1955        Visit Information        Provider Department      5/14/2018 8:00 AM Doug Ariza MD [facility]        Today's Diagnoses     Trigger middle finger of left hand    -  1       Follow-ups after your visit        Additional Services     [facility] PT, HAND, AND CHIROPRACTIC REFERRAL       **This order will print in the [facility] Scheduling Office**    Physical Therapy, Hand Therapy and Chiropractic Care are available through:    *[facility]  *[facility]  *[facility]    Call one number to schedule at any of the above locations: [phone].    Your provider has referred you to: Hand Therapy    Indication/Reason for Referral: Trigger middle finger of left hand    Onset of Illness:   Therapy Orders: Evaluate and Treat  Special Programs: None  Special Request: None    Shawn Escobar      Additional Comments for the Therapist or Chiropractor:     Please be aware that coverage of these services is subject to the terms and limitations of your health insurance plan.  Call member services at your health plan with any benefit or coverage questions.      Please bring the following to your appointment:    *Your personal calendar for scheduling future appointments  *Comfortable clothing                  Who to contact     If you have questions or need follow up information about today's clinic visit or your schedule please contact [facility] directly at [phone].  Normal or non-critical lab and imaging results will be communicated to you by MyChart, letter or phone within 4 business days after the clinic has received the results. If you do not hear from us within 7 days, please contact the clinic through MyChart or phone. If you have a critical or abnormal lab result, we will  "notify you by phone as soon as possible.  Submit refill requests through [facility] or call your pharmacy and they will forward the refill request to us. Please allow 3 business days for your refill to be completed.          Additional Information About Your Visit        [employer] Information     [facility] gives you secure access to your electronic health record. If you see a primary care provider, you can also send messages to your care team and make appointments. If you have questions, please call your primary care clinic.  If you do not have a primary care provider, please call [phone] and they will assist you.        Care EveryWhere ID     This is your Care EveryWhere ID. This could be used by other organizations to access your [facility] medical records  [id number]        Your Vitals Were     Height Last Period BMI (Body Mass Index)             5' 7\" (1.702 m) 01/01/2004 23.49 kg/m2          Blood Pressure from Last 3 Encounters:   05/14/18 112/68   01/25/18 110/64   01/20/18 103/60    Weight from Last 3 Encounters:   05/14/18 150 lb (68 kg)   01/25/18 151 lb (68.5 kg)   01/18/18 151 lb 11.2 oz (68.8 kg)              We Performed the Following     [facility] PT, HAND, AND CHIROPRACTIC REFERRAL     INJECTION SINGLE TENDON SHEATH/LIGAMENT     METHYLPREDNISOLONE 40 MG INJ          Today's Medication Changes          These changes are accurate as of 5/14/18  8:46 AM.  If you have any questions, ask your nurse or doctor.               Start taking these medicines.        Dose/Directions    methylPREDNISolone acetate 40 MG/ML injection   Commonly known as:  DEPO-MEDROL   Used for:  Trigger middle finger of left hand   Started by:  Doug Ariza MD        Dose:  40 mg   1 mL (40 mg) by INTRA-ARTICULAR route once for 1 dose   Quantity:  1 mL   Refills:  0         These medicines have changed or have updated prescriptions.        Dose/Directions    ascorbic acid 250 MG tablet   Commonly known as:  VITAMIN C   This may " have changed:  See the new instructions.        1 TABLET 3 TIMES DAILY   Refills:  0       VIT CALCIUM CITRATE + D TABS 250-62.5 MG-IU OR   This may have changed:  See the new instructions.        Refills:  0            Where to get your medicines      Some of these will need a paper prescription and others can be bought over the counter.  Ask your nurse if you have questions.     You don't need a prescription for these medications     methylPREDNISolone acetate 40 MG/ML injection                Primary Care Provider Office Phone # Fax #    Ernestina Figueroa [phone]       Jose David [facility] NICOLLET [address] [address]        Equal Access to Services     [facility]: Hadii lorena deluca Sogray, waaxethan palacio, qaybta kaalmaethan cummins, jessica fountain . So [facility] [phone].    ATENCIÓN: Si habla español, tiene a ibrahim disposición servicios gratuitos de asistencia lingüística. [facility] [phone].    We comply with applicable federal civil rights laws and [place] laws. We do not discriminate on the basis of race, color, national origin, age, disability, sex, sexual orientation, or gender identity.            Thank you!     Thank you for choosing [facility]  for your care. Our goal is always to provide you with excellent care. Hearing back from our patients is one way we can continue to improve our services. Please take a few minutes to complete the written survey that you may receive in the mail after your visit with us. Thank you!             Your Updated Medication List - Protect others around you: Learn how to safely use, store and throw away your medicines at www.disposemymeds.org.          This list is accurate as of 5/14/18  8:46 AM.  Always use your most recent med list.                   Brand Name Dispense Instructions for use Diagnosis    ascorbic acid 250 MG tablet    VITAMIN C     1 TABLET 3 TIMES DAILY        FLUCELVAX QUADRIVALENT 0.5  ML Yaa   Generic drug:  Influenza Vac Subunit Quad      TO BE ADMINISTERED BY PHARMACIST FOR IMMUNIZATION        levothyroxine 150 MCG tablet    SYNTHROID/LEVOTHROID    90 tablet    Take 1 tablet (150 mcg) by mouth daily    Acquired hypothyroidism       methylPREDNISolone acetate 40 MG/ML injection    DEPO-MEDROL    1 mL    1 mL (40 mg) by INTRA-ARTICULAR route once for 1 dose    Trigger middle finger of left hand       VIT CALCIUM CITRATE + D TABS 250-62.5 MG-IU OR

## 2018-05-14 NOTE — LETTER
5/14/2018         RE: Lety Baldwin  [address]  [place] [address]        Dear Colleague,    Thank you for referring your patient, Lety Baldwin, to the [facility]. Please see a copy of my visit note below.    [facility]   Clinic Visit s May 14, 2018    PCP: Ernestina Figueroa      Lety is a 62 year old female who is seen in consultation at the request of Dr. Figueroa for   Chief Complaint   Patient presents with     Musculoskeletal Problem     left middle finger pain       Injury: Patient reports insidious onset without acute precipitating event. She likes to sofia.      right-hand dominant    Location of Pain: left middle finger/A1 pulley  Duration of Pain: 2-3 months   Rating of Pain at worst: 4/10  Rating of Pain Currently: 2/10  Pain is better with: splint  Pain is worse with: crocheting,    Treatment so far consists of: splint, rest,   Associated symptoms: catching, locking  Recent imaging completed: No recent imaging completed.  Prior History of related problems: none    Social History:      Past Medical History:   Diagnosis Date     Disorder of bone and cartilage, unspecified 2004    -2.0 in 2007     Fracture of metatarsal bone of right foot 5/15     Malignant neoplasm of breast (female), unspecified site 8/97    Ductal Carcinoma in situ right     Other specified acquired hypothyroidism      PONV (postoperative nausea and vomiting)        Patient Active Problem List    Diagnosis Date Noted     Choledocholithiasis 01/18/2018     Priority: Medium     Advanced directives, counseling/discussion 09/28/2015     Priority: Medium     Information given to pt to take home and review. 9/28/15       CARDIOVASCULAR SCREENING; LDL GOAL LESS THAN 160 10/31/2010     Priority: Medium     Disorder of bone and cartilage      Priority: Medium     -2.0 in 2007  Problem list name updated by automated process. Provider to review       Breast cancer (H)  08/05/2003     Priority: Medium     Acquired hypothyroidism 08/05/2003     Priority: Medium     Problem list name updated by automated process. Provider to review         Family History   Problem Relation Age of Onset     DIABETES Father      Hypertension Mother      Alzheimer Disease Mother      Lipids Mother      Cancer - colorectal Mother      Colon Cancer Mother 78     Arthritis Maternal Grandmother      C.A.D. Maternal Grandmother      Cancer - colorectal Maternal Grandfather      Alzheimer Disease Maternal Grandfather      CEREBROVASCULAR DISEASE Paternal Grandmother        Social History     Social History     Marital status:      Spouse name: N/A     Number of children: 2     Years of education: N/A     Occupational History           Social History Main Topics     Smoking status: Never Smoker     Smokeless tobacco: Never Used     Alcohol use No       Past Surgical History:   Procedure Laterality Date     BREAST SURGERY  01/18/2018    bilat breast implant exchange     C NONSPECIFIC PROCEDURE  9/00/97    Right Total Mastectomy- Reconstructive Surgery. Abstracted 5/15/02.     CHOLECYSTECTOMY       COLONOSCOPY N/A 10/8/2014    Procedure: COLONOSCOPY;  Surgeon: Jelani Moran MD;  Location: [facility]     ENDOSCOPIC RETROGRADE CHOLANGIOPANCREATOGRAM N/A 1/19/2018    Procedure: COMBINED ENDOSCOPIC RETROGRADE CHOLANGIOPANCREATOGRAPHY, SPHINCTEROTOMY;  ENDOSCOPIC RETROGRADE CHOLANGIOPANCREATOGRAM (ERCP), SPHINCTEROTOMY, STONE EXTRACTION;  Surgeon: Patsy Mcclure MD;  Location: [facility] OR     ENDOSCOPIC RETROGRADE CHOLANGIOPANCREATOGRAM N/A 1/19/2018    Procedure: COMBINED ENDOSCOPIC RETROGRADE CHOLANGIOPANCREATOGRAPHY, REMOVE STONE/BALLOON;;  Surgeon: Patsy Mcclure MD;  Location: [facility] OR     HC BIOPSY OF BREAST, OPEN INCISIONAL  1992    Rt     HC DILATION/CURETTAGE DIAG/THER NON OB  1995    D & C     LAPAROSCOPIC CHOLECYSTECTOMY WITH CHOLANGIOGRAMS N/A 1/18/2018    Procedure:  "LAPAROSCOPIC CHOLECYSTECTOMY WITH CHOLANGIOGRAMS;;  Surgeon: Peter Winslow MD;  Location: [facility]     RECONSTRUCT BREAST BILATERAL, IMPLANT PROSTHESIS BILATERAL, COMBINED Bilateral 1/18/2018    Procedure: COMBINED RECONSTRUCT BREAST BILATERAL, IMPLANT PROSTHESIS BILATERAL;  BILATERAL BREAST RECONSTRUCTION WITH EXCHANGE OF GEL IMPLANTS AND CAPSULOTOMIES, LAPAROSCOPIC CHOLECYSTECTOMY WITH CHOLANGIOGRAMS;  Surgeon: Derrell Diaz MD;  Location: [facility]             Review of Systems   Musculoskeletal: Positive for joint pain.   All other systems reviewed and are negative.        Physical Exam  Ht 5' 7\" (1.702 m)  Wt 150 lb (68 kg)  LMP 01/01/2004  BMI 23.49 kg/m2  Constitutional:well-developed, well-nourished, and in no distress.   Cardiovascular: Intact distal pulses.    Neurological: alert. Gait Normal:   Gait, station, stance, and balance appear normal for age  Skin: Skin is warm and dry.   Psychiatric: Mood and affect normal.   Respiratory: unlabored, speaks in full sentences  Lymph: no LAD, no lymphangitis          Left Hand/Wrist Exam   Sensation: Normal    Tenderness   The patient is experiencing tenderness in the palmar aspect of middle finger MCP.    Range of Motion   Wrist  Pronation:  Normal  Supination: Normal  Flexion:       Normal  Extension:  Normal  Hand  DIP Thumb: Normal  DIP Index:    Normal  DIP Middle:  Normal  DIP Ring:     Normal  DIP Little:     Normal  MP Thumb:  Normal  MP Index:     Normal  MP Middle:   Normal  MP Ring:      Normal  MP Little:      Normal  PIP Index:     Normal  PIP Middle:   Normal  PIP Ring:      Normal  PIP Little:      Normal    Muscle Strength   Wrist Extension:   5/5  Wrist Flexion:       5/5  :                      5/5    Tests   Phalen s Sign: n/t  Tinel s Sign (Medial Nerve): n/t  Finkelstein: Negative          Assessment:    ICD-10-CM    1. Trigger middle finger of left hand M65.332 METHYLPREDNISOLONE 40 MG INJ     methylPREDNISolone acetate (DEPO-MEDROL) 40 " MG/ML injection     INJECTION SINGLE TENDON SHEATH/LIGAMENT     [facility] PT, HAND, AND CHIROPRACTIC REFERRAL       nature of trigger finger pathology discussed. Reviewed options including cortisone injections, hand therapy, or surgical management.  Risks, benefits and complications of trigger finger injection with cortisone discussed with the patient. Common cosmetic effects associated with cortisone noted such as skin hypopigmentation and fragility and subcutaneous atrophy.  Generalized systemic effects of cortisone noted as well, including but not limited to anxiety, agitation, hypertension, hyperglycemia, bone density loss.  She elected to proceed.  Using sterile technique, the area was first prepped with Chloroprep.  A 25 gauge, 1 1/2 inch needle was used to inject 20 mg depomedrol and 1/2cc 1% lidocaine injected at the tendon sheath using the tendon nodule as a landmark. Pt tolerated will. Dressed with a bandaid. Discussed s/s of common adverse effects.  Await 1 week to determine full effect of injection. If recurrent could reinject, try hand therapy, or see hand surgery.    She desired to proceed with hand therapy also.         Again, thank you for allowing me to participate in the care of your patient.        Sincerely,        Doug Ariza MD

## 2018-06-01 ENCOUNTER — THERAPY VISIT (OUTPATIENT)
Dept: OCCUPATIONAL THERAPY | Facility: CLINIC | Age: 63
End: 2018-06-01
Payer: COMMERCIAL

## 2018-06-01 DIAGNOSIS — M79.645 PAIN OF FINGER OF LEFT HAND: ICD-10-CM

## 2018-06-01 DIAGNOSIS — M65.332 TRIGGER MIDDLE FINGER OF LEFT HAND: ICD-10-CM

## 2018-06-01 PROCEDURE — 97112 NEUROMUSCULAR REEDUCATION: CPT | Mod: GO | Performed by: OCCUPATIONAL THERAPIST

## 2018-06-01 PROCEDURE — 97165 OT EVAL LOW COMPLEX 30 MIN: CPT | Mod: GO | Performed by: OCCUPATIONAL THERAPIST

## 2018-06-01 PROCEDURE — 97140 MANUAL THERAPY 1/> REGIONS: CPT | Mod: GO | Performed by: OCCUPATIONAL THERAPIST

## 2018-06-01 PROCEDURE — 97110 THERAPEUTIC EXERCISES: CPT | Mod: GO | Performed by: OCCUPATIONAL THERAPIST

## 2018-06-01 NOTE — PROGRESS NOTES
Hand Therapy Initial Evaluation  Current Date:  6/1/2018    Subjective:  Lety Baldwin is a 62 year old rigth hand dominant female.    Diagnosis: L long trigger finger  DOI:  A few months ago (MD order date 5/14/18)    Patient reports symptoms of pain, stiffness/loss of motion, weakness/loss of strength and locking of the left long finger which occurred due to gradual onset dur to repetitive use with crocheting. Since onset symptoms are gradually getting better. Special tests:  none.  Previous treatment: cortisone injection on 5/14/18, finger splint at night to hold finger straight, . General health as reported by patient is excellent.  Pertinent medical history includes: Cancer, Thyroid Problems.  Medical allergies: codeine.  Surgical history: cancer: 1997 breast cancer, other: Jan 2018 gall bladder excision.  Medication history: Thyroid.    Occupational Profile Information:  Current occupation is   Currently working in normal job without restrictions  Job Tasks: Computer Work, Repetitive Tasks  Prior functional level:  no limitations  Barriers include:none  Mobility: No difficulty  Transportation: drives  Leisure activities/hobbies: crocheting    Upper Extremity Functional Index Score:  SCORE:   Column Totals: /80: 77   (A lower score indicates greater disability.)    O:  Pain Level Report: On scale 0-10/10  Date 6/1/2018    Side L    At Rest 0    With Activity 3      Primary Report: location and description  Date 6/1/2018    Side L    Location A1 pulley of long finger    Radiation none    Pain Quality Dull    Frequency Intermittent    Duration At night/morning    Exacerbated by  sleeping without brace    Relieved by brace    Progression since onset Gradually improving      Range of Motion Finger AROM (PROM): WNL    Tenderness: pain scale of 0-10/10  Date 6/1/2018    Side L    Volar MP 4    PIP 2    CMC 0      Stage of Stenosing Tenosynovitis (SST):     6/1/2018   Triggering of left long finger 3    Stage 1:  Normal  Stage 2:  Uneven motion of tendon  Stage 3:  Triggering, clicking, catching  Stage 4:  Locking in extension or flexion; unlocked by active motion  Stage 5:  Locking in extension or flexion; unlocked by passive motion  Stage 6:  Finger locked in extension or flexion    STRENGTH: (Measured in pounds, pain scale 0-10/10)    Date 6/1/2018        Trials Left Right Left Right Left Right Left Right Left Right Left Right   1 47 51             2               3               Avg               Pain                 3 Point Pinch  Date 6/1/2018        Trials Left Right Left Right Left Right Left Right Left Right Left Right   1 12 14             2               3               Avg               Pain                 Lateral Pinch  Date 6/1/2018        Trials Left Right Left Right Left Right Left Right Left Right Left Right   1 14 16             2               3               Avg               Pain                 Assessment:  Patient presents with symptoms consistent with diagnosis of left long trigger finger,  with conservative intervention.     Patient's limitations or Problem List includes:  Pain, Decreased ROM/motion, Weakness, Decreased  and pinch and Tightness in musculature of the left long finger which interferes with the patient's ability to perform Recreational Activities and Household Chores as compared to previous level of function.    Rehab Potential:  Excellent - Return to full activity, no limitations    Patient will benefit from skilled Occupational Therapy to increase ROM, flexibility,  strength and pinch strength and decrease pain to return to previous activity level and resume normal daily tasks and to reach their rehab potential.    Barriers to Learning:  No barrier    Communication Issues:  Patient appears to be able to clearly communicate and understand verbal and written communication and follow directions correctly.    Chart Review: Chart Review, Brief history including  review of medical and/or therapy records relating to the presenting problem and Simple history review with patient    Assessment of Occupational Performance:  1-3 Performance Deficits  Identified Performance Deficits: home establishment and management and leisure activities      Clinical Decision Making (Complexity): Low complexity    Treatment Explanation:  The following has been discussed with the patient:    RX ordered/plan of care  Anticipated outcomes  Possible risks and side effects    P: Frequency:  1 X week, once daily  Duration:  for 6 weeks    Treatment Plan:    Modalities:  US   Therapeutic Exercise:   AROM of fingers,  Tendon gliding with specific stretch to the intrinsics  PROM with stretch to wrist extensors and flexors,  Neuro:  K-tape to the extensors  Manual Techniques:   Myofascial release of the forearm extensors and flexors and intrinsics  CMC mobilization of the affected finger  Orthotics:  MCP block, static extension    Home Program:   Exercise:   AROM of fingers  PROM with stretch to wrist extensors and flexors  Orthosis:   MCP block as much as possible. Static extension at night    Discharge Plan:    Achieve all LTG.  Independent in home treatment program.  Reach maximal therapeutic benefit.    Next Visit:  MFR  PROM in hook and composite flexion  Check fit of orthosis

## 2018-06-08 ENCOUNTER — THERAPY VISIT (OUTPATIENT)
Dept: OCCUPATIONAL THERAPY | Facility: CLINIC | Age: 63
End: 2018-06-08
Payer: COMMERCIAL

## 2018-06-08 DIAGNOSIS — M65.332 TRIGGER MIDDLE FINGER OF LEFT HAND: ICD-10-CM

## 2018-06-08 DIAGNOSIS — M79.645 PAIN OF FINGER OF LEFT HAND: ICD-10-CM

## 2018-06-08 PROCEDURE — 97110 THERAPEUTIC EXERCISES: CPT | Mod: GO | Performed by: OCCUPATIONAL THERAPIST

## 2018-06-08 PROCEDURE — 97140 MANUAL THERAPY 1/> REGIONS: CPT | Mod: GO | Performed by: OCCUPATIONAL THERAPIST

## 2018-06-11 PROBLEM — M79.645 PAIN OF FINGER OF LEFT HAND: Status: ACTIVE | Noted: 2018-06-11

## 2018-06-11 PROBLEM — M65.332 TRIGGER MIDDLE FINGER OF LEFT HAND: Status: ACTIVE | Noted: 2018-06-11

## 2018-06-22 ENCOUNTER — THERAPY VISIT (OUTPATIENT)
Dept: OCCUPATIONAL THERAPY | Facility: CLINIC | Age: 63
End: 2018-06-22
Payer: COMMERCIAL

## 2018-06-22 DIAGNOSIS — M79.645 PAIN OF FINGER OF LEFT HAND: ICD-10-CM

## 2018-06-22 DIAGNOSIS — M65.332 TRIGGER MIDDLE FINGER OF LEFT HAND: ICD-10-CM

## 2018-06-22 PROCEDURE — 97140 MANUAL THERAPY 1/> REGIONS: CPT | Mod: GO | Performed by: OCCUPATIONAL THERAPIST

## 2018-06-22 PROCEDURE — 97110 THERAPEUTIC EXERCISES: CPT | Mod: GO | Performed by: OCCUPATIONAL THERAPIST

## 2018-07-12 ENCOUNTER — TELEPHONE (OUTPATIENT)
Dept: ORTHOPEDICS | Facility: CLINIC | Age: 63
End: 2018-07-12

## 2018-07-12 ENCOUNTER — THERAPY VISIT (OUTPATIENT)
Dept: OCCUPATIONAL THERAPY | Facility: CLINIC | Age: 63
End: 2018-07-12
Payer: COMMERCIAL

## 2018-07-12 DIAGNOSIS — M65.331 TRIGGER MIDDLE FINGER OF RIGHT HAND: Primary | ICD-10-CM

## 2018-07-12 DIAGNOSIS — M65.332 TRIGGER MIDDLE FINGER OF LEFT HAND: ICD-10-CM

## 2018-07-12 DIAGNOSIS — M79.645 PAIN OF FINGER OF LEFT HAND: ICD-10-CM

## 2018-07-12 PROCEDURE — 97140 MANUAL THERAPY 1/> REGIONS: CPT | Mod: GO | Performed by: OCCUPATIONAL THERAPIST

## 2018-07-12 PROCEDURE — 97110 THERAPEUTIC EXERCISES: CPT | Mod: GO | Performed by: OCCUPATIONAL THERAPIST

## 2018-07-12 NOTE — TELEPHONE ENCOUNTER
Lety Baldwin is a 62 year old female who left voicemail stating she previously saw Dr. Ariza for trigger finger on left hand and is seeing Colleen in hand therapy currently. She has now started to have problems with a finger on her right hand and would like an order sent to physical therapy for that if possible.     Phone call to patient. She reports same issue with the middle finger on her right hand. She states she has been clenching both hands at night while sleeping.  Colleen did make a custom splint for her left hand, and she states her left hand is 100% better.   However, in order to get a splint for the right hand she would need a new order for hand therapy.     Patient expecting call back: YES      Preferred contact number:  [phone] (home) [phone] (work)   Can we leave a detailed message on this number: YES     Please see order pending and advise if appropriate or advise if patient needs to be seen.     ROMY Fink RN

## 2018-07-18 ENCOUNTER — THERAPY VISIT (OUTPATIENT)
Dept: OCCUPATIONAL THERAPY | Facility: CLINIC | Age: 63
End: 2018-07-18
Payer: COMMERCIAL

## 2018-07-18 DIAGNOSIS — M65.331 TRIGGER MIDDLE FINGER OF RIGHT HAND: ICD-10-CM

## 2018-07-18 DIAGNOSIS — M79.644 PAIN OF FINGER OF RIGHT HAND: ICD-10-CM

## 2018-07-18 PROCEDURE — 97760 ORTHOTIC MGMT&TRAING 1ST ENC: CPT | Mod: GO | Performed by: OCCUPATIONAL THERAPIST

## 2018-07-18 NOTE — PROGRESS NOTES
Hand Therapy Initial Evaluation  Current Date:  7/18/2018    Subjective:  Lety Baldwin is a 63 year old rigth hand dominant female.    Diagnosis: R long trigger finger  DOI:  A few weeks ago (MD order date 7/12/18)    Patient reports symptoms of pain, stiffness/loss of motion, weakness/loss of strength and catching of the right long finger which occurred due to gradual onset due to repetitive use with crocheting. Since onset symptoms are gradually worsening. Special tests:  none.  Previous treatment: none . General health as reported by patient is excellent.  Pertinent medical history includes: Cancer, Thyroid Problems.  Medical allergies: codeine.  Surgical history: cancer: 1997 breast cancer, other: Jan 2018 gall bladder excision.  Medication history: Thyroid.    Occupational Profile Information:  Current occupation is   Currently working in normal job without restrictions  Job Tasks: Computer Work, Repetitive Tasks  Prior functional level:  no limitations  Barriers include:none  Mobility: No difficulty  Transportation: drives  Leisure activities/hobbies: crocheting    Upper Extremity Functional Index Score:  SCORE:   Column Totals: /80: 79   (A lower score indicates greater disability.)      Objective:  Pain Level Report: On scale 0-10/10  Date 7/18/2018    Side R    Overall 2    At Rest 0    With Activity 2      Primary Report: location and description  Date 7/18/2018    Side R    Location Long finger A1 pulley    Radiation none    Pain Quality achy    Frequency intermittent    Duration Worse when sleeping    Exacerbated by  sleeping    Relieved by nothing    Progression since onset Gradually worsening      Edema:  none    ROM:  WNL  Trigger:  Very slight with palpation today, pt reports it has locked 1 time  Assessment:  Patient presents with symptoms consistent with diagnosis of Right long finger trigger, with conservative intervention.     Patient's limitations or Problem List includes:  Pain of  the right long finger which interferes with the patient's ability to perform Recreational Activities as compared to previous level of function.    Rehab Potential:  Good - Return to full activity, some limitations    Patient will benefit from skilled Occupational Therapy to increase flexibility and decrease pain to return to previous activity level and resume normal daily tasks and to reach their rehab potential.    Barriers to Learning:  No barrier    Communication Issues:  Patient appears to be able to clearly communicate and understand verbal and written communication and follow directions correctly.    Assessment of Occupational Performance:  1-3 Performance Deficits  Identified Performance Deficits: leisure activities      Clinical Decision Making (Complexity): Low complexity  Treatment Explanation:  The following has been discussed with the patient:  RX ordered/plan of care  Anticipated outcomes  Possible risks and side effects    P: Frequency:  1x visit, once daily.  Pt to return for splint adjustments, otherwise cont HEP independently. D/C [facility]    Treatment Plan:    Orthotic Fabrication:     Static orthosis and Finger based orthosis     Home Program:   Orthotic Fabrication:   Static orthosis and Finger based orthosis     Discharge Plan:  Achieve all LTG.  Independent in home treatment program.  Reach maximal therapeutic benefit.

## 2018-07-18 NOTE — PROGRESS NOTES
Hand Therapy Discharge Note  Current Date: 7/12/18  Reporting Period:  6/1/2018 to 7/122018    Subjective:  Lety Baldwin is a 62 year old rigth hand dominant female.    Diagnosis: L long trigger finger  DOI:  A few months ago (MD order date 5/14/18)    S:  Subjective changes as noted by patient: Fantastic!  No glitching, no problem.    Functional changes noted by patient: no difficulty     Response to previous treatment:  good  Patient has noted adverse reaction to:   None      O:  Pain Level Report: On scale 0-10/10  Date 6/1/2018 7/12/18   Side L L   At Rest 0 0   With Activity 3 0     Primary Report: location and description  Date 6/1/2018    Side L    Location A1 pulley of long finger    Radiation none    Pain Quality Dull    Frequency Intermittent    Duration At night/morning    Exacerbated by  sleeping without brace    Relieved by brace    Progression since onset Gradually improving      Range of Motion Finger AROM (PROM): WNL    Tenderness: pain scale of 0-10/10  Date 6/1/2018 7/12/18   Side L L   Volar MP 4 0   PIP 2 0   CMC 0 0     Stage of Stenosing Tenosynovitis (SST):     6/1/2018 7/12/18   Triggering of left long finger 3 1   Stage 1:  Normal  Stage 2:  Uneven motion of tendon  Stage 3:  Triggering, clicking, catching  Stage 4:  Locking in extension or flexion; unlocked by active motion  Stage 5:  Locking in extension or flexion; unlocked by passive motion  Stage 6:  Finger locked in extension or flexion    STRENGTH: (Measured in pounds, pain scale 0-10/10)    Date 6/1/2018        Trials Left Right Left Right Left Right Left Right Left Right Left Right   1 47 51             2               3               Avg               Pain                 3 Point Pinch  Date 6/1/2018        Trials Left Right Left Right Left Right Left Right Left Right Left Right   1 12 14             2               3               Avg               Pain                 Lateral Pinch  Date 6/1/2018        Trials Left Right  Left Right Left Right Left Right Left Right Left Right   1 14 16             2               3               Avg               Pain                 A: Patient's symptoms are resolving.  Patient is progressing well.  Patient is independent in home exercise program.  Patient has met Short and Long Term Treatment Goals.  Patient is ready to be discharged from therapy and continue their home treatment program.    P:  Discharge from Hand Therapy; continue home program.  Patient to contact MD for tx orders should further issues arise.    Home Program:   Exercise:   AROM of fingers  PROM with stretch to wrist extensors and flexors  Orthosis:   MCP block as much as possible. Static extension at night    Discharge Plan:    Achieve all LTG.  Independent in home treatment program.  Reach maximal therapeutic benefit.

## 2018-08-27 ENCOUNTER — OFFICE VISIT (OUTPATIENT)
Dept: INTERNAL MEDICINE | Facility: CLINIC | Age: 63
End: 2018-08-27
Payer: COMMERCIAL

## 2018-08-27 VITALS
RESPIRATION RATE: 16 BRPM | OXYGEN SATURATION: 98 % | WEIGHT: 157 LBS | BODY MASS INDEX: 24.64 KG/M2 | TEMPERATURE: 98 F | HEART RATE: 87 BPM | HEIGHT: 67 IN

## 2018-08-27 DIAGNOSIS — M79.18 LEFT BUTTOCK PAIN: Primary | ICD-10-CM

## 2018-08-27 PROCEDURE — 99214 OFFICE O/P EST MOD 30 MIN: CPT | Performed by: INTERNAL MEDICINE

## 2018-08-27 NOTE — MR AVS SNAPSHOT
After Visit Summary   8/27/2018    Lety Baldwin    MRN: [id number]           Patient Information     Date Of Birth          1955        Visit Information        Provider Department      8/27/2018 2:20 PM Ernestina Figueroa MD [facility]        Today's Diagnoses     Left buttock pain    -  1      Care Instructions            Piriformis Syndrome Exercises            You may do all of these exercises right away.  Gluteal stretch: Lie on your back with both knees bent. Rest the ankle of your injured leg over the knee of your other leg. Grasp the thigh of the leg on the uninjured side and pull toward your chest. You will feel a stretch along the buttocks on the injured side and possibly along the outside of your hip. Hold the stretch for 15 to 30 seconds. Repeat 3 times.   Standing hamstring stretch: Put the heel of the leg on your injured side on a stool about 15 inches high. Keep your leg straight. Lean forward, bending at the hips, until you feel a mild stretch in the back of your thigh. Make sure you don't roll your shoulders or bend at the waist when doing this or you will stretch your lower back instead of your leg. Hold the stretch for 15 to 30 seconds. Repeat 3 times.   Resisted hip abduction: Stand sideways near a door with your injured side further from the door. Tie elastic tubing around the ankle on your injured side. Knot the other end of the tubing and close the knot in the door near the floor. Pull the tubing out to the side, keeping your leg straight. Return to the starting position. Do 2 sets of 15. For more resistance, move farther away from the door.   Partial curl: Lie on your back with your knees bent and your feet flat on the floor. Tighten your stomach muscles. Tuck your chin to your chest. With your hands stretched out in front of you, curl your upper body forward until your shoulders clear the floor. Hold this position for 3 seconds. Don't hold your breath. It  helps to breathe out as you lift your shoulders up. Relax back to the floor. Repeat 10 times. Build to 2 sets of 15. To challenge yourself, clasp your hands behind your head and keep your elbows out to the side.   Prone hip extension (bent leg): Lie on your stomach with a pillow underneath your hips. Bend the knee on your injured side. Squeeze your buttocks muscles and lift your leg off the floor about 6 inches. Keep your other leg straight. Hold for 5 seconds. Then lower your leg and relax. Do 2 sets of 15.Repeat this exercise for the other leg.   Side-lying leg lift (cross over): Lie on your injured side with your top leg bent and that foot placed in front of the bottom leg. Keep your bottom leg straight. Raise your injured leg as far as you can and hold it for 5 seconds. Keep your hips still while you lift your leg. Hold this position for 5 seconds and then slowly lower your leg. Do 2 sets of 15.  Published by [employer].  This content is reviewed periodically and is subject to change as new health information becomes available. The information is intended to inform and educate and is not a replacement for medical evaluation, advice, diagnosis or treatment by a healthcare professional.  Written by Violeta Reynolds, MS, PT, and Faviola Mustafa PT, [facility], [facility], for [employer].    2011 [facility] and/or its affiliates. All rights reserved.         Copyright   [employer] 2011  Adult Health Advisor                      Follow-ups after your visit        Additional Services     [facility] PT, HAND, AND CHIROPRACTIC REFERRAL       **This order will print in the [facility] Scheduling Office**    Physical Therapy, Hand Therapy and Chiropractic Care are available through:    *[facility]  *[facility]  *[facility]    Call one number to schedule at any of the above locations: [phone].    Your provider has referred you to: Physical Therapy at [facility] or  [facility]    Indication/Reason for Referral: Low Back Pain  Onset of Illness: 3 weeks  Therapy Orders: Evaluate and Treat  Special Programs: Walk in Spine  Special Request: None    Shawn Escobar      Additional Comments for the Therapist or Chiropractor: none      Please be aware that coverage of these services is subject to the terms and limitations of your health insurance plan.  Call member services at your health plan with any benefit or coverage questions.      Please bring the following to your appointment:    *Your personal calendar for scheduling future appointments  *Comfortable clothing                  Who to contact     If you have questions or need follow up information about today's clinic visit or your schedule please contact [facility] directly at [phone].  Normal or non-critical lab and imaging results will be communicated to you by MyChart, letter or phone within 4 business days after the clinic has received the results. If you do not hear from us within 7 days, please contact the clinic through [employer] or phone. If you have a critical or abnormal lab result, we will notify you by phone as soon as possible.  Submit refill requests through [employer] or call your pharmacy and they will forward the refill request to us. Please allow 3 business days for your refill to be completed.          Additional Information About Your Visit        [employer] Information     [employer] gives you secure access to your electronic health record. If you see a primary care provider, you can also send messages to your care team and make appointments. If you have questions, please call your primary care clinic.  If you do not have a primary care provider, please call [phone] and they will assist you.        Care EveryWhere ID     This is your Care EveryWhere ID. This could be used by other organizations to access your [place] medical records  [id number]        Your Vitals Were     Pulse Temperature  "Respirations Height Last Period Pulse Oximetry    87 98  F (36.7  C) (Oral) 16 5' 6.75\" (1.695 m) 01/01/2004 98%    BMI (Body Mass Index)                   24.77 kg/m2            Blood Pressure from Last 3 Encounters:   05/14/18 112/68   01/25/18 110/64   01/20/18 103/60    Weight from Last 3 Encounters:   08/27/18 157 lb (71.2 kg)   05/14/18 150 lb (68 kg)   01/25/18 151 lb (68.5 kg)              We Performed the Following     [facility] PT, HAND, AND CHIROPRACTIC REFERRAL          Today's Medication Changes          These changes are accurate as of 8/27/18  3:01 PM.  If you have any questions, ask your nurse or doctor.               These medicines have changed or have updated prescriptions.        Dose/Directions    ascorbic acid 250 MG tablet   Commonly known as:  VITAMIN C   This may have changed:  See the new instructions.        1 TABLET 3 TIMES DAILY   Refills:  0       VIT CALCIUM CITRATE + D TABS 250-62.5 MG-IU OR   This may have changed:  See the new instructions.        Refills:  0                Primary Care Provider Office Phone # Fax #    Ernestina Figueroa [phone]       [address] [place] [id number]        Equal Access to Services     MELODY RICHARDSON : Hadii lorena mikeo Soomaali, waaxda luqadaha, qaybta kaalmada adeegyada, jessica bailey. So [facility] [phone].    ATENCIÓN: Si habla español, tiene a ibrahim disposición servicios gratuitos de asistencia lingüística. Llame al [phone].    We comply with applicable federal civil rights laws and [place] laws. We do not discriminate on the basis of race, color, national origin, age, disability, sex, sexual orientation, or gender identity.            Thank you!     Thank you for choosing [facility]  for your care. Our goal is always to provide you with excellent care. Hearing back from our patients is one way we can continue to improve our services. Please take a few minutes to complete the " written survey that you may receive in the mail after your visit with us. Thank you!             Your Updated Medication List - Protect others around you: Learn how to safely use, store and throw away your medicines at www.disposemymeds.org.          This list is accurate as of 8/27/18  3:01 PM.  Always use your most recent med list.                   Brand Name Dispense Instructions for use Diagnosis    ascorbic acid 250 MG tablet    VITAMIN C     1 TABLET 3 TIMES DAILY        FLUCELVAX QUADRIVALENT 0.5 ML Yaa   Generic drug:  Influenza Vac Subunit Quad      TO BE ADMINISTERED BY PHARMACIST FOR IMMUNIZATION        levothyroxine 150 MCG tablet    SYNTHROID/LEVOTHROID    90 tablet    Take 1 tablet (150 mcg) by mouth daily    Acquired hypothyroidism       VIT CALCIUM CITRATE + D TABS 250-62.5 MG-IU OR

## 2018-08-27 NOTE — PROGRESS NOTES
"  SUBJECTIVE:                                                    Lety Baldwin is a 63 year old female who presents to clinic today for the following health issues:      Complaints of pain in the left buttock: This started bothering her recently, awakening 3 times last night with pain, it is constant aching but can be burning at times in the buttock, a little low back.  Sometimes it radiates into the thigh.  It bothers her with lifting, standing for a while and at night.  It is mild with sitting.  She has had some previous work injuries to her back in the past, had been treated at an outside clinic.   Aleve seems to help the pain.  No acute injury.  No numbness, tingling in the leg.        Patient Active Problem List   Diagnosis     Breast cancer (H)     Acquired hypothyroidism     Disorder of bone and cartilage     CARDIOVASCULAR SCREENING; LDL GOAL LESS THAN 160     Advanced directives, counseling/discussion     Choledocholithiasis     Pain of finger of left hand     Trigger middle finger of left hand     Pain of finger of right hand     Trigger middle finger of right hand     Current Outpatient Prescriptions   Medication Sig Dispense Refill     levothyroxine (SYNTHROID/LEVOTHROID) 150 MCG tablet Take 1 tablet (150 mcg) by mouth daily 90 tablet 3     VIT CALCIUM CITRATE + D TABS 250-62.5 MG-IU OR  (Patient taking differently: 1 tablet daily)       VITAMIN C 250 MG OR TABS 1 TABLET 3 TIMES DAILY (Patient taking differently: 1 TABLET  DAILY)       FLUCELVAX QUADRIVALENT 0.5 ML MIRIAN TO BE ADMINISTERED BY PHARMACIST FOR IMMUNIZATION  0      Social History   Substance Use Topics     Smoking status: Never Smoker     Smokeless tobacco: Never Used     Alcohol use No        ROS:  No fever, chills, numbness, tingling or weakness, no loss of control of bowel or bladder, no gait changes, no abdominal pain, no hematuria.      OBJECTIVE:     Pulse 87  Temp 98  F (36.7  C) (Oral)  Resp 16  Ht 5' 6.75\" (1.695 m)  Wt 157 lb " (71.2 kg)  [facility] 01/01/2004  SpO2 98%  BMI 24.77 kg/m2  Body mass index is 24.77 kg/(m^2).    Back: No significant spinal tenderness, mild tenderness in the upper gluteal, lower lumbar muscles with fairly moderate to significant tenderness in the left piriformis muscle.  Milder tenderness at the greater trochanter  Range of motion of the back is fairly preserved  No pain with range of motion at the hip joint  Negative straight leg raising  DTRs 2/4  Strength 5/5  Gait normal      ASSESSMENT/PLAN:             1. Left buttock pain  This is more likely piriformis syndrome rather than a disc herniation, advised on some stretches, given some handouts recommend referral to physical therapy.  Can use heat, continue Aleve as needed.  If progressive symptoms, new neurologic symptoms she should call and would consider imaging.  - [facility] PT, HAND, AND CHIROPRACTIC REFERRAL        Ernestina Figueroa MD  [facility]

## 2018-08-27 NOTE — PATIENT INSTRUCTIONS
Piriformis Syndrome Exercises            You may do all of these exercises right away.  Gluteal stretch: Lie on your back with both knees bent. Rest the ankle of your injured leg over the knee of your other leg. Grasp the thigh of the leg on the uninjured side and pull toward your chest. You will feel a stretch along the buttocks on the injured side and possibly along the outside of your hip. Hold the stretch for 15 to 30 seconds. Repeat 3 times.   Standing hamstring stretch: Put the heel of the leg on your injured side on a stool about 15 inches high. Keep your leg straight. Lean forward, bending at the hips, until you feel a mild stretch in the back of your thigh. Make sure you don't roll your shoulders or bend at the waist when doing this or you will stretch your lower back instead of your leg. Hold the stretch for 15 to 30 seconds. Repeat 3 times.   Resisted hip abduction: Stand sideways near a door with your injured side further from the door. Tie elastic tubing around the ankle on your injured side. Knot the other end of the tubing and close the knot in the door near the floor. Pull the tubing out to the side, keeping your leg straight. Return to the starting position. Do 2 sets of 15. For more resistance, move farther away from the door.   Partial curl: Lie on your back with your knees bent and your feet flat on the floor. Tighten your stomach muscles. Tuck your chin to your chest. With your hands stretched out in front of you, curl your upper body forward until your shoulders clear the floor. Hold this position for 3 seconds. Don't hold your breath. It helps to breathe out as you lift your shoulders up. Relax back to the floor. Repeat 10 times. Build to 2 sets of 15. To challenge yourself, clasp your hands behind your head and keep your elbows out to the side.   Prone hip extension (bent leg): Lie on your stomach with a pillow underneath your hips. Bend the knee on your injured side. Squeeze your  buttocks muscles and lift your leg off the floor about 6 inches. Keep your other leg straight. Hold for 5 seconds. Then lower your leg and relax. Do 2 sets of 15.Repeat this exercise for the other leg.   Side-lying leg lift (cross over): Lie on your injured side with your top leg bent and that foot placed in front of the bottom leg. Keep your bottom leg straight. Raise your injured leg as far as you can and hold it for 5 seconds. Keep your hips still while you lift your leg. Hold this position for 5 seconds and then slowly lower your leg. Do 2 sets of 15.  Published by [employer].  This content is reviewed periodically and is subject to change as new health information becomes available. The information is intended to inform and educate and is not a replacement for medical evaluation, advice, diagnosis or treatment by a healthcare professional.  Written by iVoleta Reynolds, MS, PT, and Faviola Mustafa PT, [facility], [facility], for [employer].    2011 [facility] and/or its affiliates. All rights reserved.         Copyright   [employer] 2011  Adult Health Advisor

## 2018-09-04 ENCOUNTER — THERAPY VISIT (OUTPATIENT)
Dept: PHYSICAL THERAPY | Facility: CLINIC | Age: 63
End: 2018-09-04
Payer: COMMERCIAL

## 2018-09-04 DIAGNOSIS — M54.50 LUMBAGO: Primary | ICD-10-CM

## 2018-09-04 PROCEDURE — 97161 PT EVAL LOW COMPLEX 20 MIN: CPT | Mod: GP | Performed by: PHYSICAL THERAPIST

## 2018-09-04 PROCEDURE — 97110 THERAPEUTIC EXERCISES: CPT | Mod: GP | Performed by: PHYSICAL THERAPIST

## 2018-09-04 NOTE — MR AVS SNAPSHOT
After Visit Summary   9/4/2018    Lety Baldwin    MRN: [id number]           Patient Information     Date Of Birth          1955        Visit Information        Provider Department      9/4/2018 8:40 AM Darren Darden PT [facility] [place]        Today's Diagnoses     Lumbago    -  1       Follow-ups after your visit        Your next 10 appointments already scheduled     Sep 12, 2018  8:10 AM CDT   MARIANO Extremity with Darren Darden PT   [facility] [place] ([facility] [place]  )    [address]  [address]  [place] [address]   [phone]            Sep 19, 2018  7:30 AM CDT   MARIANO Extremity with Darren Darden PT   [facility] [place] ([facility] [place]  )    [address] [place] [address]  [address]  [place] [address]   [phone]            Oct 04, 2018  8:20 AM CDT   PHYSICAL with Ernestina Figueroa MD   [facility]    [address]  [facility] [id number] [phone]            Oct 04, 2018 10:05 AM CDT   Screening Mammogram with RHBCMA1   [facility] ([facility])    [address] [facility], [address]  [facility] [address]   [phone]           Do NOT use body powder, lotions, perfume or deodorant the day of the exam.  If your last mammogram was not done at [place], please bring your mammogram films. We will need the name of your provider to send a copy of your report.  A mammogram may be covered on an annual or biannual basis, please check with your insurance company.              Who to contact     If you have questions or need follow up information about today's clinic visit or your schedule please contact [facility] directly at [phone].  Normal or non-critical lab and imaging results will be communicated to you by MyChart, letter or phone within 4 business days after the clinic has received the results. If you do not hear  from us within 7 days, please contact the clinic through [employer] or phone. If you have a critical or abnormal lab result, we will notify you by phone as soon as possible.  Submit refill requests through [employer] or call your pharmacy and they will forward the refill request to us. Please allow 3 business days for your refill to be completed.          Additional Information About Your Visit        [email or web address] Information     [employer] gives you secure access to your electronic health record. If you see a primary care provider, you can also send messages to your care team and make appointments. If you have questions, please call your primary care clinic.  If you do not have a primary care provider, please call [phone] and they will assist you.        Care EveryWhere ID     This is your Care EveryWhere ID. This could be used by other organizations to access your [facility] medical records  [id number]        Your Vitals Were     Last Period                   01/01/2004            Blood Pressure from Last 3 Encounters:   05/14/18 112/68   01/25/18 110/64   01/20/18 103/60    Weight from Last 3 Encounters:   08/27/18 71.2 kg (157 lb)   05/14/18 68 kg (150 lb)   01/25/18 68.5 kg (151 lb)              We Performed the Following     HC PT EVAL, LOW COMPLEXITY     [facility] INITIAL EVAL REPORT     THERAPEUTIC EXERCISES          Today's Medication Changes          These changes are accurate as of 9/4/18 10:02 AM.  If you have any questions, ask your nurse or doctor.               These medicines have changed or have updated prescriptions.        Dose/Directions    ascorbic acid 250 MG tablet   Commonly known as:  VITAMIN C   This may have changed:  See the new instructions.        1 TABLET 3 TIMES DAILY   Refills:  0       VIT CALCIUM CITRATE + D TABS 250-62.5 MG-IU OR   This may have changed:  See the new instructions.        Refills:  0                Primary Care Provider Office Phone # Fax #    Ernestina Figueroa [phone]  [phone]       [address] [facility] [address]  [facility] [address]        Equal Access to Services     MELODY RICHARDSON : Hadii aad ku yosi [place], waaxda luqadaha, qaybta kaalmada maria g, jessica yusufin hayaadee morochololita stephenson essie bailey. So [facility] [phone].    ATENCIÓN: Si habla español, tiene a ibrahim disposición servicios gratuitos de asistencia lingüística. Llame al [phone].    We comply with applicable federal civil rights laws and [place] laws. We do not discriminate on the basis of race, color, national origin, age, disability, sex, sexual orientation, or gender identity.            Thank you!     Thank you for choosing [facility]  for your care. Our goal is always to provide you with excellent care. Hearing back from our patients is one way we can continue to improve our services. Please take a few minutes to complete the written survey that you may receive in the mail after your visit with us. Thank you!             Your Updated Medication List - Protect others around you: Learn how to safely use, store and throw away your medicines at www.disposemymeds.org.          This list is accurate as of 9/4/18 10:02 AM.  Always use your most recent med list.                   Brand Name Dispense Instructions for use Diagnosis    ascorbic acid 250 MG tablet    VITAMIN C     1 TABLET 3 TIMES DAILY        FLUCELVAX QUADRIVALENT 0.5 ML Yaa   Generic drug:  Influenza Vac Subunit Quad      TO BE ADMINISTERED BY PHARMACIST FOR IMMUNIZATION        levothyroxine 150 MCG tablet    SYNTHROID/LEVOTHROID    90 tablet    Take 1 tablet (150 mcg) by mouth daily    Acquired hypothyroidism       VIT CALCIUM CITRATE + D TABS 250-62.5 MG-IU OR

## 2018-09-04 NOTE — PROGRESS NOTES
[facility] Initial Evaluation  Subjective:  Patient is a 63 year old female presenting with rehab back hpi.   Lety Baldwin is a 63 year old female with a lumbar condition.  Condition occurred with:  Insidious onset.  Condition occurred: for unknown reasons.  This is a new condition  Patient reports that she started to have left low back pain about 8/1/18.  Left low back pain, buttocks, left foot burning.    Waking her at night. .        Pain is described as burning, sharp, shooting and aching and is intermittent and reported as 4/10.   Pain is the same all the time.  Symptoms are exacerbated by lifting and sitting Relieved by: usually better standing, Aleve.  Since onset symptoms are unchanged.        General health as reported by patient is good.  Pertinent medical history includes:  Thyroid problems and cancer.  Medical allergies: yes.  Other surgeries include:  Cancer surgery and other.  Current medications:  Thyroid medication.  Current occupation is .  Patient is working in normal job without restrictions.      Barriers include:  None as reported by the patient.    Red flags:  None as reported by the patient.                        Objective:        Flexibility/Screens:       Lower Extremity:  Decreased left lower extremity flexibility:Hamstrings    Decreased right lower extremity flexibility:  Hamstrings               Lumbar/SI Evaluation  ROM:      Strength: glute medius 4+/5, glute max 4+/5, hip flexion 4+/5, lower abs 4/5.   Lumbar Myotomes:  normal            Lumbar DTR's:  not assessed      Cord Signs:  normal    Lumbar Dermtomes:  normal                                                                         [place] Lumbar Evaluation    Posture:  Sitting: good  Standing: good    Lateral Shift: no  Correction of Posture: better    Movement Loss:  Flexion (Flex): min and pain  Extension (EXT): min and pain  Side [place] R (SG R): nil  Side Glide L (SG L): nil  Test  Movements:    EIS: During: produces  After: no worse  Mechanical Response: no effect  Repeat EIS: During: produces  After: no worse  Mechanical Response: IncROM    EIL: During: produces  After: no worse    Repeat EIL: During: produces  After: no worse          Conclusion: derangement  Principle of Treatment:      Extension: Press ups and standing extension                                           ROS    Assessment/Plan:    Patient is a 63 year old female with lumbar complaints.    Patient has the following significant findings with corresponding treatment plan.                Diagnosis 1:  Left Lumbar Radiculopathy  Pain -  hot/cold therapy, manual therapy, self management, education, directional preference exercise and home program  Decreased ROM/flexibility - manual therapy, therapeutic exercise, therapeutic activity and home program  Decreased strength - therapeutic exercise, therapeutic activities and home program  Impaired muscle performance - neuro re-education and home program  Decreased function - therapeutic activities and home program  Impaired posture - neuro re-education and home program    Therapy Evaluation Codes:   1) History comprised of:   Personal factors that impact the plan of care:      None.    Comorbidity factors that impact the plan of care are:      None.     Medications impacting care: None.  2) Examination of Body Systems comprised of:   Body structures and functions that impact the plan of care:      Lumbar spine.   Activity limitations that impact the plan of care are:      Bending, Lifting and Working.  3) Clinical presentation characteristics are:   Stable/Uncomplicated.  4) Decision-Making    Low complexity using standardized patient assessment instrument and/or measureable assessment of functional outcome.  Cumulative Therapy Evaluation is: Low complexity.    Previous and current functional limitations:  (See Goal Flow Sheet for this information)    Short term and Long term goals:  (See Goal Flow Sheet for this information)     Communication ability:  Patient appears to be able to clearly communicate and understand verbal and written communication and follow directions correctly.  Treatment Explanation - The following has been discussed with the patient:   RX ordered/plan of care  Anticipated outcomes  Possible risks and side effects  This patient would benefit from PT intervention to resume normal activities.   Rehab potential is excellent.    Frequency:  1 X week, once daily  Duration:  for 6 weeks  Discharge Plan:  Achieve all LTG.  Independent in home treatment program.  Reach maximal therapeutic benefit.    Please refer to the daily flowsheet for treatment today, total treatment time and time spent performing 1:1 timed codes.

## 2018-09-12 ENCOUNTER — THERAPY VISIT (OUTPATIENT)
Dept: PHYSICAL THERAPY | Facility: CLINIC | Age: 63
End: 2018-09-12
Payer: COMMERCIAL

## 2018-09-12 DIAGNOSIS — M54.50 LUMBAGO: ICD-10-CM

## 2018-09-12 PROCEDURE — 97112 NEUROMUSCULAR REEDUCATION: CPT | Mod: GP | Performed by: PHYSICAL THERAPIST

## 2018-09-12 PROCEDURE — 97110 THERAPEUTIC EXERCISES: CPT | Mod: GP | Performed by: PHYSICAL THERAPIST

## 2018-09-12 NOTE — MR AVS SNAPSHOT
After Visit Summary   9/12/2018    Lety Baldwin    MRN: [id number]           Patient Information     Date Of Birth          1955        Visit Information        Provider Department      9/12/2018 8:10 AM Darren Darden PT [facility] [place]        Today's Diagnoses     Lumbago           Follow-ups after your visit        Your next 10 appointments already scheduled     Oct 04, 2018  8:20 AM CDT   PHYSICAL with Ernestina Figueroa MD   [facility] ([facility])    [address] [place]  [facility] [id number] [phone]            Oct 04, 2018 10:05 AM CDT   Screening Mammogram with RHBCMA1   [facility] ([facility])    [address] [facility]  [facility] [id number] [phone]           Do NOT use body powder, lotions, perfume or deodorant the day of the exam.  If your last mammogram was not done at [facility], please bring your mammogram films. We will need the name of your provider to send a copy of your report.  A mammogram may be covered on an annual or biannual basis, please check with your insurance company.              Who to contact     If you have questions or need follow up information about today's clinic visit or your schedule please contact [facility] directly at [phone].  Normal or non-critical lab and imaging results will be communicated to you by MyChart, letter or phone within 4 business days after the clinic has received the results. If you do not hear from us within 7 days, please contact the clinic through MyChart or phone. If you have a critical or abnormal lab result, we will notify you by phone as soon as possible.  Submit refill requests through [email or web address] or call your pharmacy and they will forward the refill request to us. Please allow 3 business days for your refill to be completed.          Additional Information About Your Visit        [employer]  Information     [employer] gives you secure access to your electronic health record. If you see a primary care provider, you can also send messages to your care team and make appointments. If you have questions, please call your primary care clinic.  If you do not have a primary care provider, please call [phone] and they will assist you.        Care EveryWhere ID     This is your Care EveryWhere ID. This could be used by other organizations to access your [place] medical records  [id number]        Your Vitals Were     Last Period                   01/01/2004            Blood Pressure from Last 3 Encounters:   05/14/18 112/68   01/25/18 110/64   01/20/18 103/60    Weight from Last 3 Encounters:   08/27/18 71.2 kg (157 lb)   05/14/18 68 kg (150 lb)   01/25/18 68.5 kg (151 lb)              We Performed the Following     NEUROMUSCULAR RE-EDUCATION     THERAPEUTIC EXERCISES          Today's Medication Changes          These changes are accurate as of 9/12/18  8:50 AM.  If you have any questions, ask your nurse or doctor.               These medicines have changed or have updated prescriptions.        Dose/Directions    ascorbic acid 250 MG tablet   Commonly known as:  VITAMIN C   This may have changed:  See the new instructions.        1 TABLET 3 TIMES DAILY   Refills:  0       VIT CALCIUM CITRATE + D TABS 250-62.5 MG-IU OR   This may have changed:  See the new instructions.        Refills:  0                Primary Care Provider Office Phone # Fax #    Ernestina Figueroa [phone]       [address] [facility] [address]  [facility] [address]        Equal Access to Services     [facility] AH: Hadii lorena eubanks hadasho Sokumarali, waaxda luqadaha, qaybta kaalmada adeegyada, jessica fountain . So [facility] [phone].    ATENCIÓN: Si habla español, tiene a ibrahim disposición servicios gratuitos de asistencia lingüística. Llame al [phone].    We comply with applicable federal civil rights laws and  [place] laws. We do not discriminate on the basis of race, color, national origin, age, disability, sex, sexual orientation, or gender identity.            Thank you!     Thank you for choosing [facility]  for your care. Our goal is always to provide you with excellent care. Hearing back from our patients is one way we can continue to improve our services. Please take a few minutes to complete the written survey that you may receive in the mail after your visit with us. Thank you!             Your Updated Medication List - Protect others around you: Learn how to safely use, store and throw away your medicines at www.disposemymeds.org.          This list is accurate as of 9/12/18  8:50 AM.  Always use your most recent med list.                   Brand Name Dispense Instructions for use Diagnosis    ascorbic acid 250 MG tablet    VITAMIN C     1 TABLET 3 TIMES DAILY        FLUCELVAX QUADRIVALENT 0.5 ML Yaa   Generic drug:  Influenza Vac Subunit Quad      TO BE ADMINISTERED BY PHARMACIST FOR IMMUNIZATION        levothyroxine 150 MCG tablet    SYNTHROID/LEVOTHROID    90 tablet    Take 1 tablet (150 mcg) by mouth daily    Acquired hypothyroidism       VIT CALCIUM CITRATE + D TABS 250-62.5 MG-IU OR

## 2018-09-21 ENCOUNTER — THERAPY VISIT (OUTPATIENT)
Dept: CHIROPRACTIC MEDICINE | Facility: CLINIC | Age: 63
End: 2018-09-21
Payer: COMMERCIAL

## 2018-09-21 DIAGNOSIS — M99.02 THORACIC SEGMENT DYSFUNCTION: ICD-10-CM

## 2018-09-21 DIAGNOSIS — M54.50 ACUTE LEFT-SIDED LOW BACK PAIN WITHOUT SCIATICA: Primary | ICD-10-CM

## 2018-09-21 DIAGNOSIS — M99.04 SOMATIC DYSFUNCTION OF SACRAL REGION: ICD-10-CM

## 2018-09-21 DIAGNOSIS — M99.03 SOMATIC DYSFUNCTION OF LUMBAR REGION: ICD-10-CM

## 2018-09-21 PROCEDURE — 97035 APP MDLTY 1+ULTRASOUND EA 15: CPT | Performed by: CHIROPRACTOR

## 2018-09-21 PROCEDURE — 98941 CHIROPRACT MANJ 3-4 REGIONS: CPT | Mod: AT | Performed by: CHIROPRACTOR

## 2018-09-21 PROCEDURE — 99203 OFFICE O/P NEW LOW 30 MIN: CPT | Mod: 25 | Performed by: CHIROPRACTOR

## 2018-09-21 NOTE — PROGRESS NOTES
Initial Chiropractic Clinic Visit    PCP: Ernestina Figueroaang Baldwin is a 63 year old female who is seen  in consultation at the request of  Ernestina Figueroa M.D. presenting with lower lumbar spine pain. Patient reports that the onset was 6 weeks ago after bending and lifting at home.  Patient reports the pain increased gradually. Patient reports she did have pain traveling down the left leg to the foot. Pain currently radiating to left glute. Prior to onset, the patient was able to sit 2 hours without low back pain. Patient notes that due to symptoms, they can only sit 1/2 hour without pain increasing. Lety Baldwin notes   sitting rated at a 4/10 painful, difficult and prior to this incident it was 0/10.        Injury: Bending/lifting grand kids at home.    Location of Pain: left lower lumbar spine, left sacrum, lower thoracic spine at the following level(s) T5 , T10, L5  and PSIS Left   Duration of Pain: 6 weeks  Rating of Pain at worst: 5/10  Rating of Pain Currently: 3/10  Symptoms are better with: Walking  Symptoms are worse with: sitting and bending  Additional Features: denies LE weakness     Health History  as reported by the patient:    How does the patient rate their own health:   Good    Current or past medical history:   Cancer-breast-1997 and Thyroid problems    Medical allergies  None    Past Traumas/Surgeries  Cancer:  Breast-1997 and Other:  Gall bladder    Family History  This patient has no significant family history    Medications:  Thyroid    Occupation:      Primary job tasks:   Computer work and Prolonged sitting    Barriers as home/work:   none    Additional health Issues:     None        Review of Systems  Musculoskeletal: as above  Remainder of review of systems is negative including constitutional, CV, pulmonary, GI, Skin and Neurologic except as noted in HPI or medical history.    Past Medical History:   Diagnosis Date     Disorder of bone and cartilage, unspecified 2004     -2.0 in 2007     Fracture of metatarsal bone of right foot 5/15     Malignant neoplasm of breast (female), unspecified site 8/97    Ductal Carcinoma in situ right     Other specified acquired hypothyroidism      PONV (postoperative nausea and vomiting)      Past Surgical History:   Procedure Laterality Date     BREAST SURGERY  01/18/2018    bilat breast implant exchange     C NONSPECIFIC PROCEDURE  9/00/97    Right Total Mastectomy- Reconstructive Surgery. Abstracted 5/15/02.     CHOLECYSTECTOMY       COLONOSCOPY N/A 10/8/2014    Procedure: COLONOSCOPY;  Surgeon: Jelani Moran MD;  Location:  GI     ENDOSCOPIC RETROGRADE CHOLANGIOPANCREATOGRAM N/A 1/19/2018    Procedure: COMBINED ENDOSCOPIC RETROGRADE CHOLANGIOPANCREATOGRAPHY, SPHINCTEROTOMY;  ENDOSCOPIC RETROGRADE CHOLANGIOPANCREATOGRAM (ERCP), SPHINCTEROTOMY, STONE EXTRACTION;  Surgeon: Patsy Mcclure MD;  Location:  OR     ENDOSCOPIC RETROGRADE CHOLANGIOPANCREATOGRAM N/A 1/19/2018    Procedure: COMBINED ENDOSCOPIC RETROGRADE CHOLANGIOPANCREATOGRAPHY, REMOVE STONE/BALLOON;;  Surgeon: Patsy Mcclure MD;  Location:  OR      BIOPSY OF BREAST, OPEN INCISIONAL  1992    Rt     HC DILATION/CURETTAGE DIAG/THER NON OB  1995    D & C     LAPAROSCOPIC CHOLECYSTECTOMY WITH CHOLANGIOGRAMS N/A 1/18/2018    Procedure: LAPAROSCOPIC CHOLECYSTECTOMY WITH CHOLANGIOGRAMS;;  Surgeon: Peter Winslow MD;  Location: [facility]     RECONSTRUCT BREAST BILATERAL, IMPLANT PROSTHESIS BILATERAL, COMBINED Bilateral 1/18/2018    Procedure: COMBINED RECONSTRUCT BREAST BILATERAL, IMPLANT PROSTHESIS BILATERAL;  BILATERAL BREAST RECONSTRUCTION WITH EXCHANGE OF GEL IMPLANTS AND CAPSULOTOMIES, LAPAROSCOPIC CHOLECYSTECTOMY WITH CHOLANGIOGRAMS;  Surgeon: Derrell Diaz MD;  Location: [facility]     Objective  LMP 01/01/2004      GENERAL APPEARANCE: healthy, alert and no distress   GAIT: NORMAL  SKIN: no suspicious lesions or rashes  NEURO: Normal strength and tone, mentation  "intact and speech normal  PSYCH:  mentation appears normal and affect normal/bright    Low back exam:    Inspection:  \"     no visible deformity in the low back       normal skin\",    ROM:       full extension       limited flexion due to pain    Tender:       paraspinal muscles    Non Tender:       remainder of lumbar spine    Strength:       ankle dorsiflexion 5/5 Normal       ankle plantarflexion 5/5 Normal       dorsiflexion of the great toe 5/5 Normal    Reflexes:       patellar (L3, L4) 2 bilaterally       achilles tendons (S1) 2 bilaterally    Sensation:      grossly intact throughout lower extremities    Special tests:  Kemps - Right negative and Left negative, SLR - Right negative and Left negative, Slump - Right negative and Left negative and Fabere - Right negative and Left negative    Segmental spinal dysfunction/restrictions found at::  T10 Right rotation restricted  L5 Left rotation restricted  PSIS Left Left rotation restricted.    The following soft tissue hypotonicities were observed:Quad lumb: bilateral, referred pain: no    Trigger points were found in:Lumbar erector spine    Muscle spasm found in:Lumbar erector spine, Quad lumb and T-spine paraspinal      Radiology:  none    Assessment:    No diagnosis found.    RX ordered/plan of care  Anticipated outcomes  Possible risks and side effects    After discussing the risk and benefits of care, patient consented to treatment    Prognosis: Good      Patient's condition:  Patient had restrictions pre-manipulation    Treatment effectiveness:  Post manipulation there is better intersegmental movement and Patient claims to feel looser post manipulation      Plan:    Procedures:  Evaluation and Management:  02550 Moderate level exam 30 min    CMT:  35867 Chiropractic manipulative treatment 3-4 regions performed   Thoracic: Diversified, T8, T10, Prone  Lumbar: Diversified, L4, L5, Side posture  Pelvis: Drop Table, PSIS Left , Prone . " LAD-supine    Modalities:  23407: US:  2.0 Medley/cm squared for 8 minutes at 1mhz  cont pulsed, Location:left L/S spine    Therapeutic procedures:  None    Response to Treatment  Reduction in symptoms as reported by patient      Treatment plan and goals:  Goals:  SITTING: the patient specific goal is to attain pre-injury status of  2 hours comfortably  PAIN: the patient specific goal of thier symptoms is to attain the pre-inury state of 0-1/10 on the VAS    Frequency of care  Duration of care is estimated to be 4 weeks, from the initial treatment.  It is estimated that the patient will need a total of 1-3 visits to resolve this episode.  For the initial therapeutic trial of care, the frequency is recommended at 1 X week, once daily.  A reevaluation would be clinically appropriate in 4 visits, to determine progress and further course of care.    In-Office Treatment  Evaluation  04687 Chiropractic manipulative treatment 3-4 regions performed   Thoracic: Diversified, T8, T10, Prone  Lumbar: Diversified, L4, L5, Side posture  Pelvis: Drop Table, PSIS Left , Prone . LAD-supine    Modalities:  99487: US:  2.0 Medley/cm squared for 8 minutes at 1mhz  cont pulsed, Location:left L/S spine      Recommendations:    Instructions:ice 20 minutes every other hour as needed and Continue HEP given in PT    Follow-up:    Return to care in one week if needed.       Discussed the assessment with the patient.      Disclaimer: This note consists of symbols derived from keyboarding, dictation and/or voice recognition software. As a result, there may be errors in the script that have gone undetected. Please consider this when interpreting information found in this chart.

## 2018-09-25 PROBLEM — M65.332 TRIGGER MIDDLE FINGER OF LEFT HAND: Status: RESOLVED | Noted: 2018-06-11 | Resolved: 2018-09-25

## 2018-09-25 PROBLEM — M79.644 PAIN OF FINGER OF RIGHT HAND: Status: RESOLVED | Noted: 2018-07-18 | Resolved: 2018-09-25

## 2018-09-25 PROBLEM — M65.331 TRIGGER MIDDLE FINGER OF RIGHT HAND: Status: RESOLVED | Noted: 2018-07-18 | Resolved: 2018-09-25

## 2018-09-25 PROBLEM — M79.645 PAIN OF FINGER OF LEFT HAND: Status: RESOLVED | Noted: 2018-06-11 | Resolved: 2018-09-25

## 2018-10-03 ENCOUNTER — THERAPY VISIT (OUTPATIENT)
Dept: CHIROPRACTIC MEDICINE | Facility: CLINIC | Age: 63
End: 2018-10-03
Payer: COMMERCIAL

## 2018-10-03 DIAGNOSIS — M99.03 SOMATIC DYSFUNCTION OF LUMBAR REGION: ICD-10-CM

## 2018-10-03 DIAGNOSIS — M99.02 THORACIC SEGMENT DYSFUNCTION: ICD-10-CM

## 2018-10-03 DIAGNOSIS — M99.04 SOMATIC DYSFUNCTION OF SACRAL REGION: ICD-10-CM

## 2018-10-03 DIAGNOSIS — M54.50 ACUTE LEFT-SIDED LOW BACK PAIN WITHOUT SCIATICA: Primary | ICD-10-CM

## 2018-10-03 PROCEDURE — 98941 CHIROPRACT MANJ 3-4 REGIONS: CPT | Mod: AT | Performed by: CHIROPRACTOR

## 2018-10-03 PROCEDURE — 97035 APP MDLTY 1+ULTRASOUND EA 15: CPT | Performed by: CHIROPRACTOR

## 2018-10-03 NOTE — PROGRESS NOTES
Visit #:  2 of 4 based on treatment plan 3-4    Subjective:  Lety Baldwin is a 63 year old female who is seen in f/u up for: presenting with lower lumbar spine pain.     Data Unavailable.     Since last visit on 9/21/18  Lety Baldwin reports the following changes: Pain immediately after last treatment: 2/10 and their pain level today 2/10. Sitting rated at a 2/10 painful, difficult and prior to initial visit 4/10 and prior to this incident it was 0/10. Overall improvement in lower back pain.    Area of chief complaint:  Lumbar :  Symptoms are graded at 2/10. The quality is described as stiff, achey.  Motion has increased, but is still not normal, T10, L5, SIJ--left. Patient feels that they are improved due to a reduction in symptoms.     Since last visit the patient feels that they are 40-50 percent  improved from last visit.       Objective:  The following was observed:    P: palpatory tenderness, left SIJ    A: static palpation demonstrates intersegmental asymmetry , thoracic, lumbar, pelvis    R: motion palpation notes restricted motion, :  T10 Left rotation restricted  L5 Left rotation restricted  PSIS Left Flexion restriction    T: muscle spasm at level(s): Lumbar erector spine, Piriformis and T-spine paraspinal L>>R      Assessment:    Segmental spinal dysfunction/restrictions found at:  T10  L5  PSIS Left    Diagnoses:    No diagnosis found.    Patient's condition:  Patient had restrictions pre-manipulation    Treatment effectiveness:  Post manipulation there is better intersegmental movement and Patient claims to feel looser post manipulation      Procedures:  CMT:  61254 Chiropractic manipulative treatment 3-4 regions performed   Thoracic: Diversified, T8, T10, Prone  Lumbar: Diversified, L4, L5, Side posture  Pelvis: Drop Table, PSIS Left , Prone . LAD-supine    Modalities:  79559: US:  2.0 Medley/cm squared for 8 minutes at 1mhz  cont pulsed, Location:left L/S spine    Therapeutic  procedures:  None      Prognosis: Good    Progress towards Goals: Patient is making progress towards the goal of SITTING: the patient specific goal is to attain pre-injury status of  2 hours comfortably  PAIN: the patient specific goal of thier symptoms is to attain the pre-inury state of 0-1/10 on the VAS  .     Response to Treatment:   Patient tolerated the treatment well today      Recommendations:    Instructions:ice 20 minutes every other hour as needed    Follow-up:  Return to care in one week if needed.

## 2018-10-04 ENCOUNTER — OFFICE VISIT (OUTPATIENT)
Dept: INTERNAL MEDICINE | Facility: CLINIC | Age: 63
End: 2018-10-04
Payer: COMMERCIAL

## 2018-10-04 ENCOUNTER — HOSPITAL ENCOUNTER (OUTPATIENT)
Dept: MAMMOGRAPHY | Facility: CLINIC | Age: 63
Discharge: HOME | End: 2018-10-04
Attending: INTERNAL MEDICINE | Admitting: INTERNAL MEDICINE
Payer: COMMERCIAL

## 2018-10-04 VITALS
HEART RATE: 81 BPM | OXYGEN SATURATION: 100 % | TEMPERATURE: 97.7 F | SYSTOLIC BLOOD PRESSURE: 116 MMHG | WEIGHT: 156.9 LBS | RESPIRATION RATE: 18 BRPM | BODY MASS INDEX: 24.63 KG/M2 | HEIGHT: 67 IN | DIASTOLIC BLOOD PRESSURE: 64 MMHG

## 2018-10-04 DIAGNOSIS — C50.911 MALIGNANT NEOPLASM OF RIGHT FEMALE BREAST, UNSPECIFIED ESTROGEN RECEPTOR STATUS, UNSPECIFIED SITE OF BREAST (H): ICD-10-CM

## 2018-10-04 DIAGNOSIS — Z23 NEED FOR PROPHYLACTIC VACCINATION AND INOCULATION AGAINST INFLUENZA: ICD-10-CM

## 2018-10-04 DIAGNOSIS — E03.9 ACQUIRED HYPOTHYROIDISM: ICD-10-CM

## 2018-10-04 DIAGNOSIS — Z11.59 SCREENING FOR VIRAL DISEASE: ICD-10-CM

## 2018-10-04 DIAGNOSIS — Z00.00 ENCOUNTER FOR ROUTINE ADULT HEALTH EXAMINATION WITHOUT ABNORMAL FINDINGS: Primary | ICD-10-CM

## 2018-10-04 DIAGNOSIS — Z12.31 VISIT FOR SCREENING MAMMOGRAM: ICD-10-CM

## 2018-10-04 LAB
ANION GAP SERPL CALCULATED.3IONS-SCNC: 5 MMOL/L (ref 3–14)
BUN SERPL-MCNC: 16 MG/DL (ref 7–30)
CALCIUM SERPL-MCNC: 9 MG/DL (ref 8.5–10.1)
CHLORIDE SERPL-SCNC: 103 MMOL/L (ref 94–109)
CHOLEST SERPL-MCNC: 170 MG/DL
CO2 SERPL-SCNC: 29 MMOL/L (ref 20–32)
CREAT SERPL-MCNC: 0.71 MG/DL (ref 0.52–1.04)
GFR SERPL CREATININE-BSD FRML MDRD: 83 ML/MIN/1.7M2
GLUCOSE SERPL-MCNC: 94 MG/DL (ref 70–99)
HDLC SERPL-MCNC: 74 MG/DL
LDLC SERPL CALC-MCNC: 85 MG/DL
NONHDLC SERPL-MCNC: 96 MG/DL
POTASSIUM SERPL-SCNC: 4.3 MMOL/L (ref 3.4–5.3)
SODIUM SERPL-SCNC: 137 MMOL/L (ref 133–144)
TRIGL SERPL-MCNC: 57 MG/DL
TSH SERPL DL<=0.005 MIU/L-ACNC: 1.88 MU/L (ref 0.4–4)

## 2018-10-04 PROCEDURE — G0145 SCR C/V CYTO,THINLAYER,RESCR: HCPCS | Performed by: INTERNAL MEDICINE

## 2018-10-04 PROCEDURE — 77067 SCR MAMMO BI INCL CAD: CPT | Mod: 52

## 2018-10-04 PROCEDURE — 87624 HPV HI-RISK TYP POOLED RSLT: CPT | Performed by: INTERNAL MEDICINE

## 2018-10-04 PROCEDURE — 90682 RIV4 VACC RECOMBINANT DNA IM: CPT | Performed by: INTERNAL MEDICINE

## 2018-10-04 PROCEDURE — 36415 COLL VENOUS BLD VENIPUNCTURE: CPT | Performed by: INTERNAL MEDICINE

## 2018-10-04 PROCEDURE — 99396 PREV VISIT EST AGE 40-64: CPT | Mod: 25 | Performed by: INTERNAL MEDICINE

## 2018-10-04 PROCEDURE — 87389 HIV-1 AG W/HIV-1&-2 AB AG IA: CPT | Performed by: INTERNAL MEDICINE

## 2018-10-04 PROCEDURE — 80048 BASIC METABOLIC PNL TOTAL CA: CPT | Performed by: INTERNAL MEDICINE

## 2018-10-04 PROCEDURE — 84443 ASSAY THYROID STIM HORMONE: CPT | Performed by: INTERNAL MEDICINE

## 2018-10-04 PROCEDURE — 80061 LIPID PANEL: CPT | Performed by: INTERNAL MEDICINE

## 2018-10-04 PROCEDURE — 90471 IMMUNIZATION ADMIN: CPT | Performed by: INTERNAL MEDICINE

## 2018-10-04 RX ORDER — LEVOTHYROXINE SODIUM 150 UG/1
150 TABLET ORAL DAILY
Qty: 90 TABLET | Refills: 3 | Status: SHIPPED | OUTPATIENT
Start: 2018-10-04 | End: 2019-09-13

## 2018-10-04 NOTE — PATIENT INSTRUCTIONS
Shingrix is the new shingles vaccine.        PREVENTIVE HEALTH RECOMMENDATIONS:     Vaccines: Get a flu shot each year. Get a tetanus shot every 10 years.     Exercise for at least 150 minutes a week (an average of 30 minutes a day, 5 days of the week). This will help you control your weight and prevent disease.    Limit alcohol to one drink per day.    No smoking.     Wear sunscreen to prevent skin cancer.     See your dentist twice a year for an exam and cleaning.    Try to get Calcium 1200 mg total per day. It is best to not take it all at once. Try to get Vitamin D at least 0591-9274 units per day.    BMI or Body Mass Index is a way of indicating weight and health risk for cardiovascular diseases, high blood pressure, diabetes.   Definitions:    Underweight is less than 18.5 and will be associated with health risk.   Normal BMI is 18.5 to 25   Overweight is 25-29   Obesity is 30 or greater   Morbid Obesity is 40 or greater or 35 or greater with diabetes, prediabetes or abnormal blood sugar, high blood pressure or elevated cholesterol  Obesity and Morbid Obesity are associated with higher health risks. Lowering calories, exercising more may lower your BMI and even small decreases can have positive impact on lowering health risks.   Your Body mass index is 24.76 kg/(m^2)..,

## 2018-10-04 NOTE — PROGRESS NOTES
SUBJECTIVE:   CC: Lety Baldwin is an 63 year old woman who presents for preventive health visit.     Physical   Annual:     Getting at least 3 servings of Calcium per day:  Yes    Bi-annual eye exam:  Yes    Dental care twice a year:  Yes    Sleep apnea or symptoms of sleep apnea:  None    Diet:  Regular (no restrictions)    Frequency of exercise:  4-5 days/week    Duration of exercise:  15-30 minutes    Taking medications regularly:  Yes    Medication side effects:  None    Additional concerns today:  No      Current concerns:       Today's PHQ-2 Score:   PHQ-2 ( 1999 [employer]) 10/4/2018   Q1: Little interest or pleasure in doing things 0   Q2: Feeling down, depressed or hopeless 0   PHQ-2 Score 0   Q1: Little interest or pleasure in doing things Not at all   Q2: Feeling down, depressed or hopeless Not at all   PHQ-2 Score 0     Answers for HPI/ROS submitted by the patient on 10/4/2018   PHQ-2 Score: 0    Abuse: Current or Past(Physical, Sexual or Emotional)- No  Do you feel safe in your environment - Yes    Social History   Substance Use Topics     Smoking status: Never Smoker     Smokeless tobacco: Never Used     Alcohol use No     Alcohol Use 10/4/2018   If you drink alcohol do you typically have greater than 3 drinks per day OR greater than 7 drinks per week? Not Applicable   No flowsheet data found.    Reviewed orders with patient.  Reviewed health maintenance and updated orders accordingly - Yes      Alternate mammogram schedule due to breast cancer history yearly    Pertinent mammograms are reviewed under the imaging tab.  History of abnormal Pap smear: NO - age 30-65 PAP every 5 years with negative HPV co-testing recommended  PAP / HPV 9/28/2015 9/28/2012 10/6/2009   PAP NIL NIL NIL     Reviewed and updated as needed this visit by clinical staff  Tobacco  Allergies  Meds  Med Hx  Surg Hx  Fam Hx  Soc Hx        Reviewed and updated as needed this visit by Provider            Review of Systems      "  Physical Exam    Patient Active Problem List   Diagnosis     Breast cancer (H)     Acquired hypothyroidism     Disorder of bone and cartilage     CARDIOVASCULAR SCREENING; LDL GOAL LESS THAN 160     Advanced directives, counseling/discussion     Lumbago     Current Outpatient Prescriptions   Medication Sig Dispense Refill     levothyroxine (SYNTHROID/LEVOTHROID) 150 MCG tablet Take 1 tablet (150 mcg) by mouth daily 90 tablet 3     VIT CALCIUM CITRATE + D TABS 250-62.5 MG-IU OR  (Patient taking differently: 1 tablet daily)       VITAMIN C 250 MG OR TABS 1 TABLET 3 TIMES DAILY (Patient taking differently: 1 TABLET  DAILY)        Review Of Systems  Skin: negative  Eyes: negative  Ears/Nose/Throat: negative  Respiratory: No dyspnea on exertion and No cough  Cardiovascular: negative  Gastrointestinal: negative  Genitourinary: negative  Musculoskeletal: negative  Neurologic: negative  Psychiatric: negative  Hematologic/Lymphatic/Immunologic: negative  Endocrine: negative   Gynecologic ros reveals:no breast pain or new or enlarging lumps on self exam, no recurrence of cancer,  no vaginal bleeding, no discharge or pelvic pain    Objective:  Patient alert, in no acute distress  /64 (BP Location: Right arm, Patient Position: Chair, Cuff Size: Adult Large)  Pulse 81  Temp 97.7  F (36.5  C) (Oral)  Resp 18  Ht 5' 6.75\" (1.695 m)  Wt 156 lb 14.4 oz (71.2 kg)  LMP 01/01/2004  SpO2 100%  BMI 24.76 kg/m2    HEENT: extraocular movements are intact, pupils equal and reactive to light and accommodation, TMs clear, oropharynx clear  NECK: Neck supple. No adenopathy. Thyroid symmetric, normal size,, Carotids without bruits.  PULMONARY: clear to auscultation  CARDIAC: regular rate and rhythm and no murmurs, clicks, or gallops  PULSES: 2/2 throughout  BACK: no spinal or CVAT  ABDOMINAL: Soft, nontender.  Normal bowel sounds.  No hepatosplenomegaly or abnormal masses  BREAST: No breast masses or tenderness, No axillary " "masses or tenderness and No galactorrhea, implants intact  PELVIC: external genitalia normal, vaginal mucosa normal, cervix normal, specimen sent for pap, bimanual exam with normal uterus and adnexa, r  REFLEXES: 2+ throughout  SKIN: unremarkable           ASSESSMENT/PLAN:   1. Encounter for routine adult health examination without abnormal findings    - Basic metabolic panel  - Lipid panel reflex to direct LDL Fasting  - Pap imaged thin layer screen with HPV - recommended age 30 - 65 years (select HPV order below)  - HPV High Risk Types DNA Cervical    2. Malignant neoplasm of right female breast, unspecified estrogen receptor status, unspecified site of breast (H)  No recurrence, mammogram up to date    3. Acquired hypothyroidism  recheck  - levothyroxine (SYNTHROID/LEVOTHROID) 150 MCG tablet; Take 1 tablet (150 mcg) by mouth daily  Dispense: 90 tablet; Refill: 3  - TSH with free T4 reflex    4. Screening for viral disease    - HIV Antigen Antibody Combo    5. Need for prophylactic vaccination and inoculation against influenza    - FLU VACCINE, (RIV4) RECOMBINANT HA  , IM (FluBlok, egg free) [47645]- >18 YRS ([facility] recommended  50-64 YRS)  - Vaccine Administration, Initial [15514]    COUNSELING:  Reviewed preventive health counseling, as reflected in patient instructions    BP Readings from Last 1 Encounters:   10/04/18 116/64     Estimated body mass index is 24.76 kg/(m^2) as calculated from the following:    Height as of this encounter: 5' 6.75\" (1.695 m).    Weight as of this encounter: 156 lb 14.4 oz (71.2 kg).           reports that she has never smoked. She has never used smokeless tobacco.      Counseling Resources:  ATP IV Guidelines  Pooled Cohorts Equation Calculator  Breast Cancer Risk Calculator  FRAX Risk Assessment  ICSI Preventive Guidelines  Dietary Guidelines for Americans, 2010  [employer]'s MyPlate  ASA Prophylaxis  Lung CA Screening    Ernestina Figueroa MD  [facility]  "

## 2018-10-04 NOTE — MR AVS SNAPSHOT
After Visit Summary   10/4/2018    Lety Baldwin    MRN: [id number]           Patient Information     Date Of Birth          1955        Visit Information        Provider Department      10/4/2018 8:20 AM Ernestina Figueroa MD [facility]        Today's Diagnoses     Encounter for routine adult health examination without abnormal findings    -  1    Acquired hypothyroidism        Screening for viral disease          Care Instructions    Shingrix is the new shingles vaccine.        PREVENTIVE HEALTH RECOMMENDATIONS:     Vaccines: Get a flu shot each year. Get a tetanus shot every 10 years.     Exercise for at least 150 minutes a week (an average of 30 minutes a day, 5 days of the week). This will help you control your weight and prevent disease.    Limit alcohol to one drink per day.    No smoking.     Wear sunscreen to prevent skin cancer.     See your dentist twice a year for an exam and cleaning.    Try to get Calcium 1200 mg total per day. It is best to not take it all at once. Try to get Vitamin D at least 1576-5816 units per day.    BMI or Body Mass Index is a way of indicating weight and health risk for cardiovascular diseases, high blood pressure, diabetes.   Definitions:    Underweight is less than 18.5 and will be associated with health risk.   Normal BMI is 18.5 to 25   Overweight is 25-29   Obesity is 30 or greater   Morbid Obesity is 40 or greater or 35 or greater with diabetes, prediabetes or abnormal blood sugar, high blood pressure or elevated cholesterol  Obesity and Morbid Obesity are associated with higher health risks. Lowering calories, exercising more may lower your BMI and even small decreases can have positive impact on lowering health risks.   Your Body mass index is 24.76 kg/(m^2)..,              Follow-ups after your visit        Your next 10 appointments already scheduled     Oct 04, 2018 10:05 AM CDT   Screening Mammogram with [id number]   [facility]  "Imaging ([facility])    [address], [address]  [facility] [address] [phone]           Do NOT use body powder, lotions, perfume or deodorant the day of the exam.  If your last mammogram was not done at [place], please bring your mammogram films. We will need the name of your provider to send a copy of your report.  A mammogram may be covered on an annual or biannual basis, please check with your insurance company.              Who to contact     If you have questions or need follow up information about today's clinic visit or your schedule please contact [facility] directly at [phone].  Normal or non-critical lab and imaging results will be communicated to you by MyChart, letter or phone within 4 business days after the clinic has received the results. If you do not hear from us within 7 days, please contact the clinic through [facility] or phone. If you have a critical or abnormal lab result, we will notify you by phone as soon as possible.  Submit refill requests through [facility] or call your pharmacy and they will forward the refill request to us. Please allow 3 business days for your refill to be completed.          Additional Information About Your Visit        [facility] Information     [facility] gives you secure access to your electronic health record. If you see a primary care provider, you can also send messages to your care team and make appointments. If you have questions, please call your primary care clinic.  If you do not have a primary care provider, please call [phone] and they will assist you.        Care EveryWhere ID     This is your Care EveryWhere ID. This could be used by other organizations to access your [facility] medical records  [id number]        Your Vitals Were     Pulse Temperature Respirations Height Last Period Pulse Oximetry    81 97.7  F (36.5  C) (Oral) 18 5' 6.75\" (1.695 m) 01/01/2004 100%    BMI (Body Mass Index)                "    24.76 kg/m2            Blood Pressure from Last 3 Encounters:   10/04/18 116/64   05/14/18 112/68   01/25/18 110/64    Weight from Last 3 Encounters:   10/04/18 156 lb 14.4 oz (71.2 kg)   08/27/18 157 lb (71.2 kg)   05/14/18 150 lb (68 kg)              We Performed the Following     Basic metabolic panel     HIV Antigen Antibody Combo     Lipid panel reflex to direct LDL Fasting     TSH with free T4 reflex          Today's Medication Changes          These changes are accurate as of 10/4/18  9:04 AM.  If you have any questions, ask your nurse or doctor.               These medicines have changed or have updated prescriptions.        Dose/Directions    ascorbic acid 250 MG tablet   Commonly known as:  VITAMIN C   This may have changed:  See the new instructions.        1 TABLET 3 TIMES DAILY   Refills:  0       VIT CALCIUM CITRATE + D TABS 250-62.5 MG-IU OR   This may have changed:  See the new instructions.        Refills:  0            Where to get your medicines      These medications were sent to [facility]pharm[facility] #[id number] - [place], [place] - [address] AT [address], [place] [address]     Phone:  [phone]     levothyroxine 150 MCG tablet                Primary Care Provider Office Phone # Fax #    Ernestina Figueroa [phone]       [address] [facility] [address]  [facility] [address]        Equal Access to Services     MELODY RICHARDSON AH: Hadii lorena mikeo Sogray, waaxda luqadaha, qaybta kaalmada maria g, jessica fountain . So [facility] [phone].    ATENCIÓN: Si habla español, tiene a ibrahim disposición servicios gratuitos de asistencia lingüística. Brendon al [phone].    We comply with applicable federal civil rights laws and [place] laws. We do not discriminate on the basis of race, color, national origin, age, disability, sex, sexual orientation, or gender identity.            Thank you!     Thank you for choosing [facility]  [facility]  for your care. Our goal is always to provide you with excellent care. Hearing back from our patients is one way we can continue to improve our services. Please take a few minutes to complete the written survey that you may receive in the mail after your visit with us. Thank you!             Your Updated Medication List - Protect others around you: Learn how to safely use, store and throw away your medicines at www.disposemymeds.org.          This list is accurate as of 10/4/18  9:04 AM.  Always use your most recent med list.                   Brand Name Dispense Instructions for use Diagnosis    ascorbic acid 250 MG tablet    VITAMIN C     1 TABLET 3 TIMES DAILY        levothyroxine 150 MCG tablet    SYNTHROID/LEVOTHROID    90 tablet    Take 1 tablet (150 mcg) by mouth daily    Acquired hypothyroidism       VIT CALCIUM CITRATE + D TABS 250-62.5 MG-IU OR

## 2018-10-04 NOTE — PROGRESS NOTES
Injectable Influenza Immunization Documentation    1.  Is the person to be vaccinated sick today?   No    2. Does the person to be vaccinated have an allergy to a component   of the vaccine?   No  Egg Allergy Algorithm Link    3. Has the person to be vaccinated ever had a serious reaction   to influenza vaccine in the past?   No    4. Has the person to be vaccinated ever had Guillain-Barré syndrome?   No    Form completed by Ly Ferreira CMA ([facility])    Prior to injection verified patient identity using patient's name and date of birth.  Due to injection administration, patient instructed to remain in clinic for 15 minutes  afterwards, and to report any adverse reaction to me immediately.

## 2018-10-05 LAB — HIV 1+2 AB+HIV1 P24 AG SERPL QL IA: NONREACTIVE

## 2018-10-09 PROBLEM — K80.50 CHOLEDOCHOLITHIASIS: Status: RESOLVED | Noted: 2018-01-18 | Resolved: 2018-10-09

## 2018-10-09 LAB
COPATH REPORT: NORMAL
PAP: NORMAL

## 2018-10-10 ENCOUNTER — MYC MEDICAL ADVICE (OUTPATIENT)
Dept: INTERNAL MEDICINE | Facility: CLINIC | Age: 63
End: 2018-10-10

## 2018-10-10 DIAGNOSIS — Z78.0 ASYMPTOMATIC POSTMENOPAUSAL STATUS: Primary | ICD-10-CM

## 2018-10-16 LAB
FINAL DIAGNOSIS: NORMAL
HPV HR 12 DNA CVX QL NAA+PROBE: NEGATIVE
HPV16 DNA SPEC QL NAA+PROBE: NEGATIVE
HPV18 DNA SPEC QL NAA+PROBE: NEGATIVE
SPECIMEN DESCRIPTION: NORMAL
SPECIMEN SOURCE CVX/VAG CYTO: NORMAL

## 2018-11-05 ENCOUNTER — RADIANT APPOINTMENT (OUTPATIENT)
Dept: BONE DENSITY | Facility: CLINIC | Age: 63
End: 2018-11-05
Attending: INTERNAL MEDICINE
Payer: COMMERCIAL

## 2018-11-05 DIAGNOSIS — Z78.0 ASYMPTOMATIC POSTMENOPAUSAL STATUS: ICD-10-CM

## 2018-11-05 PROCEDURE — 77080 DXA BONE DENSITY AXIAL: CPT | Performed by: INTERNAL MEDICINE

## 2019-03-11 PROBLEM — M54.50 LUMBAGO: Status: RESOLVED | Noted: 2018-09-04 | Resolved: 2019-03-11

## 2019-03-11 NOTE — PROGRESS NOTES
Lety Baldwin was seen 2 times for evaluation and treatment.  Patient did not return for further treatment and current status is unknown.  Due to short treatment duration, no objective or functional changes were made.  Please see goal flow sheet from episode noted date below and initial evaluation for further information.    Linked Episodes   Type: Episode: Status: Noted: Resolved: Last update: Updated by:   PHYSICAL THERAPY Left LBP 9/4/18 Active 9/4/2018 9/12/2018  8:16 AM Darren Darden, PT      Comments:

## 2019-09-10 ASSESSMENT — ENCOUNTER SYMPTOMS
WEAKNESS: 0
FREQUENCY: 0
ABDOMINAL PAIN: 0
PALPITATIONS: 0
SHORTNESS OF BREATH: 0
CONSTIPATION: 0
COUGH: 0
BREAST MASS: 0
DIARRHEA: 0
HEADACHES: 0
EYE PAIN: 0
HEMATOCHEZIA: 0
DYSURIA: 0
JOINT SWELLING: 0
FEVER: 0
NAUSEA: 0
CHILLS: 0
ARTHRALGIAS: 0
HEARTBURN: 0
MYALGIAS: 0
NERVOUS/ANXIOUS: 0
HEMATURIA: 0
PARESTHESIAS: 0
DIZZINESS: 0
SORE THROAT: 0

## 2019-09-13 ENCOUNTER — HOSPITAL ENCOUNTER (OUTPATIENT)
Dept: MAMMOGRAPHY | Facility: CLINIC | Age: 64
Discharge: HOME | End: 2019-09-13
Attending: INTERNAL MEDICINE | Admitting: INTERNAL MEDICINE
Payer: COMMERCIAL

## 2019-09-13 ENCOUNTER — OFFICE VISIT (OUTPATIENT)
Dept: INTERNAL MEDICINE | Facility: CLINIC | Age: 64
End: 2019-09-13
Payer: COMMERCIAL

## 2019-09-13 VITALS
TEMPERATURE: 98.3 F | DIASTOLIC BLOOD PRESSURE: 62 MMHG | BODY MASS INDEX: 24.81 KG/M2 | HEIGHT: 67 IN | OXYGEN SATURATION: 100 % | SYSTOLIC BLOOD PRESSURE: 92 MMHG | RESPIRATION RATE: 16 BRPM | WEIGHT: 158.1 LBS | HEART RATE: 96 BPM

## 2019-09-13 DIAGNOSIS — C50.911 MALIGNANT NEOPLASM OF RIGHT FEMALE BREAST, UNSPECIFIED ESTROGEN RECEPTOR STATUS, UNSPECIFIED SITE OF BREAST (H): ICD-10-CM

## 2019-09-13 DIAGNOSIS — Z00.00 ENCOUNTER FOR ROUTINE ADULT HEALTH EXAMINATION WITHOUT ABNORMAL FINDINGS: Primary | ICD-10-CM

## 2019-09-13 DIAGNOSIS — Z23 NEED FOR PROPHYLACTIC VACCINATION AND INOCULATION AGAINST INFLUENZA: ICD-10-CM

## 2019-09-13 DIAGNOSIS — Z12.31 SCREENING MAMMOGRAM FOR HIGH-RISK PATIENT: ICD-10-CM

## 2019-09-13 DIAGNOSIS — E03.9 ACQUIRED HYPOTHYROIDISM: ICD-10-CM

## 2019-09-13 PROCEDURE — 90471 IMMUNIZATION ADMIN: CPT | Performed by: INTERNAL MEDICINE

## 2019-09-13 PROCEDURE — 90682 RIV4 VACC RECOMBINANT DNA IM: CPT | Performed by: INTERNAL MEDICINE

## 2019-09-13 PROCEDURE — 36415 COLL VENOUS BLD VENIPUNCTURE: CPT | Performed by: INTERNAL MEDICINE

## 2019-09-13 PROCEDURE — 77067 SCR MAMMO BI INCL CAD: CPT | Mod: 52

## 2019-09-13 PROCEDURE — 80048 BASIC METABOLIC PNL TOTAL CA: CPT | Performed by: INTERNAL MEDICINE

## 2019-09-13 PROCEDURE — 84443 ASSAY THYROID STIM HORMONE: CPT | Performed by: INTERNAL MEDICINE

## 2019-09-13 PROCEDURE — 99213 OFFICE O/P EST LOW 20 MIN: CPT | Mod: 25 | Performed by: INTERNAL MEDICINE

## 2019-09-13 PROCEDURE — 99396 PREV VISIT EST AGE 40-64: CPT | Mod: 25 | Performed by: INTERNAL MEDICINE

## 2019-09-13 PROCEDURE — 80061 LIPID PANEL: CPT | Performed by: INTERNAL MEDICINE

## 2019-09-13 RX ORDER — LEVOTHYROXINE SODIUM 150 UG/1
150 TABLET ORAL DAILY
Qty: 90 TABLET | Refills: 3 | Status: SHIPPED | OUTPATIENT
Start: 2019-09-13 | End: 2020-09-17

## 2019-09-13 ASSESSMENT — ENCOUNTER SYMPTOMS
SHORTNESS OF BREATH: 0
FEVER: 0
ABDOMINAL PAIN: 0
PALPITATIONS: 0
WEAKNESS: 0
CHILLS: 0
BREAST MASS: 0
JOINT SWELLING: 0
MYALGIAS: 0
NAUSEA: 0
EYE PAIN: 0
HEADACHES: 0
COUGH: 0
CONSTIPATION: 0
DIARRHEA: 0
NERVOUS/ANXIOUS: 0
DIZZINESS: 0
ARTHRALGIAS: 0
FREQUENCY: 0
HEMATOCHEZIA: 0
HEARTBURN: 0
PARESTHESIAS: 0
SORE THROAT: 0
HEMATURIA: 0
DYSURIA: 0

## 2019-09-13 ASSESSMENT — MIFFLIN-ST. JEOR: SCORE: 1300.8

## 2019-09-13 NOTE — NURSING NOTE
"Vital signs:  Temp: 98.3  F (36.8  C) Temp src: Oral BP: 92/62 Pulse: 96   Resp: 16 SpO2: 100 %     Height: 169.5 cm (5' 6.75\") Weight: 71.7 kg (158 lb 1.6 oz)  Estimated body mass index is 24.95 kg/m  as calculated from the following:    Height as of this encounter: 1.695 m (5' 6.75\").    Weight as of this encounter: 71.7 kg (158 lb 1.6 oz).        Mammogram schedule for today at 10:50 today. Colonoscopy schedule for 10/04/2019.    Pt already has a HCD and will bring in a copy.    "

## 2019-09-13 NOTE — PROGRESS NOTES
SUBJECTIVE:   CC: Lety Baldwin is an 64 year old woman who presents for preventive health visit.     Healthy Habits:     Getting at least 3 servings of Calcium per day:  Yes    Bi-annual eye exam:  Yes    Dental care twice a year:  Yes    Sleep apnea or symptoms of sleep apnea:  None    Diet:  Regular (no restrictions)    Frequency of exercise:  4-5 days/week    Duration of exercise:  30-45 minutes    Taking medications regularly:  Yes    Medication side effects:  None    PHQ-2 Total Score: 0    Additional concerns today:  Yes    Reports that she found out there was a recall on her right breast implant so she will have to have it  replaced.  Currently can be associated with cancer so she will be scheduling this in the future but it needs to be approved by insurance.    She has some questions about shingles vaccine.  Some family members had shingles recently.    She reports she is under stress with her 's aggressive prostate cancer but he has shown a response to chemotherapy.  He still is not having much energy yet but she feels overall she is coping with her stress.    She has some questions about blood testing for Alzheimer's, there have been some family history and grandparent    Problems:  Hypothyroidism: No symptoms to suggest her thyroid dose of Zoloft.  Breast cancer: No skin changes or masses, mammogram is scheduled    Today's PHQ-2 Score:   PHQ-2 ( 1999 Pfizer) 9/10/2019   Q1: Little interest or pleasure in doing things 0   Q2: Feeling down, depressed or hopeless 0   PHQ-2 Score 0   Q1: Little interest or pleasure in doing things Not at all   Q2: Feeling down, depressed or hopeless Not at all   PHQ-2 Score 0       Abuse: Current or Past(Physical, Sexual or Emotional)- No  Do you feel safe in your environment? Yes    Social History     Tobacco Use     Smoking status: Never Smoker     Smokeless tobacco: Never Used   Substance Use Topics     Alcohol use: No     If you drink alcohol do you  typically have >3 drinks per day or >7 drinks per week? No    Alcohol Use 9/10/2019   Prescreen: >3 drinks/day or >7 drinks/week? No   Prescreen: >3 drinks/day or >7 drinks/week? -   No flowsheet data found.    Reviewed orders with patient.  Reviewed health maintenance and updated orders accordingly - Yes      Alternate mammogram schedule due to breast cancer history yearly    Pertinent mammograms are reviewed under the imaging tab.  History of abnormal Pap smear: NO - age 30-65 PAP every 5 years with negative HPV co-testing recommended  PAP / HPV Latest Ref Rng & Units 10/4/2018 9/28/2015 9/28/2012   PAP - NIL NIL NIL   HPV 16 DNA NEG:Negative Negative - -   HPV 18 DNA NEG:Negative Negative - -   OTHER HR HPV NEG:Negative Negative - -     Reviewed and updated as needed this visit by clinical staff         Reviewed and updated as needed this visit by Provider        Patient Active Problem List   Diagnosis     Breast cancer (H)     Acquired hypothyroidism     Disorder of bone and cartilage     CARDIOVASCULAR SCREENING; LDL GOAL LESS THAN 130     Advanced directives, counseling/discussion     Current Outpatient Medications   Medication Sig Dispense Refill     levothyroxine (SYNTHROID/LEVOTHROID) 150 MCG tablet Take 1 tablet (150 mcg) by mouth daily 90 tablet 3     VIT CALCIUM CITRATE + D TABS 250-62.5 MG-IU OR  (Patient taking differently: 1 tablet daily)       VITAMIN C 250 MG OR TABS 1 TABLET 3 TIMES DAILY (Patient taking differently: 1 TABLET  DAILY)          Review of Systems   Constitutional: Negative for chills and fever.   HENT: Negative for congestion, ear pain, hearing loss and sore throat.    Eyes: Negative for pain and visual disturbance.   Respiratory: Negative for cough and shortness of breath.    Cardiovascular: Negative for chest pain, palpitations and peripheral edema.   Gastrointestinal: Negative for abdominal pain, constipation, diarrhea, heartburn, hematochezia and nausea.   Breasts:  Negative for  "tenderness, breast mass and discharge.   Genitourinary: Negative for dysuria, frequency, genital sores, hematuria, pelvic pain, urgency, vaginal bleeding and vaginal discharge.   Musculoskeletal: Negative for arthralgias, joint swelling and myalgias.   Skin: Negative for rash.   Neurological: Negative for dizziness, weakness, headaches and paresthesias.   Psychiatric/Behavioral: Negative for mood changes. The patient is not nervous/anxious.           OBJECTIVE:   LMP 01/01/2004   Physical Exam        Objective:  Patient alert, in no acute distress  BP 92/62   Pulse 96   Temp 98.3  F (36.8  C) (Oral)   Resp 16   Ht 1.695 m (5' 6.75\")   Wt 71.7 kg (158 lb 1.6 oz)   LMP 01/01/2004   SpO2 100%   BMI 24.95 kg/m      HEENT: extraocular movements are intact, pupils equal and reactive to light and accommodation, TMs clear, oropharynx clear  NECK: Neck supple. No adenopathy. Thyroid symmetric, normal size,, Carotids without bruits.  PULMONARY: clear to auscultation  CARDIAC: regular rate and rhythm and no murmurs, clicks, or gallops  PULSES: 2/2 throughout  BACK: no spinal or CVAT  ABDOMINAL: Soft, nontender.  Normal bowel sounds.  No hepatosplenomegaly or abnormal masses  BREAST: No breast masses or tenderness, No axillary masses or tenderness and No galactorrhea  PELVIC: external genitalia normal,  bimanual exam with normal uterus and adnexa,   REFLEXES: 2+ throughout  SKIN: unremarkable           ASSESSMENT/PLAN:   1. Encounter for routine adult health examination without abnormal findings  Advised about shingles vaccine.  Advised that the blood tests for Alzheimer's is not getting regular use, would not recommend pursuing at this time.  - Basic metabolic panel  (Ca, Cl, CO2, Creat, Gluc, K, Na, BUN)  - Lipid panel reflex to direct LDL Fasting    2. Malignant neoplasm of right female breast, unspecified estrogen receptor status, unspecified site of breast (H)  No evidence of recurrence, she will eventually have " "her implant exchanged    3. Acquired hypothyroidism  Clinically stable, recheck lab  - levothyroxine (SYNTHROID/LEVOTHROID) 150 MCG tablet; Take 1 tablet (150 mcg) by mouth daily  Dispense: 90 tablet; Refill: 3  - TSH with free T4 reflex    4. Need for prophylactic vaccination and inoculation against influenza    - INFLUENZA QUAD, RECOMBINANT, P-FREE (RIV4) (FLUBLOCK) [30610]  - Vaccine Administration, Initial [52905]    COUNSELING:  Reviewed preventive health counseling, as reflected in patient instructions    Estimated body mass index is 24.76 kg/m  as calculated from the following:    Height as of 10/4/18: 1.695 m (5' 6.75\").    Weight as of 10/4/18: 71.2 kg (156 lb 14.4 oz).         reports that she has never smoked. She has never used smokeless tobacco.      Counseling Resources:  ATP IV Guidelines  Pooled Cohorts Equation Calculator  Breast Cancer Risk Calculator  FRAX Risk Assessment  ICSI Preventive Guidelines  Dietary Guidelines for Americans, 2010  USDA's MyPlate  ASA Prophylaxis  Lung CA Screening    Ernestina Figueroa MD  [facility]  "

## 2019-09-13 NOTE — PATIENT INSTRUCTIONS
Shingrix is the new shingles vaccine. Call insurance to find out if covered, whether covered at a pharmacy or doctor's office and the copay or check with pharmacy.       PREVENTIVE HEALTH RECOMMENDATIONS:     Vaccines: Get a flu shot each year. Get a tetanus shot every 10 years.     Exercise for at least 150 minutes a week (an average of 30 minutes a day, 5 days of the week). This will help you control your weight and prevent disease.    Limit alcohol to one drink per day.    No smoking.     Wear sunscreen to prevent skin cancer.     See your dentist twice a year for an exam and cleaning.    Try to get Calcium 1200 mg total per day. It is best to not take it all at once. Try to get Vitamin D at least 4060-4504 units (25-50 mcg) per day.    BMI or Body Mass Index is a way of indicating weight and health risk for cardiovascular diseases, high blood pressure, diabetes.   Definitions:    Underweight is less than 18.5 and will be associated with health risk.   Normal BMI is 18.5 to 25   Overweight is 25-29   Obesity is 30 or greater   Morbid Obesity is 40 or greater or 35 or greater with diabetes, prediabetes or abnormal blood sugar, high blood pressure or elevated cholesterol  Obesity and Morbid Obesity are associated with higher health risks. Lowering calories, exercising more may lower your BMI and even small decreases can have positive impact on lowering health risks.   Your Body mass index is 24.95 kg/m ..,

## 2019-09-14 LAB
ANION GAP SERPL CALCULATED.3IONS-SCNC: 5 MMOL/L (ref 3–14)
BUN SERPL-MCNC: 17 MG/DL (ref 7–30)
CALCIUM SERPL-MCNC: 9.5 MG/DL (ref 8.5–10.1)
CHLORIDE SERPL-SCNC: 103 MMOL/L (ref 94–109)
CHOLEST SERPL-MCNC: 180 MG/DL
CO2 SERPL-SCNC: 30 MMOL/L (ref 20–32)
CREAT SERPL-MCNC: 0.68 MG/DL (ref 0.52–1.04)
GFR SERPL CREATININE-BSD FRML MDRD: >90 ML/MIN/{1.73_M2}
GLUCOSE SERPL-MCNC: 90 MG/DL (ref 70–99)
HDLC SERPL-MCNC: 72 MG/DL
LDLC SERPL CALC-MCNC: 93 MG/DL
NONHDLC SERPL-MCNC: 108 MG/DL
POTASSIUM SERPL-SCNC: 4.3 MMOL/L (ref 3.4–5.3)
SODIUM SERPL-SCNC: 138 MMOL/L (ref 133–144)
TRIGL SERPL-MCNC: 77 MG/DL
TSH SERPL DL<=0.005 MIU/L-ACNC: 1.88 MU/L (ref 0.4–4)

## 2019-09-15 ENCOUNTER — MYC MEDICAL ADVICE (OUTPATIENT)
Dept: INTERNAL MEDICINE | Facility: CLINIC | Age: 64
End: 2019-09-15

## 2019-10-02 ENCOUNTER — HEALTH MAINTENANCE LETTER (OUTPATIENT)
Age: 64
End: 2019-10-02

## 2019-10-04 ENCOUNTER — HOSPITAL ENCOUNTER (OUTPATIENT)
Facility: CLINIC | Age: 64
Discharge: HOME | End: 2019-10-04
Attending: INTERNAL MEDICINE | Admitting: INTERNAL MEDICINE
Payer: COMMERCIAL

## 2019-10-04 VITALS
RESPIRATION RATE: 16 BRPM | OXYGEN SATURATION: 96 % | SYSTOLIC BLOOD PRESSURE: 104 MMHG | HEART RATE: 77 BPM | DIASTOLIC BLOOD PRESSURE: 78 MMHG

## 2019-10-04 LAB — COLONOSCOPY: NORMAL

## 2019-10-04 PROCEDURE — 45378 DIAGNOSTIC COLONOSCOPY: CPT | Performed by: INTERNAL MEDICINE

## 2019-10-04 PROCEDURE — G0500 MOD SEDAT ENDO SERVICE >5YRS: HCPCS | Performed by: INTERNAL MEDICINE

## 2019-10-04 PROCEDURE — G0105 COLORECTAL SCRN; HI RISK IND: HCPCS | Performed by: INTERNAL MEDICINE

## 2019-10-04 PROCEDURE — 25000128 H RX IP 250 OP 636: Performed by: INTERNAL MEDICINE

## 2019-10-04 RX ORDER — LIDOCAINE 40 MG/G
CREAM TOPICAL
Status: DISCONTINUED | OUTPATIENT
Start: 2019-10-04 | End: 2019-10-04 | Stop reason: HOSPADM

## 2019-10-04 RX ORDER — NALOXONE HYDROCHLORIDE 0.4 MG/ML
.1-.4 INJECTION, SOLUTION INTRAMUSCULAR; INTRAVENOUS; SUBCUTANEOUS
Status: DISCONTINUED | OUTPATIENT
Start: 2019-10-04 | End: 2019-10-04 | Stop reason: HOSPADM

## 2019-10-04 RX ORDER — ONDANSETRON 2 MG/ML
4 INJECTION INTRAMUSCULAR; INTRAVENOUS EVERY 6 HOURS PRN
Status: DISCONTINUED | OUTPATIENT
Start: 2019-10-04 | End: 2019-10-04 | Stop reason: HOSPADM

## 2019-10-04 RX ORDER — FENTANYL CITRATE 50 UG/ML
INJECTION, SOLUTION INTRAMUSCULAR; INTRAVENOUS PRN
Status: DISCONTINUED | OUTPATIENT
Start: 2019-10-04 | End: 2019-10-04 | Stop reason: HOSPADM

## 2019-10-04 RX ORDER — ONDANSETRON 4 MG/1
4 TABLET, ORALLY DISINTEGRATING ORAL EVERY 6 HOURS PRN
Status: DISCONTINUED | OUTPATIENT
Start: 2019-10-04 | End: 2019-10-04 | Stop reason: HOSPADM

## 2019-10-04 RX ORDER — ONDANSETRON 2 MG/ML
4 INJECTION INTRAMUSCULAR; INTRAVENOUS
Status: DISCONTINUED | OUTPATIENT
Start: 2019-10-04 | End: 2019-10-04 | Stop reason: HOSPADM

## 2019-10-04 RX ORDER — FLUMAZENIL 0.1 MG/ML
0.2 INJECTION, SOLUTION INTRAVENOUS
Status: DISCONTINUED | OUTPATIENT
Start: 2019-10-04 | End: 2019-10-04 | Stop reason: HOSPADM

## 2019-10-04 NOTE — LETTER
September 13, 2019      Lety Bartolome Baldwin  [address] [place] [address]         Thank you for choosing [facility]. You are scheduled for the following service(s).   Please be aware that coverage of these services is subject to the terms and limitations of your health insurance plan.  Call member services at your health plan with any benefit or coverage questions.    Date:  Friday October 4, 2019             Procedure:  COLONOSCOPY  Doctor:        Dr Moran   Arrival Time:  0800  *Check in at Emergency/Endoscopy desk*  Procedure Time:  0830      Location:   [facility]        Endoscopy Department, First Floor (Enter through ER Doors) *        [address] [place], [place] [address] [phone] or [phone] ([facility]) to reschedule      MIRALAX -GATORADE  PREP  Colonoscopy is the most accurate test to detect colon polyps and colon cancer; and the only test where polyps can be removed. During this procedure, a doctor examines the lining of your large intestine and rectum through a flexible tube.   Transportation  You must arrange for a ride for the day of your procedure with a responsible adult. A taxi , Uber, etc, is not an option unless you are accompanied by a responsible adult. If you fail to arrange transportation with a responsible adult, your procedure will be cancelled and rescheduled.    Purchase the  following supplies at your local pharmacy:  - 2 (two) bisacodyl tablets: each tablet contains 5 mg.  (Dulcolax  laxative NOT Dulcolax  stool softener)   - 1 (one) 8.3 oz bottle of Polyethylene Glycol (PEG) 3350 Powder   (MiraLAX , Smooth LAX , ClearLAX  or equivalent)  - 64 oz Gatorade    Regular Gatorade, Gatorade G2 , Powerade , Powerade Zero  or Pedialyte  is acceptable. Red colored flavors are not allowed; all other colors (yellow, green, orange, purple and blue) are okay. It is also okay to buy two 2.12 oz packets of powdered Gatorade  that can be mixed with water to a total volume of 64 oz of liquid.  - 1 (one) 10 oz bottle of Magnesium Citrate (Red colored flavors are not allowed)  It is also okay for you to use a 0.5 oz package of powdered magnesium citrate (17 g) mixed with 10 oz of water.      PREPARATION FOR COLONOSCOPY    7 days before:    Discontinue fiber supplements and medications containing iron. This includes Metamucil  and Fibercon ; and multivitamins with iron.    3 days before:    Begin a low-fiber diet. A low-fiber diet helps making the cleanout more effective.     Examples of a low-fiber diet include (but are not limited to): white bread, white rice, pasta, crackers, fish, chicken, eggs, ground beef, creamy peanut butter, cooked/steamed/boiled vegetables, canned fruit, bananas, melons, milk, plain yogurt cheese, salad dressing and other condiments.     The following are not allowed on a low-fiber diet: seeds, nuts, popcorn, bran, whole wheat, corn, quinoa, raw fruits and vegetables, berries and dried fruit, beans and lentils.    For additional details on low-fiber diet, please refer to the table on the last page.    2 days before:    Continue the low-fiber diet.     Drink at least 8 glasses of water throughout the day.     Stop eating solid foods at 11:45 pm.  1.   2. 1 day before:    In the morning: begin a clear liquid diet (liquids you can see through).     Examples of a clear liquid diet include: water, clear broth or bouillon, Gatorade, Pedialyte or Powerade, carbonated and non-carbonated soft drinks (Sprite , 7-Up , ginger ale), strained fruit juices without pulp (apple, white grape, white cranberry), Jell-O  and popsicles.     The following are not allowed on a clear liquid diet: red liquids, alcoholic beverages, dairy products (milk, creamer, and yogurt), protein shakes, creamy broths, juice with pulp and chewing tobacco.    At noon: take 2 (two) bisacodyl tablets     At 4 (and no later than 6pm): start drinking the  Miralax-Gatorade preparation (8.3 oz of Miralax mixed with 64 oz of Gatorade in a large pitcher). Drink 1(one) 8 oz glass every 15 minutes thereafter, until the mixture is gone.    COLON CLEANSING TIPS: drink adequate amounts of fluids before and after your colon cleansing to prevent dehydration. Stay near a toilet because you will have diarrhea. Even if you are sitting on the toilet, continue to drink the cleansing solution every 15 minutes. If you feel nauseous or vomit, rinse your mouth with water, take a 15 to 30-minute-break and then continue drinking the solution. You will be uncomfortable until the stool has flushed from your colon (in about 2 to 4 hours). You may feel chilled.    Day of your procedure  You may take all of your morning medications including blood pressure medications, blood thinners (if you have not been instructed to stop these by our office), methadone, anti-seizure medications with sips of water 3 hours prior to your procedure or earlier. Do not take insulin or vitamins prior to your procedure. Continue the clear liquid diet.       4 hours prior: drink 10 oz of magnesium citrate. It may be easier to drink it with a straw.    STOP consuming all liquids after that.     Do not take anything by mouth during this time.     Allow extra time to travel to your procedure as you may need to stop and use a restroom along the way.    You are ready for the procedure, if you followed all instructions and your stool is no longer formed, but clear or yellow liquid. If you are unsure whether your colon is clean, please call our office at [phone] before you leave for your appointment.    Bring the following to your procedure:  - Insurance Card/Photo ID.   - List of current medications including over-the-counter medications and supplements.   - Your rescue inhaler if you currently use one to control asthma.      Canceling or rescheduling your appointment:   If you must cancel or reschedule your  appointment, please call [phone] as soon as possible.      COLONOSCOPY PRE-PROCEDURE CHECKLIST    If you have diabetes, ask your regular doctor for diet and medication restrictions.  If you take an anticoagulant or anti-platelet medication (such as Coumadin , Lovenox , Pradaxa , Xarelto , Eliquis , etc.), please call your primary doctor for advice on holding this medication.  If you take aspirin you may continue to do so.  If you are or may be pregnant, please discuss the risks and benefits of this procedure with your doctor.        What happens during a colonoscopy?    Plan to spend up to two hours, starting at registration time, at the endoscopy center the day of your procedure. The colonoscopy takes an average of 15 to 30 minutes. Recovery time is about 30 minutes.      Before the exam:    You will change into a gown.    Your medical history and medication list will be reviewed with you, unless that has been done over the phone prior to the procedure.     A nurse will insert an intravenous (IV) line into your hand or arm.    The doctor will meet with you and will give you a consent form to sign.  1. During the exam:     Medicine will be given through the IV line to help you relax.     Your heart rate and oxygen levels will be monitored. If your blood pressure is low, you may be given fluids through the IV line.     The doctor will insert a flexible hollow tube, called a colonoscope, into your rectum. The scope will be advanced slowly through the large intestine (colon).    You may have a feeling of fullness or pressure.     If an abnormal tissue or a polyp is found, the doctor may remove it through the endoscope for closer examination, or biopsy. Tissue removal is painless    After the exam:           Any tissue samples removed during the exam will be sent to a lab for evaluation. It may take 5-7 working days for you to be notified of the results.     A nurse will provide you with complete discharge  instructions before you leave the endoscopy center. Be sure to ask the nurse for specific instructions if you take blood thinners such as Aspirin, Coumadin or Plavix.     The doctor will prepare a full report for you and for the physician who referred you for the procedure.     Your doctor will talk with you about the initial results of your exam.      Medication given during the exam will prohibit you from driving for the rest of the day.     Following the exam, you may resume your normal diet. Your first meal should be light, no greasy foods. Avoid alcohol until the next day.     You may resume your regular activities the day after the procedure.         LOW-FIBER DIET    Foods RECOMMENDED Foods to AVOID   Breads, Cereal, Rice and Pasta:   White bread, rolls, biscuits, croissant and don toast.   Waffles, Kazakh toast and pancakes.   White rice, noodles, pasta, macaroni and peeled cooked potatoes.   Plain crackers and saltines.   Cooked cereals: farina, cream of rice.   Cold cereals: Puffed Rice , Rice Krispies , Corn Flakes  and Special K    Breads, Cereal, Rice and Pasta:   Breads or rolls with nuts, seeds or fruit.   Whole wheat, pumpernickel, rye breads and cornbread.   Potatoes with skin, brown or wild rice, and kasha (buckwheat).     Vegetables:   Tender cooked and canned vegetables without seeds: carrots, asparagus tips, green or wax beans, pumpkin, spinach, lima beans. Vegetables:   Raw or steamed vegetables.   Vegetables with seeds.   Sauerkraut.   Winter squash, peas, broccoli, Brussel sprouts, cabbage, onions, cauliflower, baked beans, peas and corn.   Fruits:   Strained fruit juice.   Canned fruit, except pineapple.   Ripe bananas and melon. Fruits:   Prunes and prune juice.   Raw fruits.   Dried fruits: figs, dates and raisins.   Milk/Dairy:   Milk: plain or flavored.   Yogurt, custard and ice cream.   Cheese and cottage cheese Milk/Dairy:     Meat and other proteins:   ground, well-cooked tender  beef, lamb, ham, veal, pork, fish, poultry and organ meats.   Eggs.   Peanut butter without nuts. Meat and other proteins:   Tough, fibrous meats with gristle.   Dry beans, peas and lentils.   Peanut butter with nuts.   Tofu.   Fats, Snack, Sweets, Condiments and Beverages:   Margarine, butter, oils, mayonnaise, sour cream and salad dressing, plain gravy.   Sugar, hard candy, clear jelly, honey and syrup.   Spices, cooked herbs, bouillon, broth and soups made with allowed vegetable, ketchup and mustard.   Coffee, tea and carbonated drinks.   Plain cakes, cookies and pretzels.   Gelatin, plain puddings, custard, ice cream, sherbet and popsicles. Fats, Snack, Sweets, Condiments and Beverages:   Nuts, seeds and coconut.   Jam, marmalade and preserves.   Pickles, olives, relish and horseradish.   All desserts containing nuts, seeds, dried fruit and coconut; or made from whole grains or bran.   Candy made with nuts or seeds.   Popcorn.                     DIRECTIONS TO THE ENDOSCOPY DEPARTMENT     From the north ([facility])  Take [address], exit on [address] [address]. Get into the left hand mirna, turn left (east), go one-half mile to [address] and turn left. Go north to the first stoplight, take a right on [address] and follow it to the Emergency entrance.    From the south ([facility])  Take [address]N to the [address] split and exit on [address] [address]. On [address] [address], turn left (west) to [address]. Turn right (north) on [address]. Go north to the first stoplight, take a right on [address] and follow it to the Emergency entrance.    From the east via 35[address] ([facility])  Take [address] south to [address] [address] exit. Turn right on [address] [address]. Go west to [address]. Turn right (north) on [address]. Go to the first stoplight, take a right and follow on [address] to the Emergency entrance.    From the east via [address] ([facility])  [address]  to [address]. Turn left (south) on [address] to [address]. Turn left (east) on [address] and follow it to the Emergency entrance.    From the west via [address] ([place], [place])  Take [address] east to [address]. Turn right (south) on [address] to [address]. Turn left (east) on [address] and follow it to the Emergency entrance.

## 2019-10-04 NOTE — H&P
Pre-Endoscopy History and Physical     Lety Baldwin MRN# [id number]   YOB: 1955 Age: 64 year old     Date of Procedure: 10/4/2019  Primary care provider: Ernestina Figueroa  Type of Endoscopy: Colonoscopy with possible biopsy, possible polypectomy  Reason for Procedure: screen  Type of Anesthesia Anticipated: Conscious Sedation    HPI:    Lety is a 64 year old female who will be undergoing the above procedure.      A history and physical has been performed. The patient's medications and allergies have been reviewed. The risks and benefits of the procedure and the sedation options and risks were discussed with the patient.  All questions were answered and informed consent was obtained.      She denies a personal or family history of anesthesia complications or bleeding disorders.     Patient Active Problem List   Diagnosis     Breast cancer (H)     Acquired hypothyroidism     Disorder of bone and cartilage     CARDIOVASCULAR SCREENING; LDL GOAL LESS THAN 130     Advanced directives, counseling/discussion        Past Medical History:   Diagnosis Date     Disorder of bone and cartilage, unspecified 2004    -2.0 in 2007     Fracture of metatarsal bone of right foot 5/15     Malignant neoplasm of breast (female), unspecified site 8/97    Ductal Carcinoma in situ right     Other specified acquired hypothyroidism      PONV (postoperative nausea and vomiting)         Past Surgical History:   Procedure Laterality Date     BREAST SURGERY  01/18/2018    bilat breast implant exchange     C NONSPECIFIC PROCEDURE  9/00/97    Right Total Mastectomy- Reconstructive Surgery. Abstracted 5/15/02.     CHOLECYSTECTOMY       COLONOSCOPY N/A 10/8/2014    Procedure: COLONOSCOPY;  Surgeon: Jelani Moran MD;  Location:  GI     ENDOSCOPIC RETROGRADE CHOLANGIOPANCREATOGRAM N/A 1/19/2018    Procedure: COMBINED ENDOSCOPIC RETROGRADE CHOLANGIOPANCREATOGRAPHY, SPHINCTEROTOMY;  ENDOSCOPIC RETROGRADE CHOLANGIOPANCREATOGRAM  (ERCP), SPHINCTEROTOMY, STONE EXTRACTION;  Surgeon: Patsy Mcclure MD;  Location:  OR     ENDOSCOPIC RETROGRADE CHOLANGIOPANCREATOGRAM N/A 1/19/2018    Procedure: COMBINED ENDOSCOPIC RETROGRADE CHOLANGIOPANCREATOGRAPHY, REMOVE STONE/BALLOON;;  Surgeon: Patsy Mcclure MD;  Location:  OR      BIOPSY OF BREAST, OPEN INCISIONAL  1992    Rt     HC DILATION/CURETTAGE DIAG/THER NON OB  1995    D & C     LAPAROSCOPIC CHOLECYSTECTOMY WITH CHOLANGIOGRAMS N/A 1/18/2018    Procedure: LAPAROSCOPIC CHOLECYSTECTOMY WITH CHOLANGIOGRAMS;;  Surgeon: Peter Winslow MD;  Location: [facility]     RECONSTRUCT BREAST BILATERAL, IMPLANT PROSTHESIS BILATERAL, COMBINED Bilateral 1/18/2018    Procedure: COMBINED RECONSTRUCT BREAST BILATERAL, IMPLANT PROSTHESIS BILATERAL;  BILATERAL BREAST RECONSTRUCTION WITH EXCHANGE OF GEL IMPLANTS AND CAPSULOTOMIES, LAPAROSCOPIC CHOLECYSTECTOMY WITH CHOLANGIOGRAMS;  Surgeon: Derrell Diaz MD;  Location: [facility]       Social History     Tobacco Use     Smoking status: Never Smoker     Smokeless tobacco: Never Used   Substance Use Topics     Alcohol use: No       Family History   Problem Relation Age of Onset     Diabetes Father      Emphysema Father      Osteoporosis Father      Thyroid Disease Father      Hypertension Mother      Alzheimer Disease Mother      Lipids Mother      Cancer - colorectal Mother      Colon Cancer Mother 78        Cause of Death     Hyperlipidemia Mother      Arthritis Maternal Grandmother      C.A.D. Maternal Grandmother      Cancer - colorectal Maternal Grandfather      Alzheimer Disease Maternal Grandfather      Colon Cancer Maternal Grandfather      Cerebrovascular Disease Paternal Grandmother      Osteoporosis Paternal Grandmother      Thyroid Disease Paternal Grandmother        Prior to Admission medications    Medication Sig Start Date End Date Taking? Authorizing Provider   levothyroxine (SYNTHROID/LEVOTHROID) 150 MCG tablet Take 1 tablet (150 mcg)  "by mouth daily 9/13/19  Yes Ernestina Figueroa MD   VIT CALCIUM CITRATE + D TABS 250-62.5 MG-IU OR    Yes    VITAMIN C 250 MG OR TABS 1 TABLET 3 TIMES DAILY  Patient taking differently: 1 TABLET  DAILY   Yes        Allergies   Allergen Reactions     Codeine Nausea     Fesoterodine      Dizziness, dry mouth        REVIEW OF SYSTEMS:   5 point ROS negative except as noted above in HPI, including Gen., Resp., CV, GI &  system review.    PHYSICAL EXAM:   [facility] 01/01/2004  Estimated body mass index is 24.95 kg/m  as calculated from the following:    Height as of 9/13/19: 1.695 m (5' 6.75\").    Weight as of 9/13/19: 71.7 kg (158 lb 1.6 oz).   GENERAL APPEARANCE: alert, and oriented  MENTAL STATUS: alert  AIRWAY EXAM: Mallampatti Class I (visualization of the soft palate, fauces, uvula, anterior and posterior pillars)  RESP: lungs clear to auscultation - no rales, rhonchi or wheezes  CV: regular rates and rhythm  DIAGNOSTICS:    Not indicated    IMPRESSION   ASA Class 2 - Mild systemic disease    PLAN:   Plan for Colonoscopy with possible biopsy, possible polypectomy. We discussed the risks, benefits and alternatives and the patient wished to proceed.    The above has been forwarded to the consulting provider.      Signed Electronically by: Jelani Moran MD  October 4, 2019          "

## 2019-10-24 ENCOUNTER — OFFICE VISIT (OUTPATIENT)
Dept: INTERNAL MEDICINE | Facility: CLINIC | Age: 64
End: 2019-10-24
Payer: COMMERCIAL

## 2019-10-24 VITALS
SYSTOLIC BLOOD PRESSURE: 115 MMHG | WEIGHT: 158.7 LBS | TEMPERATURE: 98 F | HEIGHT: 67 IN | DIASTOLIC BLOOD PRESSURE: 76 MMHG | OXYGEN SATURATION: 99 % | RESPIRATION RATE: 18 BRPM | HEART RATE: 95 BPM | BODY MASS INDEX: 24.91 KG/M2

## 2019-10-24 DIAGNOSIS — Z98.82 BREAST IMPLANT STATUS: ICD-10-CM

## 2019-10-24 DIAGNOSIS — C50.911 MALIGNANT NEOPLASM OF RIGHT FEMALE BREAST, UNSPECIFIED ESTROGEN RECEPTOR STATUS, UNSPECIFIED SITE OF BREAST (H): ICD-10-CM

## 2019-10-24 DIAGNOSIS — Z01.818 PREOP GENERAL PHYSICAL EXAM: Primary | ICD-10-CM

## 2019-10-24 PROCEDURE — 93000 ELECTROCARDIOGRAM COMPLETE: CPT | Performed by: INTERNAL MEDICINE

## 2019-10-24 PROCEDURE — 99214 OFFICE O/P EST MOD 30 MIN: CPT | Performed by: INTERNAL MEDICINE

## 2019-10-24 ASSESSMENT — MIFFLIN-ST. JEOR: SCORE: 1303.52

## 2019-10-24 NOTE — PROGRESS NOTES
[place] [address]  [facility] [address]  [phone]  Dept: [phone]    PRE-OP EVALUATION:  Today's date: 10/24/2019    Lety Baldwin (: 1955) presents for pre-operative evaluation assessment as requested by Dr. Shashi Dumont.  She requires evaluation and anesthesia risk assessment prior to undergoing surgery/procedure for treatment of breat implant exchange .    Fax number for surgical facility: [facility]  Primary Physician: Ernestina Figueroa  Type of Anesthesia Anticipated: General    Patient has a Health Care Directive or Living Will:  NO    Preop Questions 10/24/2019   Who is doing your surgery? doctor shashi dumont   What are you having done? breast implant change   Date of Surgery/Procedure: 2019 @ 1 pm   Facility or Hospital where procedure/surgery will be performed: [facility]   1.  Do you have a history of Heart attack, stroke, stent, coronary bypass surgery, or other heart surgery? No   2.  Do you ever have any pain or discomfort in your chest? No   3.  Do you have a history of  Heart Failure? No   4.   Are you troubled by shortness of breath when:  walking on a level surface, or up a slight hill, or at night? No   5.  Do you currently have a cold, bronchitis or other respiratory infection? No   6.  Do you have a cough, shortness of breath, or wheezing? No   7.  Do you sometimes get pains in the calves of your legs when you walk? No   8. Do you or anyone in your family have previous history of blood clots? No   9.  Do you or does anyone in your family have a serious bleeding problem such as prolonged bleeding following surgeries or cuts? No   10. Have you ever had problems with anemia or been told to take iron pills? No   11. Have you had any abnormal blood loss such as black, tarry or bloody stools, or abnormal vaginal bleeding? No   12. Have you ever had a blood transfusion? No   13. Have you or any of your relatives ever  had problems with anesthesia? No   14. Do you have sleep apnea, excessive snoring or daytime drowsiness? No   15. Do you have any prosthetic heart valves? No   16. Do you have prosthetic joints? No   17. Is there any chance that you may be pregnant? No         HPI:     HPI related to upcoming procedure: She has had breast implants due to breast cancer surgery  and the implants have been recalled so she will undergo replacement.      See problem list for active medical problems.  Problems all longstanding and stable, except as noted/documented.  See ROS for pertinent symptoms related to these conditions.      MEDICAL HISTORY:     Patient Active Problem List    Diagnosis Date Noted     Advanced directives, counseling/discussion 09/28/2015     Priority: Medium     Information given to pt to take home and review. 9/28/15       CARDIOVASCULAR SCREENING; LDL GOAL LESS THAN 130 10/31/2010     Priority: Medium     Disorder of bone and cartilage      Priority: Medium     -2.0 in 2007  Problem list name updated by automated process. Provider to review       Breast cancer (H) 08/05/2003     Priority: Medium     Acquired hypothyroidism 08/05/2003     Priority: Medium     Problem list name updated by automated process. Provider to review        Past Medical History:   Diagnosis Date     Disorder of bone and cartilage, unspecified 2004    -2.0 in 2007     Fracture of metatarsal bone of right foot 5/15     Malignant neoplasm of breast (female), unspecified site 8/97    Ductal Carcinoma in situ right     Other specified acquired hypothyroidism      PONV (postoperative nausea and vomiting)      Past Surgical History:   Procedure Laterality Date     BREAST SURGERY  01/18/2018    bilat breast implant exchange     C NONSPECIFIC PROCEDURE  9/00/97    Right Total Mastectomy- Reconstructive Surgery. Abstracted 5/15/02.     CHOLECYSTECTOMY       COLONOSCOPY N/A 10/8/2014    Procedure: COLONOSCOPY;  Surgeon: Jelani Moran MD;   Location:  GI     COLONOSCOPY N/A 10/4/2019    Procedure: COLONOSCOPY (fv);  Surgeon: Jelani Moran MD;  Location:  GI     ENDOSCOPIC RETROGRADE CHOLANGIOPANCREATOGRAM N/A 1/19/2018    Procedure: COMBINED ENDOSCOPIC RETROGRADE CHOLANGIOPANCREATOGRAPHY, SPHINCTEROTOMY;  ENDOSCOPIC RETROGRADE CHOLANGIOPANCREATOGRAM (ERCP), SPHINCTEROTOMY, STONE EXTRACTION;  Surgeon: Patsy Mcclure MD;  Location:  OR     ENDOSCOPIC RETROGRADE CHOLANGIOPANCREATOGRAM N/A 1/19/2018    Procedure: COMBINED ENDOSCOPIC RETROGRADE CHOLANGIOPANCREATOGRAPHY, REMOVE STONE/BALLOON;;  Surgeon: Patsy Mcclure MD;  Location: [facility] OR     HC BIOPSY OF BREAST, OPEN INCISIONAL  1992    Rt     HC DILATION/CURETTAGE DIAG/THER NON OB  1995    D & C     LAPAROSCOPIC CHOLECYSTECTOMY WITH CHOLANGIOGRAMS N/A 1/18/2018    Procedure: LAPAROSCOPIC CHOLECYSTECTOMY WITH CHOLANGIOGRAMS;;  Surgeon: Peter Winslow MD;  Location: [facility]     RECONSTRUCT BREAST BILATERAL, IMPLANT PROSTHESIS BILATERAL, COMBINED Bilateral 1/18/2018    Procedure: COMBINED RECONSTRUCT BREAST BILATERAL, IMPLANT PROSTHESIS BILATERAL;  BILATERAL BREAST RECONSTRUCTION WITH EXCHANGE OF GEL IMPLANTS AND CAPSULOTOMIES, LAPAROSCOPIC CHOLECYSTECTOMY WITH CHOLANGIOGRAMS;  Surgeon: Derrell Diaz MD;  Location: [facility]     Current Outpatient Medications   Medication Sig Dispense Refill     levothyroxine (SYNTHROID/LEVOTHROID) 150 MCG tablet Take 1 tablet (150 mcg) by mouth daily 90 tablet 3     VIT CALCIUM CITRATE + D TABS 250-62.5 MG-IU OR  (Patient taking differently: 1 tablet daily)       VITAMIN C 250 MG OR TABS 1 TABLET 3 TIMES DAILY (Patient taking differently: 1 TABLET  DAILY)       OTC products: None, except as noted above    Allergies   Allergen Reactions     Codeine Nausea     Fesoterodine      Dizziness, dry mouth      Latex Allergy: NO    Social History     Tobacco Use     Smoking status: Never Smoker     Smokeless tobacco: Never Used   Substance Use Topics  "    Alcohol use: No     History   Drug Use No       REVIEW OF SYSTEMS:   GENERAL: negative for, fever, chills, weight loss, weight gain  EYES: negative  ENT: negative  RESPIRATORY: No dyspnea on exertion and No cough  CARDIOVASCULAR: negative for, palpitations, tachycardia, irregular heart beat and chest pain  GI: negative for, nausea, vomiting, abdominal pain, melena and hematochezia  : negative  MUSCULOSKELETAL: negative  NEUROLOGIC: negative, headaches, seizures, local weakness, numbness or tingling of hands and numbness or tingling of feet  SKIN: negative  ENDOCRINE: negative         EXAM:       Patient is alert, oriented, cooperative in no acute distress.  /76 (BP Location: Right arm, Patient Position: Sitting, Cuff Size: Adult Regular)   Pulse 95   Temp 98  F (36.7  C) (Oral)   Resp 18   Ht 1.695 m (5' 6.75\")   Wt 72 kg (158 lb 11.2 oz)   LMP 01/01/2004   SpO2 99%   BMI 25.04 kg/m      HEENT: PERRL, EOMI, TM's are normal. Oropharynx is clear.  NECK: No lymphadenopathy or thyromegaly. Carotid pulses full without bruits.  LUNGS: clear  CV: normal S1, S2 without murmur, S3 or S4 present. Pulses are 2/2 throughout. No JVD.  ABDOMEN: Bowel sounds present, nontender without hepatosplenomegaly. Liver is normal size to percussion.  EXTREMITIES: no edema present, unremarkable joints  NEUROLOGIC: Cranial nerves II-XII intact, reflexes 2/4 throughout, strength 5/5, sensation grossly intact, gait normal.  SKIN: without rashes or significant lesions     DIAGNOSTICS:   EKG: appears normal, NSR, normal axis, IRBBB, no acute ST/T changes c/w ischemia, no LVH by voltage criteria, unchanged from previous tracings    Recent Labs   Lab Test 09/13/19  1005 10/04/18  0926 01/19/18  1120 01/19/18  0753   HGB  --   --   --  13.6   INR  --   --  1.11  --     137  --   --    POTASSIUM 4.3 4.3  --   --    CR 0.68 0.71  --   --         IMPRESSION:   Reason for surgery/procedure: Need to replace " implants  Diagnosis/reason for consult: preop    The proposed surgical procedure is considered LOW risk.    REVISED CARDIAC RISK INDEX  The patient has the following serious cardiovascular risks for perioperative complications such as (MI, PE, VFib and 3  AV Block):  No serious cardiac risks  INTERPRETATION: 0 risks: Class I (very low risk - 0.4% complication rate)    The patient has the following additional risks for perioperative complications:  No identified additional risks      ICD-10-CM    1. Preop general physical exam Z01.818 EKG 12-lead complete w/read - Clinics   2. Breast implant status Z98.82    3. Malignant neoplasm of right female breast, unspecified estrogen receptor status, unspecified site of breast (H) C50.911        RECOMMENDATIONS:         --Patient is to take all scheduled medications on the day of surgery EXCEPT for modifications listed below.  May take medications    APPROVAL GIVEN to proceed with proposed procedure, without further diagnostic evaluation       Signed Electronically by: Ernestina Figueroa MD    Copy of this evaluation report is provided to requesting physician.    [facility] Preop Guidelines    Revised Cardiac Risk Index

## 2019-10-24 NOTE — NURSING NOTE
"/76 (BP Location: Right arm, Patient Position: Sitting, Cuff Size: Adult Regular)   Pulse 95   Temp 98  F (36.7  C) (Oral)   Resp 18   Ht 1.695 m (5' 6.75\")   Wt 72 kg (158 lb 11.2 oz)   LMP 01/01/2004   SpO2 99%   BMI 25.04 kg/m     11/07/2019 Dr. Derrell Diaz @ [facility] implant exchange  "

## 2019-11-07 ENCOUNTER — ANESTHESIA (OUTPATIENT)
Dept: SURGERY | Facility: CLINIC | Age: 64
End: 2019-11-07
Payer: COMMERCIAL

## 2019-11-07 ENCOUNTER — HOSPITAL ENCOUNTER (OUTPATIENT)
Facility: CLINIC | Age: 64
Discharge: HOME | End: 2019-11-07
Attending: PLASTIC SURGERY | Admitting: PLASTIC SURGERY
Payer: COMMERCIAL

## 2019-11-07 ENCOUNTER — ANESTHESIA EVENT (OUTPATIENT)
Dept: SURGERY | Facility: CLINIC | Age: 64
End: 2019-11-07
Payer: COMMERCIAL

## 2019-11-07 VITALS
DIASTOLIC BLOOD PRESSURE: 80 MMHG | HEART RATE: 71 BPM | RESPIRATION RATE: 16 BRPM | OXYGEN SATURATION: 100 % | SYSTOLIC BLOOD PRESSURE: 125 MMHG | HEIGHT: 67 IN | TEMPERATURE: 96.8 F | WEIGHT: 156.2 LBS | BODY MASS INDEX: 24.52 KG/M2

## 2019-11-07 DIAGNOSIS — Z98.890 S/P BREAST RECONSTRUCTION, RIGHT: Primary | ICD-10-CM

## 2019-11-07 PROCEDURE — 37000008 ZZH ANESTHESIA TECHNICAL FEE, 1ST 30 MIN: Performed by: PLASTIC SURGERY

## 2019-11-07 PROCEDURE — 36000058 ZZH SURGERY LEVEL 3 EA 15 ADDTL MIN: Performed by: PLASTIC SURGERY

## 2019-11-07 PROCEDURE — 25000125 ZZHC RX 250: Performed by: ANESTHESIOLOGY

## 2019-11-07 PROCEDURE — 36000056 ZZH SURGERY LEVEL 3 1ST 30 MIN: Performed by: PLASTIC SURGERY

## 2019-11-07 PROCEDURE — 27210794 ZZH OR GENERAL SUPPLY STERILE: Performed by: PLASTIC SURGERY

## 2019-11-07 PROCEDURE — 25000125 ZZHC RX 250: Performed by: NURSE ANESTHETIST, CERTIFIED REGISTERED

## 2019-11-07 PROCEDURE — 37000009 ZZH ANESTHESIA TECHNICAL FEE, EACH ADDTL 15 MIN: Performed by: PLASTIC SURGERY

## 2019-11-07 PROCEDURE — 88300 SURGICAL PATH GROSS: CPT | Mod: 26 | Performed by: PLASTIC SURGERY

## 2019-11-07 PROCEDURE — 71000012 ZZH RECOVERY PHASE 1 LEVEL 1 FIRST HR: Performed by: PLASTIC SURGERY

## 2019-11-07 PROCEDURE — L8600 IMPLANT BREAST SILICONE/EQ: HCPCS | Performed by: PLASTIC SURGERY

## 2019-11-07 PROCEDURE — 40000170 ZZH STATISTIC PRE-PROCEDURE ASSESSMENT II: Performed by: PLASTIC SURGERY

## 2019-11-07 PROCEDURE — 71000027 ZZH RECOVERY PHASE 2 EACH 15 MINS: Performed by: PLASTIC SURGERY

## 2019-11-07 PROCEDURE — 88300 SURGICAL PATH GROSS: CPT | Performed by: PLASTIC SURGERY

## 2019-11-07 PROCEDURE — 25000128 H RX IP 250 OP 636: Performed by: PLASTIC SURGERY

## 2019-11-07 PROCEDURE — 25000128 H RX IP 250 OP 636: Performed by: NURSE ANESTHETIST, CERTIFIED REGISTERED

## 2019-11-07 PROCEDURE — 25800030 ZZH RX IP 258 OP 636: Performed by: NURSE ANESTHETIST, CERTIFIED REGISTERED

## 2019-11-07 PROCEDURE — 25000125 ZZHC RX 250: Performed by: PLASTIC SURGERY

## 2019-11-07 PROCEDURE — 25000566 ZZH SEVOFLURANE, EA 15 MIN: Performed by: PLASTIC SURGERY

## 2019-11-07 DEVICE — IMPLANTABLE DEVICE: Type: IMPLANTABLE DEVICE | Site: BREAST | Status: FUNCTIONAL

## 2019-11-07 RX ORDER — SCOLOPAMINE TRANSDERMAL SYSTEM 1 MG/1
1 PATCH, EXTENDED RELEASE TRANSDERMAL ONCE
Status: COMPLETED | OUTPATIENT
Start: 2019-11-07 | End: 2019-11-07

## 2019-11-07 RX ORDER — CEPHALEXIN 500 MG/1
500 CAPSULE ORAL 3 TIMES DAILY
Qty: 9 CAPSULE | Refills: 0 | Status: SHIPPED | OUTPATIENT
Start: 2019-11-07 | End: 2019-11-10

## 2019-11-07 RX ORDER — ONDANSETRON 4 MG/1
4 TABLET, ORALLY DISINTEGRATING ORAL
Status: CANCELLED | OUTPATIENT
Start: 2019-11-07

## 2019-11-07 RX ORDER — PROPOFOL 10 MG/ML
INJECTION, EMULSION INTRAVENOUS CONTINUOUS PRN
Status: DISCONTINUED | OUTPATIENT
Start: 2019-11-07 | End: 2019-11-07

## 2019-11-07 RX ORDER — ONDANSETRON 2 MG/ML
INJECTION INTRAMUSCULAR; INTRAVENOUS PRN
Status: DISCONTINUED | OUTPATIENT
Start: 2019-11-07 | End: 2019-11-07

## 2019-11-07 RX ORDER — CEFAZOLIN SODIUM 1 G/3ML
1 INJECTION, POWDER, FOR SOLUTION INTRAMUSCULAR; INTRAVENOUS SEE ADMIN INSTRUCTIONS
Status: DISCONTINUED | OUTPATIENT
Start: 2019-11-07 | End: 2019-11-07 | Stop reason: HOSPADM

## 2019-11-07 RX ORDER — HYDROCODONE BITARTRATE AND ACETAMINOPHEN 5; 325 MG/1; MG/1
1 TABLET ORAL
Status: CANCELLED | OUTPATIENT
Start: 2019-11-07

## 2019-11-07 RX ORDER — NALOXONE HYDROCHLORIDE 0.4 MG/ML
.1-.4 INJECTION, SOLUTION INTRAMUSCULAR; INTRAVENOUS; SUBCUTANEOUS
Status: CANCELLED | OUTPATIENT
Start: 2019-11-07 | End: 2019-11-08

## 2019-11-07 RX ORDER — CELECOXIB 200 MG/1
200 CAPSULE ORAL
Status: CANCELLED | OUTPATIENT
Start: 2019-11-07

## 2019-11-07 RX ORDER — FENTANYL CITRATE 50 UG/ML
INJECTION, SOLUTION INTRAMUSCULAR; INTRAVENOUS PRN
Status: DISCONTINUED | OUTPATIENT
Start: 2019-11-07 | End: 2019-11-07

## 2019-11-07 RX ORDER — LIDOCAINE HYDROCHLORIDE 20 MG/ML
INJECTION, SOLUTION INFILTRATION; PERINEURAL PRN
Status: DISCONTINUED | OUTPATIENT
Start: 2019-11-07 | End: 2019-11-07

## 2019-11-07 RX ORDER — SODIUM CHLORIDE, SODIUM LACTATE, POTASSIUM CHLORIDE, CALCIUM CHLORIDE 600; 310; 30; 20 MG/100ML; MG/100ML; MG/100ML; MG/100ML
INJECTION, SOLUTION INTRAVENOUS CONTINUOUS PRN
Status: DISCONTINUED | OUTPATIENT
Start: 2019-11-07 | End: 2019-11-07

## 2019-11-07 RX ORDER — HYDROMORPHONE HYDROCHLORIDE 1 MG/ML
.3-.5 INJECTION, SOLUTION INTRAMUSCULAR; INTRAVENOUS; SUBCUTANEOUS EVERY 5 MIN PRN
Status: CANCELLED | OUTPATIENT
Start: 2019-11-07

## 2019-11-07 RX ORDER — DEXAMETHASONE SODIUM PHOSPHATE 4 MG/ML
INJECTION, SOLUTION INTRA-ARTICULAR; INTRALESIONAL; INTRAMUSCULAR; INTRAVENOUS; SOFT TISSUE PRN
Status: DISCONTINUED | OUTPATIENT
Start: 2019-11-07 | End: 2019-11-07

## 2019-11-07 RX ORDER — FENTANYL CITRATE 50 UG/ML
25-50 INJECTION, SOLUTION INTRAMUSCULAR; INTRAVENOUS
Status: CANCELLED | OUTPATIENT
Start: 2019-11-07

## 2019-11-07 RX ORDER — HYDROCODONE BITARTRATE AND ACETAMINOPHEN 5; 325 MG/1; MG/1
1-2 TABLET ORAL EVERY 4 HOURS PRN
Qty: 25 TABLET | Refills: 0 | Status: SHIPPED | OUTPATIENT
Start: 2019-11-07 | End: 2020-09-25

## 2019-11-07 RX ORDER — CEFAZOLIN SODIUM 2 G/100ML
2 INJECTION, SOLUTION INTRAVENOUS
Status: COMPLETED | OUTPATIENT
Start: 2019-11-07 | End: 2019-11-07

## 2019-11-07 RX ORDER — SODIUM CHLORIDE, SODIUM LACTATE, POTASSIUM CHLORIDE, CALCIUM CHLORIDE 600; 310; 30; 20 MG/100ML; MG/100ML; MG/100ML; MG/100ML
INJECTION, SOLUTION INTRAVENOUS CONTINUOUS
Status: CANCELLED | OUTPATIENT
Start: 2019-11-07

## 2019-11-07 RX ORDER — ONDANSETRON 2 MG/ML
4 INJECTION INTRAMUSCULAR; INTRAVENOUS EVERY 30 MIN PRN
Status: CANCELLED | OUTPATIENT
Start: 2019-11-07

## 2019-11-07 RX ORDER — ONDANSETRON 4 MG/1
4 TABLET, ORALLY DISINTEGRATING ORAL EVERY 30 MIN PRN
Status: CANCELLED | OUTPATIENT
Start: 2019-11-07

## 2019-11-07 RX ORDER — BUPIVACAINE HYDROCHLORIDE AND EPINEPHRINE 2.5; 5 MG/ML; UG/ML
INJECTION, SOLUTION EPIDURAL; INFILTRATION; INTRACAUDAL; PERINEURAL PRN
Status: DISCONTINUED | OUTPATIENT
Start: 2019-11-07 | End: 2019-11-07 | Stop reason: HOSPADM

## 2019-11-07 RX ORDER — SODIUM CHLORIDE, SODIUM LACTATE, POTASSIUM CHLORIDE, CALCIUM CHLORIDE 600; 310; 30; 20 MG/100ML; MG/100ML; MG/100ML; MG/100ML
INJECTION, SOLUTION INTRAVENOUS CONTINUOUS
Status: DISCONTINUED | OUTPATIENT
Start: 2019-11-07 | End: 2019-11-07 | Stop reason: HOSPADM

## 2019-11-07 RX ORDER — PROPOFOL 10 MG/ML
INJECTION, EMULSION INTRAVENOUS PRN
Status: DISCONTINUED | OUTPATIENT
Start: 2019-11-07 | End: 2019-11-07

## 2019-11-07 RX ADMIN — PHENYLEPHRINE HYDROCHLORIDE 100 MCG: 10 INJECTION INTRAVENOUS at 14:05

## 2019-11-07 RX ADMIN — PHENYLEPHRINE HYDROCHLORIDE 100 MCG: 10 INJECTION INTRAVENOUS at 13:47

## 2019-11-07 RX ADMIN — PHENYLEPHRINE HYDROCHLORIDE 100 MCG: 10 INJECTION INTRAVENOUS at 14:00

## 2019-11-07 RX ADMIN — FENTANYL CITRATE 50 MCG: 50 INJECTION, SOLUTION INTRAMUSCULAR; INTRAVENOUS at 13:16

## 2019-11-07 RX ADMIN — SCOPALAMINE 1 PATCH: 1 PATCH, EXTENDED RELEASE TRANSDERMAL at 12:39

## 2019-11-07 RX ADMIN — PHENYLEPHRINE HYDROCHLORIDE 100 MCG: 10 INJECTION INTRAVENOUS at 13:56

## 2019-11-07 RX ADMIN — PROPOFOL 200 MG: 10 INJECTION, EMULSION INTRAVENOUS at 13:18

## 2019-11-07 RX ADMIN — PHENYLEPHRINE HYDROCHLORIDE 200 MCG: 10 INJECTION INTRAVENOUS at 13:49

## 2019-11-07 RX ADMIN — PROPOFOL 50 MCG/KG/MIN: 10 INJECTION, EMULSION INTRAVENOUS at 13:19

## 2019-11-07 RX ADMIN — CEFAZOLIN SODIUM 2 G: 2 INJECTION, SOLUTION INTRAVENOUS at 13:23

## 2019-11-07 RX ADMIN — SODIUM CHLORIDE, POTASSIUM CHLORIDE, SODIUM LACTATE AND CALCIUM CHLORIDE: 600; 310; 30; 20 INJECTION, SOLUTION INTRAVENOUS at 13:12

## 2019-11-07 RX ADMIN — PHENYLEPHRINE HYDROCHLORIDE 100 MCG: 10 INJECTION INTRAVENOUS at 13:44

## 2019-11-07 RX ADMIN — LIDOCAINE HYDROCHLORIDE 100 MG: 20 INJECTION, SOLUTION INFILTRATION; PERINEURAL at 13:16

## 2019-11-07 RX ADMIN — MIDAZOLAM 2 MG: 1 INJECTION INTRAMUSCULAR; INTRAVENOUS at 13:12

## 2019-11-07 RX ADMIN — DEXAMETHASONE SODIUM PHOSPHATE 4 MG: 4 INJECTION, SOLUTION INTRA-ARTICULAR; INTRALESIONAL; INTRAMUSCULAR; INTRAVENOUS; SOFT TISSUE at 13:22

## 2019-11-07 RX ADMIN — LIDOCAINE HYDROCHLORIDE 1 ML: 10 INJECTION, SOLUTION EPIDURAL; INFILTRATION; INTRACAUDAL; PERINEURAL at 12:27

## 2019-11-07 RX ADMIN — PHENYLEPHRINE HYDROCHLORIDE 100 MCG: 10 INJECTION INTRAVENOUS at 13:23

## 2019-11-07 RX ADMIN — ONDANSETRON 4 MG: 2 INJECTION INTRAMUSCULAR; INTRAVENOUS at 13:53

## 2019-11-07 RX ADMIN — FENTANYL CITRATE 50 MCG: 50 INJECTION, SOLUTION INTRAMUSCULAR; INTRAVENOUS at 13:30

## 2019-11-07 ASSESSMENT — ENCOUNTER SYMPTOMS: SEIZURES: 0

## 2019-11-07 ASSESSMENT — MIFFLIN-ST. JEOR: SCORE: 1296.15

## 2019-11-07 ASSESSMENT — LIFESTYLE VARIABLES: TOBACCO_USE: 0

## 2019-11-07 NOTE — OP NOTE
Procedure Date: 11/07/2019      PREOPERATIVE DIAGNOSES:   1.  History of right breast carcinoma, status post right-sided mastectomy and reconstruction.   2.  Breast asymmetry with mild capsular contracture of right breast.      POSTOPERATIVE DIAGNOSES:   1.  History of right breast carcinoma, status post right-sided mastectomy and reconstruction.   2.  Breast asymmetry with mild capsular contracture of right breast.      PROCEDURES:  Revision of right breast reconstruction with implant exchange and capsulotomy.       SURGEON:  Derrell Diaz MD      ANESTHESIA:  General.      INDICATIONS:  The patient is a 64-year-old female who has a history of right-sided breast carcinoma many years ago.  She had implant reconstruction and has overall done well.  She at this point has some breast asymmetry and has a moderate capsular contracture, essentially a grade 3, on the right side.  She would also benefit from a bit more volume on the right-sided implant.  She also has a shaped textured surface implant on the right side and she has learned of the risk of ALCL and would rather not have a textured implant in place and would rather go with a smooth implant.  We therefore discussed using a slightly larger round smooth surfaced silicone gel implant.  She agrees with the plan and presents now for surgery.      DESCRIPTION OF PROCEDURE:  The patient was first marked preoperatively in the upright position.  She agreed with the areas that were marked.  She was then taken to the operating room and placed in supine position on the operating table.  General anesthesia was administered without complications.  Pneumatic compression boots were also applied to lower extremities.  The chest was prepped and draped in a sterile fashion.  We first opened some of the old mastectomy scar in the lateral breast by excising a small amount of the scar for a total length of about 4 cm.  We then dissected down to the implant capsule, which was opened.   The implant was noted to be completely intact and it was removed without difficulty and was sent to Pathology for gross.  There were no abnormalities in the pocket whatsoever with no fluid or any sort of nodularity within the capsule.  We then did a capsulotomy along the superior aspect of the pocket and also along the inferomedial aspect as had been determined preoperatively with marking.  Once the capsule was adequately released and hemostasis was achieved, we then infiltrated with a total of 30 mL of 0.25% Marcaine with epinephrine and irrigated with antibiotic saline solution.  After confirming hemostasis, we then tried sizers.  We determined that the Allergan  mL implant seemed to create the best aesthetics and symmetry.  We therefore prepared this implant.  We again irrigated with some dilute Betadine irrigation and prepped the skin surface with Betadine, then we placed the permanent implant.  We smoothed it nicely into the pocket and closure was then done with a deep running stitch of 2-0 Vicryl as well as some deep dermal suture, then a running subcuticular suture of 3-0 Monocryl.  Steri-Strips were applied.  The patient was placed into a dressing with gauze and an Ace wrap.  She tolerated the surgery well.        ESTIMATED BLOOD LOSS:  5 mL.      All sponge and instrument counts were correct.  The patient was taken to the recovery room awake and in good condition.         LOUISE THOMAS MD             D: 2019   T: 2019   MT: MARC      Name:     SPENCER TOBIAS   MRN:      [id number]        Account:        [id number]   :      1955           Procedure Date: 2019      Document: [id number]

## 2019-11-07 NOTE — ANESTHESIA POSTPROCEDURE EVALUATION
Patient: Lety Baldwin    Procedure(s):  REVISION OF RIGHT BREAST RECONSTRUCTION WITH IMPLANT EXCHANGE WITH CAPSULOTOMIES    Diagnosis:BREAST RECONSTRUCTION  Diagnosis Additional Information: No value filed.    Anesthesia Type:  General, LMA    Note:  Anesthesia Post Evaluation    Patient location during evaluation: PACU  Patient participation: Able to fully participate in evaluation  Level of consciousness: awake  Pain management: adequate  Airway patency: patent  Cardiovascular status: acceptable  Respiratory status: acceptable  Hydration status: acceptable  PONV: none     Anesthetic complications: None          Last vitals:  Vitals:    11/07/19 1132 11/07/19 1416 11/07/19 1445   BP: 131/80 117/80 129/86   Pulse: 78 75 78   Resp: 18  16   Temp: 36  C (96.8  F)     SpO2: 99% 100% 100%         Electronically Signed By: Keesha Siddiqi  November 7, 2019  2:57 PM

## 2019-11-07 NOTE — ANESTHESIA PREPROCEDURE EVALUATION
Anesthesia Pre-Procedure Evaluation    Patient: Lety Baldwin   MRN: [id number] : 1955          Preoperative Diagnosis: BREAST RECONSTRUCTION    Procedure(s):  RIGHT BREAST IMPLANT EXCHANGE RECONSTRUCTIVE WITH CAPSULOTOMIES    Past Medical History:   Diagnosis Date     Disorder of bone and cartilage, unspecified 2004    -2.0 in      Fracture of metatarsal bone of right foot 5/15     Malignant neoplasm of breast (female), unspecified site     Ductal Carcinoma in situ right     Other specified acquired hypothyroidism      PONV (postoperative nausea and vomiting)      Past Surgical History:   Procedure Laterality Date     BREAST SURGERY  2018    bilat breast implant exchange     C NONSPECIFIC PROCEDURE      Right Total Mastectomy- Reconstructive Surgery. Abstracted 5/15/02.     CHOLECYSTECTOMY       COLONOSCOPY N/A 10/8/2014    Procedure: COLONOSCOPY;  Surgeon: Jelani Moran MD;  Location:  GI     COLONOSCOPY N/A 10/4/2019    Procedure: COLONOSCOPY (fv);  Surgeon: Jelani Moran MD;  Location:  GI     ENDOSCOPIC RETROGRADE CHOLANGIOPANCREATOGRAM N/A 2018    Procedure: COMBINED ENDOSCOPIC RETROGRADE CHOLANGIOPANCREATOGRAPHY, SPHINCTEROTOMY;  ENDOSCOPIC RETROGRADE CHOLANGIOPANCREATOGRAM (ERCP), SPHINCTEROTOMY, STONE EXTRACTION;  Surgeon: Patsy Mcclure MD;  Location:  OR     ENDOSCOPIC RETROGRADE CHOLANGIOPANCREATOGRAM N/A 2018    Procedure: COMBINED ENDOSCOPIC RETROGRADE CHOLANGIOPANCREATOGRAPHY, REMOVE STONE/BALLOON;;  Surgeon: Patsy Mcclure MD;  Location: [facility] OR     HC BIOPSY OF BREAST, OPEN INCISIONAL  1992    Rt     HC DILATION/CURETTAGE DIAG/THER NON OB      D & C     LAPAROSCOPIC CHOLECYSTECTOMY WITH CHOLANGIOGRAMS N/A 2018    Procedure: LAPAROSCOPIC CHOLECYSTECTOMY WITH CHOLANGIOGRAMS;;  Surgeon: Peter Winslow MD;  Location:  [facility]     RECONSTRUCT BREAST BILATERAL, IMPLANT PROSTHESIS BILATERAL, COMBINED Bilateral 2018     Procedure: COMBINED RECONSTRUCT BREAST BILATERAL, IMPLANT PROSTHESIS BILATERAL;  BILATERAL BREAST RECONSTRUCTION WITH EXCHANGE OF GEL IMPLANTS AND CAPSULOTOMIES, LAPAROSCOPIC CHOLECYSTECTOMY WITH CHOLANGIOGRAMS;  Surgeon: Derrell Diaz MD;  Location: [facility]       Anesthesia Evaluation     . Pt has had prior anesthetic.     History of anesthetic complications   - PONV        ROS/MED HX    ENT/Pulmonary:      (-) tobacco use, asthma, sleep apnea and recent URI   Neurologic:      (-) seizures and CVA   Cardiovascular:  - neg cardiovascular ROS       METS/Exercise Tolerance:     Hematologic:         Musculoskeletal:         GI/Hepatic:        (-) GERD   Renal/Genitourinary:         Endo:     (+) thyroid problem .   (-) Type II DM   Psychiatric:         Infectious Disease:         Malignancy:         Other:                          Physical Exam  Normal systems: dental    Airway   Mallampati: I  TM distance: >3 FB  Neck ROM: full    Dental     Cardiovascular   Rhythm and rate: regular and normal      Pulmonary    breath sounds clear to auscultation            Lab Results   Component Value Date    WBC 7.2 08/02/2005    HGB 13.6 01/19/2018    HCT 37.3 08/02/2005     08/02/2005     09/13/2019    POTASSIUM 4.3 09/13/2019    CHLORIDE 103 09/13/2019    CO2 30 09/13/2019    BUN 17 09/13/2019    CR 0.68 09/13/2019    GLC 90 09/13/2019    DEEPAK 9.5 09/13/2019    ALBUMIN 3.5 01/19/2018    PROTTOTAL 6.8 01/19/2018    ALT 43 01/19/2018    AST 31 01/19/2018    ALKPHOS 66 01/19/2018    BILITOTAL 0.8 01/19/2018    INR 1.11 01/19/2018    TSH 1.88 09/13/2019    T4 1.61 10/15/2011    HCG Negative 12/03/2003       Preop Vitals  BP Readings from Last 3 Encounters:   11/07/19 131/80   10/24/19 115/76   10/04/19 104/78    Pulse Readings from Last 3 Encounters:   11/07/19 78   10/24/19 95   10/04/19 77      Resp Readings from Last 3 Encounters:   11/07/19 18   10/24/19 18   10/04/19 16    SpO2 Readings from Last 3 Encounters:  "  11/07/19 99%   10/24/19 99%   10/04/19 96%      Temp Readings from Last 1 Encounters:   11/07/19 36  C (96.8  F) (Oral)    Ht Readings from Last 1 Encounters:   11/07/19 1.702 m (5' 7\")      Wt Readings from Last 1 Encounters:   11/07/19 70.9 kg (156 lb 3.2 oz)    Estimated body mass index is 24.46 kg/m  as calculated from the following:    Height as of this encounter: 1.702 m (5' 7\").    Weight as of this encounter: 70.9 kg (156 lb 3.2 oz).       Anesthesia Plan      History & Physical Review  History and physical reviewed and following examination; no interval change.    ASA Status:  2 .        Plan for General and LMA with Intravenous induction. Maintenance will be Balanced.    PONV prophylaxis:  Ondansetron (or other 5HT-3) and Dexamethasone or Solumedrol       Postoperative Care  Postoperative pain management:  IV analgesics.      Consents  Anesthetic plan, risks, benefits and alternatives discussed with:  Patient..                 Keesha Siddiqi  "

## 2019-11-07 NOTE — DISCHARGE INSTRUCTIONS
Information for Patients Discharging with a Transderm Scopolamine Patch       Dry mouth is a common side effect.    Drowsiness is another common side effect especially when combined with pain medication.  Please avoid activities that require mental alertness such as driving a car or making important legal decisions.    Since Scopolamine can cause temporary dilation of the pupils and blurred vision if it comes in contact with the eyes; be sure to wash your hands thoroughly with soap and water immediately after handling the patch.   When you remove your patch, please stick it to a tissue or paper towel for disposal.      Remove the patch immediately and contact a physician in the unlikely event that you experience symptoms of acute glaucoma (pain and reddening of the eyes, accompanied by dilated pupils).    Remove the patch if you develop any difficulties urinating.  If you cannot urinate after removing your patch, please notify your surgeon.    Remove the patch 24 hours after surgery.      Same Day Surgery Discharge Instructions for  Sedation and General Anesthesia       It's not unusual to feel dizzy, light-headed or faint for up to 24 hours after surgery or while taking pain medication.  If you have these symptoms: sit for a few minutes before standing and have someone assist you when you get up to walk or use the bathroom.      You should rest and relax for the next 24 hours. We recommend you make arrangements to have an adult stay with you for at least 24 hours after your discharge.  Avoid hazardous and strenuous activity.      DO NOT DRIVE any vehicle or operate mechanical equipment for 24 hours following the end of your surgery.  Even though you may feel normal, your reactions may be affected by the medication you have received.      Do not drink alcoholic beverages for 24 hours following surgery.       Slowly progress to your regular diet as you feel able. It's not unusual to feel nauseated and/or vomit  after receiving anesthesia.  If you develop these symptoms, drink clear liquids (apple juice, ginger ale, broth, 7-up, etc. ) until you feel better.  If your nausea and vomiting persists for 24 hours, please notify your surgeon.        All narcotic pain medications, along with inactivity and anesthesia, can cause constipation. Drinking plenty of liquids and increasing fiber intake will help.      For any questions of a medical nature, call your surgeon.      Do not make important decisions for 24 hours.      If you had general anesthesia, you may have a sore throat for a couple of days related to the breathing tube used during surgery.  You may use Cepacol lozenges to help with this discomfort.  If it worsens or if you develop a fever, contact your surgeon.       If you feel your pain is not well managed with the pain medications prescribed by your surgeon, please contact your surgeon's office to let them know so they can address your concerns.     **If you have questions or concerns about your procedure,  call Dr. Diaz at [phone]**

## 2019-11-07 NOTE — ANESTHESIA CARE TRANSFER NOTE
Patient: Lety Baldwin    Procedure(s):  REVISION OF RIGHT BREAST RECONSTRUCTION WITH IMPLANT EXCHANGE WITH CAPSULOTOMIES    Diagnosis: BREAST RECONSTRUCTION  Diagnosis Additional Information: No value filed.    Anesthesia Type:   General, LMA     Note:  Airway :Face Mask  Patient transferred to:PACU  Comments: At end of procedure, spontaneous respirations, adequate tidal volumes, followed commands to voice, LMA removed atraumatically, oropharynx suctioned, airway patent after LMA removal. Oxygen via facemask at 8 liters per minute to PACU. Oxygen tubing connected to wall O2 in PACU, SpO2, NiBP, and EKG monitors and alarms on and functioning, Brock Hugger warmer connected to patient gown, report on patient's clinical status given to PACU RN, RN questions answered.Handoff Report: Identifed the Patient, Identified the Reponsible Provider, Reviewed the pertinent medical history, Discussed the surgical course, Reviewed Intra-OP anesthesia mangement and issues during anesthesia, Set expectations for post-procedure period and Allowed opportunity for questions and acknowledgement of understanding      Vitals: (Last set prior to Anesthesia Care Transfer)    CRNA VITALS  11/7/2019 1344 - 11/7/2019 1419      11/7/2019             Pulse:  72    SpO2:  100 %    Resp Rate (set):  10                Electronically Signed By: HAMILTON Walter CRNA  November 7, 2019  2:19 PM

## 2019-11-08 LAB — COPATH REPORT: NORMAL

## 2019-11-12 ENCOUNTER — THERAPY VISIT (OUTPATIENT)
Dept: CHIROPRACTIC MEDICINE | Facility: CLINIC | Age: 64
End: 2019-11-12
Payer: COMMERCIAL

## 2019-11-12 DIAGNOSIS — M54.42 ACUTE LEFT-SIDED LOW BACK PAIN WITH LEFT-SIDED SCIATICA: ICD-10-CM

## 2019-11-12 DIAGNOSIS — M99.02 THORACIC SEGMENT DYSFUNCTION: ICD-10-CM

## 2019-11-12 DIAGNOSIS — M99.03 SOMATIC DYSFUNCTION OF LUMBAR REGION: Primary | ICD-10-CM

## 2019-11-12 DIAGNOSIS — M99.04 SOMATIC DYSFUNCTION OF SACRAL REGION: ICD-10-CM

## 2019-11-12 PROCEDURE — 98941 CHIROPRACT MANJ 3-4 REGIONS: CPT | Mod: AT | Performed by: CHIROPRACTOR

## 2019-11-12 PROCEDURE — 99213 OFFICE O/P EST LOW 20 MIN: CPT | Mod: 25 | Performed by: CHIROPRACTOR

## 2019-11-12 NOTE — PROGRESS NOTES
Initial Chiropractic Clinic Visit    PCP: Ernestina Figueroaang Baldwin is a 64 year old female who is seen  in consultation at the request of  Ernestina Figueroa M.D. presenting with lower lumbar spine pain. Patient reports that the onset was 9/11/19 after bending and lifting to help her  at home.  Patient reports the pain did travel into the left lower leg to the foot. Patient reports the radicular leg pain is mostly gone now.  Pain currently radiating to left glute on occasion.. Prior to onset, the patient was able to sit 2 hours without low back pain. Patient notes that due to symptoms, they can only sit 1/2 hour without pain increasing. Lety Baldwin notes   sitting rated at a 4/10 painful, difficult and prior to this incident it was 0/10.        Injury: Bending/lifting grand kids at home.    Location of Pain: left lower lumbar spine, left sacrum, lower thoracic spine at the following level(s) T5 , T10, L5  and PSIS Left   Duration of Pain: 2 months  Rating of Pain at worst: 5/10  Rating of Pain Currently: 2-3/10  Symptoms are better with: Walking  Symptoms are worse with: sitting and bending  Additional Features: denies LE weakness     Health History  as reported by the patient:    How does the patient rate their own health:   Good    Current or past medical history:   Cancer-breast-1997 and Thyroid problems    Medical allergies  None    Past Traumas/Surgeries  Cancer:  Breast-1997 and Other:  Gall bladder    Family History  This patient has no significant family history    Medications:  Thyroid    Occupation:      Primary job tasks:   Computer work and Prolonged sitting    Barriers as home/work:   none    Additional health Issues:     None        Review of Systems  Musculoskeletal: as above  Remainder of review of systems is negative including constitutional, CV, pulmonary, GI, Skin and Neurologic except as noted in HPI or medical history.    Past Medical History:   Diagnosis Date      Disorder of bone and cartilage, unspecified 2004    -2.0 in 2007     Fracture of metatarsal bone of right foot 5/15     Malignant neoplasm of breast (female), unspecified site 8/97    Ductal Carcinoma in situ right     Other specified acquired hypothyroidism      PONV (postoperative nausea and vomiting)      Past Surgical History:   Procedure Laterality Date     BREAST SURGERY  01/18/2018    bilat breast implant exchange     C NONSPECIFIC PROCEDURE  9/00/97    Right Total Mastectomy- Reconstructive Surgery. Abstracted 5/15/02.     CHOLECYSTECTOMY       COLONOSCOPY N/A 10/8/2014    Procedure: COLONOSCOPY;  Surgeon: Jelani Moran MD;  Location:  GI     COLONOSCOPY N/A 10/4/2019    Procedure: COLONOSCOPY (fv);  Surgeon: Jelani Moran MD;  Location:  GI     ENDOSCOPIC RETROGRADE CHOLANGIOPANCREATOGRAM N/A 1/19/2018    Procedure: COMBINED ENDOSCOPIC RETROGRADE CHOLANGIOPANCREATOGRAPHY, SPHINCTEROTOMY;  ENDOSCOPIC RETROGRADE CHOLANGIOPANCREATOGRAM (ERCP), SPHINCTEROTOMY, STONE EXTRACTION;  Surgeon: Patsy Mcclure MD;  Location:  OR     ENDOSCOPIC RETROGRADE CHOLANGIOPANCREATOGRAM N/A 1/19/2018    Procedure: COMBINED ENDOSCOPIC RETROGRADE CHOLANGIOPANCREATOGRAPHY, REMOVE STONE/BALLOON;;  Surgeon: Patsy Mcclure MD;  Location: [facility] OR     HC BIOPSY OF BREAST, OPEN INCISIONAL  1992    Rt     HC DILATION/CURETTAGE DIAG/THER NON OB  1995    D & C     LAPAROSCOPIC CHOLECYSTECTOMY WITH CHOLANGIOGRAMS N/A 1/18/2018    Procedure: LAPAROSCOPIC CHOLECYSTECTOMY WITH CHOLANGIOGRAMS;;  Surgeon: Peter Winslow MD;  Location:  [facility]     RECONSTRUCT BREAST BILATERAL, IMPLANT PROSTHESIS BILATERAL, COMBINED Bilateral 1/18/2018    Procedure: COMBINED RECONSTRUCT BREAST BILATERAL, IMPLANT PROSTHESIS BILATERAL;  BILATERAL BREAST RECONSTRUCTION WITH EXCHANGE OF GEL IMPLANTS AND CAPSULOTOMIES, LAPAROSCOPIC CHOLECYSTECTOMY WITH CHOLANGIOGRAMS;  Surgeon: Derrell Diaz MD;  Location:  [facility]     REMOVE AND  "REPLACE BREAST IMPLANT PROSTHESIS Right 11/7/2019    Procedure: REVISION OF RIGHT BREAST RECONSTRUCTION WITH IMPLANT EXCHANGE WITH CAPSULOTOMIES;  Surgeon: Derrell Diaz MD;  Location: [facility] OR     Objective  LMP 01/01/2004       GENERAL APPEARANCE: healthy, alert and no distress   GAIT: NORMAL  SKIN: no suspicious lesions or rashes  NEURO: Normal strength and tone, mentation intact and speech normal  PSYCH:  mentation appears normal and affect normal/bright    Low back exam:    Inspection:  \"     no visible deformity in the low back       normal skin\",    ROM:       full extension       limited flexion due to pain    Tender:       paraspinal muscles    Non Tender:       remainder of lumbar spine    Strength:       ankle dorsiflexion 5/5 Normal       ankle plantarflexion 5/5 Normal       dorsiflexion of the great toe 5/5 Normal    Reflexes:       patellar (L3, L4) 2 bilaterally       achilles tendons (S1) 2 bilaterally    Sensation:      grossly intact throughout lower extremities    Special tests:  Kemps - Right negative and Left negative, SLR - Right negative and Left negative, Slump - Right negative and Left negative and Fabere - Right negative and Left negative    Segmental spinal dysfunction/restrictions found at::  T10 Right rotation restricted  L5 Left rotation restricted  PSIS Left Left rotation restricted.    The following soft tissue hypotonicities were observed:Quad lumb: bilateral, referred pain: no    Trigger points were found in:Lumbar erector spine    Muscle spasm found in:Lumbar erector spine, Quad lumb and T-spine paraspinal      Radiology:  none    Assessment:    No diagnosis found.    RX ordered/plan of care  Anticipated outcomes  Possible risks and side effects    After discussing the risk and benefits of care, patient consented to treatment    Prognosis: Good      Patient's condition:  Patient had restrictions pre-manipulation    Treatment effectiveness:  Post manipulation there is better " intersegmental movement and Patient claims to feel looser post manipulation      Plan:    Procedures:  Evaluation and Management:  77675 Moderate level exam 30 min    CMT:  43968 Chiropractic manipulative treatment 3-4 regions performed   Thoracic: Diversified, T8, T10, Prone  Lumbar: Diversified, L4, L5, Side posture  Pelvis: Drop Table, PSIS Left , Prone . LAD-supine    Modalities:  Brief sTM to L/S paraspinals.    Therapeutic procedures:  None    Response to Treatment  Reduction in symptoms as reported by patient      Treatment plan and goals:  Goals:  SITTING: the patient specific goal is to attain pre-injury status of  2 hours comfortably  PAIN: the patient specific goal of thier symptoms is to attain the pre-inury state of 0-1/10 on the VAS    Frequency of care  Duration of care is estimated to be 4 weeks, from the initial treatment.  It is estimated that the patient will need a total of 1-3 visits to resolve this episode.  For the initial therapeutic trial of care, the frequency is recommended at 1 X week, once daily.  A reevaluation would be clinically appropriate in 4 visits, to determine progress and further course of care.    In-Office Treatment  Evaluation  79772 Chiropractic manipulative treatment 3-4 regions performed   Thoracic: Diversified, T8, T10, Prone  Lumbar: Diversified, L4, L5, Side posture  Pelvis: Drop Table, PSIS Left , Prone . LAD-supine    Modalities:  Brief STM to Lumbar paraspinals 3-4 min    Recommendations:    Instructions:ice 20 minutes every other hour as needed and Continue HEP given in PT    Follow-up:    Return to care in one week if needed.       Discussed the assessment with the patient.      Disclaimer: This note consists of symbols derived from keyboarding, dictation and/or voice recognition software. As a result, there may be errors in the script that have gone undetected. Please consider this when interpreting information found in this chart.

## 2020-02-21 ENCOUNTER — TRANSFERRED RECORDS (OUTPATIENT)
Dept: HEALTH INFORMATION MANAGEMENT | Facility: CLINIC | Age: 65
End: 2020-02-21

## 2020-02-21 ENCOUNTER — OFFICE VISIT (OUTPATIENT)
Dept: INTERNAL MEDICINE | Facility: CLINIC | Age: 65
End: 2020-02-21
Payer: COMMERCIAL

## 2020-02-21 ENCOUNTER — ANCILLARY PROCEDURE (OUTPATIENT)
Dept: GENERAL RADIOLOGY | Facility: CLINIC | Age: 65
End: 2020-02-21
Attending: INTERNAL MEDICINE
Payer: COMMERCIAL

## 2020-02-21 VITALS
WEIGHT: 163.3 LBS | DIASTOLIC BLOOD PRESSURE: 75 MMHG | HEIGHT: 67 IN | RESPIRATION RATE: 12 BRPM | HEART RATE: 105 BPM | BODY MASS INDEX: 25.63 KG/M2 | OXYGEN SATURATION: 97 % | SYSTOLIC BLOOD PRESSURE: 113 MMHG | TEMPERATURE: 98.8 F

## 2020-02-21 DIAGNOSIS — J20.9 ACUTE BRONCHITIS, UNSPECIFIED ORGANISM: Primary | ICD-10-CM

## 2020-02-21 DIAGNOSIS — R05.9 COUGH: ICD-10-CM

## 2020-02-21 PROCEDURE — 71046 X-RAY EXAM CHEST 2 VIEWS: CPT

## 2020-02-21 PROCEDURE — 99214 OFFICE O/P EST MOD 30 MIN: CPT | Performed by: INTERNAL MEDICINE

## 2020-02-21 RX ORDER — AZITHROMYCIN 250 MG/1
TABLET, FILM COATED ORAL
Qty: 6 TABLET | Refills: 0 | Status: SHIPPED | OUTPATIENT
Start: 2020-02-21 | End: 2020-09-25

## 2020-02-21 ASSESSMENT — MIFFLIN-ST. JEOR: SCORE: 1328.35

## 2020-02-21 NOTE — NURSING NOTE
"/75 (BP Location: Left arm, Patient Position: Sitting, Cuff Size: Adult Regular)   Pulse 105   Temp 98.8  F (37.1  C) (Oral)   Resp 12   Ht 1.702 m (5' 7\")   Wt 74.1 kg (163 lb 4.8 oz)   LMP 01/01/2004   SpO2 97%   BMI 25.58 kg/m      "

## 2020-02-21 NOTE — PROGRESS NOTES
Subjective     Lety Baldwin is a 64 year old female who presents to clinic today for the following health issues:    HPI     Complaints of upper respiratory symptoms. She reports symptoms began 2 weeks ago with cold-like symptoms including congestion, cough.  The symptoms improved gradually and were almost gone, then she started to feel feverish 2 nights ago.  She felt some hot and cold feelings, body aches.  She did not have sweats or shaking chills.  She has been having some soreness in the center of her chest, primarily mid to upper chest with deep breath.  This is a dull ache, not severe pain.  She has mild cough is not any worse.  She checked her temperature couple of times and the highest was 99.9.  She has had some rattling sound with deep breaths.  [facility] and they listen to her lungs and told her that there were crackles and a suspected pneumonia but they could not confirm it so they recommended she go to urgent care or see her primary doctor.       Patient Active Problem List   Diagnosis     Breast cancer (H)     Acquired hypothyroidism     Disorder of bone and cartilage     CARDIOVASCULAR SCREENING; LDL GOAL LESS THAN 130     Advanced directives, counseling/discussion     Current Outpatient Medications   Medication Sig Dispense Refill     levothyroxine (SYNTHROID/LEVOTHROID) 150 MCG tablet Take 1 tablet (150 mcg) by mouth daily 90 tablet 3     VIT CALCIUM CITRATE + D TABS 250-62.5 MG-IU OR  (Patient taking differently: 1 tablet daily)       VITAMIN C 250 MG OR TABS 1 TABLET 3 TIMES DAILY (Patient taking differently: 1 TABLET  DAILY)       HYDROcodone-acetaminophen (NORCO) 5-325 MG tablet Take 1-2 tablets by mouth every 4 hours as needed for moderate to severe pain (Patient not taking: Reported on 2/21/2020) 25 tablet 0      Social History     Tobacco Use     Smoking status: Never Smoker     Smokeless tobacco: Never Used   Substance Use Topics     Alcohol use: No      "Drug use: No        Reviewed and updated as needed this visit by Provider         Review of Systems   Possible fever, chills, malaise, the chest pain with cough.  She reports no shortness of breath, sore throat, nasal congestion, postnasal drainage, sinus pain, ear pain.  Her appetite is okay, she is not having nausea.      Objective    /75 (BP Location: Left arm, Patient Position: Sitting, Cuff Size: Adult Regular)   Pulse 105   Temp 98.8  F (37.1  C) (Oral)   Resp 12   Ht 1.702 m (5' 7\")   Wt 74.1 kg (163 lb 4.8 oz)   LMP 01/01/2004   SpO2 97%   BMI 25.58 kg/m    Body mass index is 25.58 kg/m .  Physical Exam     Lungs: Clear except for few rhonchi, no wheezes with normal or forced expiration, no crackles    Chest x-ray is clear of infiltrates to my reading, pulmonary nodule likely calcified is stable in the right upper lobe            Assessment & Plan     1. Acute bronchitis, unspecified organism  Reassured her x-ray does not show any evidence of pneumonia but advised that the radiologist will read this and will send her the results later.  This may be a new  viral infection following the previous 1, she does not seem to be toxic or severely ill, oxygen levels are good.  Cannot completely rule out bacterial infection but suggest since her symptoms are fairly stable she watch it over the next couple of days, use Tylenol to keep the temperature down, help with malaise.  If she starts having more productive cough, feeling worse, increased temperature still then I did print out a prescription for an antibiotic that she could start.  Over-the-counter cough syrup as needed  - azithromycin 250 MG PO tablet; Two tablets first day, then one tablet daily for four days.  Dispense: 6 tablet; Refill: 0             No follow-ups on file.    Ernestina Figueroa MD  [facility]        "

## 2020-07-10 ENCOUNTER — E-VISIT (OUTPATIENT)
Dept: INTERNAL MEDICINE | Facility: CLINIC | Age: 65
End: 2020-07-10
Payer: COMMERCIAL

## 2020-07-10 ENCOUNTER — MYC MEDICAL ADVICE (OUTPATIENT)
Dept: INTERNAL MEDICINE | Facility: CLINIC | Age: 65
End: 2020-07-10

## 2020-07-10 DIAGNOSIS — Z20.822 SUSPECTED COVID-19 VIRUS INFECTION: Primary | ICD-10-CM

## 2020-07-10 DIAGNOSIS — N39.0 URINARY TRACT INFECTION WITHOUT HEMATURIA, SITE UNSPECIFIED: Primary | ICD-10-CM

## 2020-07-10 PROCEDURE — 99421 OL DIG E/M SVC 5-10 MIN: CPT | Performed by: INTERNAL MEDICINE

## 2020-07-10 RX ORDER — NITROFURANTOIN 25; 75 MG/1; MG/1
100 CAPSULE ORAL 2 TIMES DAILY
Qty: 14 CAPSULE | Refills: 0 | Status: SHIPPED | OUTPATIENT
Start: 2020-07-10 | End: 2020-09-25

## 2020-07-10 NOTE — TELEPHONE ENCOUNTER
See her hiyalifet message.     Please advise.     Pended the Antibody test. She can schedule lab appt.     There is also E Visit.     Last OV On 2/21/20

## 2020-07-15 DIAGNOSIS — Z20.822 SUSPECTED COVID-19 VIRUS INFECTION: ICD-10-CM

## 2020-07-15 PROCEDURE — 86769 SARS-COV-2 COVID-19 ANTIBODY: CPT | Mod: 90 | Performed by: INTERNAL MEDICINE

## 2020-07-15 PROCEDURE — 99000 SPECIMEN HANDLING OFFICE-LAB: CPT | Performed by: INTERNAL MEDICINE

## 2020-07-15 PROCEDURE — 36415 COLL VENOUS BLD VENIPUNCTURE: CPT | Performed by: INTERNAL MEDICINE

## 2020-07-15 NOTE — LETTER
July 20, 2020        Lety Baldwin  [address] [place] [address]      COVID-19 Antibody Screen   Date Value Ref Range Status   07/15/2020 Negative  Final     Comment:     No COVID-19 antibodies detected.  Patients within 10 days of symptom onset for   COVID-19 may not produce sufficient levels of detectable antibodies.    Immunocompromised COVID-19 patients may take longer to develop antibodies.       COVID-19 Antibody, IgG Titer   Date Value Ref Range Status   07/15/2020 Not Applicable  Final     Comment:     Qualitative screen for total antibodies to COVID-19 (SARS-CoV-2) with   semi-quantitative measurement of IgG COVID-19 antibodies by endpoint titer.    COVID-19 antibodies may be elevated due to a past or current infection.  Negative results do not rule out COVID-19 infection.  Results from antibody   testing should not be used as the sole basis to diagnose or exclude SARS-CoV-2   infection or to inform infection status.  COVID-19 PCR test should be ordered   if current infection is suspected.  False positive results may occur in rare   cases due to cross-reacting antibodies.  This test was developed and its performance characteristics determined by the   [facility] ([facility]),   which is regulated under CLIA as qualified to perform high-complexity testing.    This test has not been reviewed by the FDA.  Testing performed by [facility], [facility], [address], [address], [place] [address]         No results found for: COVAB      You have tested NEGATIVE for COVID-19 antibodies. This suggests you have not had or been exposed to COVID-19. But it does not mean that for sure.     The test finds antibodies in most people 10 days after they get sick. For some people, it takes longer than 10 days for antibodies to show up. Others may never show antibodies against COVID-19, especially if they  have weak immune systems.    If you have COVID-19 symptoms now, please stay home and away from others.     What is antibody testing?    This is a kind of blood test. We take a small sample of your blood, and then test it for something called  antibodies.      Your body makes antibodies to fight infection. If your blood has antibodies for a certain germ, it means you ve been infected with that germ in the past.     Sometimes, antibodies stay in your body for years after you ve had the infection. They can be there even if the germ didn t make you sick. They are a sign that your body fought off the infection.    Will this test find antibodies in everyone who s had COVID-19?    No. The test finds antibodies in most people 10 days after they get sick. For some people, it takes longer than 10 days for antibodies to show up. Others may never show antibodies against COVID-19, especially if they have weak immune systems.    What does it mean if the test finds COVID-19 antibodies?    If we find these antibodies, it suggests:     This person has had the virus.     Their body s immune system fought the virus.     We don t know if this will help protect someone from getting COVID-19 again. Scientists are still learning about this.    What are the signs of COVID-19?    Signs of COVID-19 can appear from 2 to 14 days (up to 2 weeks) after you re infected. Some people have no symptoms or only mild symptoms. Others get very sick. The most common symptoms are:          Cough    Shortness of breath or trouble breathing  Or at least 2 of these symptoms:    Fever    Chills    Repeated shaking with chills    Muscle pain    Headache    Sore throat    Losing your sense of taste or smell    You may have other symptoms. Please contact your doctor or clinic for any symptoms that worry you.    Where can I get more information?     To learn the [place] s guidelines for staying home, please visit the [facility] website at  https://www.health.state.[place]/diseases/coronavirus/basics.html    To learn more about COVID-19 and how to care for yourself at home, please visit the CDC website at https://www.cdc.gov/coronavirus/2019-ncov/about/steps-when-sick.html    For more options for care at [facility], please visit our website at [email or web address]    [place] [facility] ([facility]) COVID-19 Hotline:  [phone]

## 2020-07-17 LAB
COVID-19 SPIKE RBD ABY TITER: NORMAL
COVID-19 SPIKE RBD ABY: NEGATIVE

## 2020-07-19 ENCOUNTER — MYC MEDICAL ADVICE (OUTPATIENT)
Dept: INTERNAL MEDICINE | Facility: CLINIC | Age: 65
End: 2020-07-19

## 2020-07-28 ENCOUNTER — DOCUMENTATION ONLY (OUTPATIENT)
Dept: OTHER | Facility: CLINIC | Age: 65
End: 2020-07-28

## 2020-09-15 DIAGNOSIS — E03.9 ACQUIRED HYPOTHYROIDISM: ICD-10-CM

## 2020-09-17 RX ORDER — LEVOTHYROXINE SODIUM 150 UG/1
TABLET ORAL
Qty: 90 TABLET | Refills: 3 | Status: SHIPPED | OUTPATIENT
Start: 2020-09-17 | End: 2021-09-16

## 2020-09-25 ENCOUNTER — HOSPITAL ENCOUNTER (OUTPATIENT)
Dept: MAMMOGRAPHY | Facility: CLINIC | Age: 65
Discharge: HOME | End: 2020-09-25
Attending: INTERNAL MEDICINE | Admitting: INTERNAL MEDICINE
Payer: COMMERCIAL

## 2020-09-25 ENCOUNTER — OFFICE VISIT (OUTPATIENT)
Dept: INTERNAL MEDICINE | Facility: CLINIC | Age: 65
End: 2020-09-25
Payer: COMMERCIAL

## 2020-09-25 VITALS
BODY MASS INDEX: 25.19 KG/M2 | WEIGHT: 160.5 LBS | HEART RATE: 87 BPM | DIASTOLIC BLOOD PRESSURE: 77 MMHG | HEIGHT: 67 IN | TEMPERATURE: 98.5 F | RESPIRATION RATE: 18 BRPM | OXYGEN SATURATION: 99 % | SYSTOLIC BLOOD PRESSURE: 120 MMHG

## 2020-09-25 DIAGNOSIS — Z00.00 ENCOUNTER FOR ROUTINE ADULT HEALTH EXAMINATION WITHOUT ABNORMAL FINDINGS: Primary | ICD-10-CM

## 2020-09-25 DIAGNOSIS — E03.9 ACQUIRED HYPOTHYROIDISM: ICD-10-CM

## 2020-09-25 DIAGNOSIS — Z85.3 HISTORY OF BREAST CANCER: ICD-10-CM

## 2020-09-25 DIAGNOSIS — M79.672 LEFT FOOT PAIN: ICD-10-CM

## 2020-09-25 DIAGNOSIS — Z12.31 VISIT FOR SCREENING MAMMOGRAM: ICD-10-CM

## 2020-09-25 PROCEDURE — 80048 BASIC METABOLIC PNL TOTAL CA: CPT | Performed by: INTERNAL MEDICINE

## 2020-09-25 PROCEDURE — 99213 OFFICE O/P EST LOW 20 MIN: CPT | Mod: 25 | Performed by: INTERNAL MEDICINE

## 2020-09-25 PROCEDURE — 77067 SCR MAMMO BI INCL CAD: CPT | Mod: 52

## 2020-09-25 PROCEDURE — 90471 IMMUNIZATION ADMIN: CPT | Performed by: INTERNAL MEDICINE

## 2020-09-25 PROCEDURE — 99396 PREV VISIT EST AGE 40-64: CPT | Mod: 25 | Performed by: INTERNAL MEDICINE

## 2020-09-25 PROCEDURE — 36415 COLL VENOUS BLD VENIPUNCTURE: CPT | Performed by: INTERNAL MEDICINE

## 2020-09-25 PROCEDURE — 90682 RIV4 VACC RECOMBINANT DNA IM: CPT | Performed by: INTERNAL MEDICINE

## 2020-09-25 PROCEDURE — 84443 ASSAY THYROID STIM HORMONE: CPT | Performed by: INTERNAL MEDICINE

## 2020-09-25 PROCEDURE — 80061 LIPID PANEL: CPT | Performed by: INTERNAL MEDICINE

## 2020-09-25 ASSESSMENT — ENCOUNTER SYMPTOMS
CONSTIPATION: 0
BREAST MASS: 0
DIZZINESS: 0
HEADACHES: 0
HEMATOCHEZIA: 0
FREQUENCY: 0
EYE PAIN: 0
SHORTNESS OF BREATH: 0
WEAKNESS: 0
COUGH: 0
DIARRHEA: 0
SORE THROAT: 0
PARESTHESIAS: 0
NAUSEA: 0
FEVER: 0
NERVOUS/ANXIOUS: 0
DYSURIA: 0
HEARTBURN: 0
ARTHRALGIAS: 0
PALPITATIONS: 0
MYALGIAS: 0
ABDOMINAL PAIN: 0
HEMATURIA: 0
JOINT SWELLING: 0
CHILLS: 0

## 2020-09-25 ASSESSMENT — MIFFLIN-ST. JEOR: SCORE: 1302.71

## 2020-09-25 NOTE — PROGRESS NOTES
SUBJECTIVE:   CC: Lety Baldwin is an 65 year old woman who presents for preventive health visit.       Patient has been advised of split billing requirements and indicates understanding: Yes  Healthy Habits:     Getting at least 3 servings of Calcium per day:  Yes    Bi-annual eye exam:  Yes    Dental care twice a year:  Yes    Sleep apnea or symptoms of sleep apnea:  None    Diet:  Regular (no restrictions)    Frequency of exercise:  6-7 days/week    Duration of exercise:  30-45 minutes    Taking medications regularly:  Yes    Medication side effects:  None    PHQ-2 Total Score: 0    Additional concerns today:  Yes    Ability to successfully perform activities of daily living: Yes, no assistance needed  Home safety:  none identified   Hearing impairment: None    Problems:  History of breast cancer: No evidence of recurrence, mammogram is scheduled  Hypothyroidism: Clinically stable    Current concerns:  She has been having some soreness of the left foot, this is medial foot near the first tarsometatarsal joint.  It is intermittent, not severe.    Today's PHQ-2 Score:   PHQ-2 ( 1999 Pfizer) 9/25/2020   Q1: Little interest or pleasure in doing things 0   Q2: Feeling down, depressed or hopeless 0   PHQ-2 Score 0   Q1: Little interest or pleasure in doing things Not at all   Q2: Feeling down, depressed or hopeless Not at all   PHQ-2 Score 0       Abuse: Current or Past (Physical, Sexual or Emotional) - No  Do you feel safe in your environment? Yes        Social History     Tobacco Use     Smoking status: Never Smoker     Smokeless tobacco: Never Used   Substance Use Topics     Alcohol use: No     If you drink alcohol do you typically have >3 drinks per day or >7 drinks per week? No    Alcohol Use 9/25/2020   Prescreen: >3 drinks/day or >7 drinks/week? Not Applicable   Prescreen: >3 drinks/day or >7 drinks/week? -   No flowsheet data found.    Reviewed orders with patient.  Reviewed health maintenance and updated  orders accordingly - Yes        Alternate mammogram schedule due to breast cancer history scheduled    Pertinent mammograms are reviewed under the imaging tab.  History of abnormal Pap smear: NO - age 30-65 PAP every 5 years with negative HPV co-testing recommended  PAP / HPV Latest Ref Rng & Units 10/4/2018 9/28/2015 9/28/2012   PAP - NIL NIL NIL   HPV 16 DNA NEG:Negative Negative - -   HPV 18 DNA NEG:Negative Negative - -   OTHER HR HPV NEG:Negative Negative - -     Reviewed and updated as needed this visit by clinical staff       Patient Active Problem List   Diagnosis     Breast cancer (H)     Acquired hypothyroidism     Disorder of bone and cartilage     CARDIOVASCULAR SCREENING; LDL GOAL LESS THAN 130     Current Outpatient Medications   Medication Sig Dispense Refill     levothyroxine (SYNTHROID/LEVOTHROID) 150 MCG tablet TAKE 1 TABLET BY MOUTH EVERY DAY 90 tablet 3     VIT CALCIUM CITRATE + D TABS 250-62.5 MG-IU OR  (Patient taking differently: 1 tablet daily)       VITAMIN C 250 MG OR TABS 1 TABLET 3 TIMES DAILY (Patient taking differently: 1 TABLET  DAILY)        Reviewed and updated as needed this visit by Provider            Review of Systems   Constitutional: Negative for chills and fever.   HENT: Negative for congestion, ear pain, hearing loss and sore throat.    Eyes: Negative for pain and visual disturbance.   Respiratory: Negative for cough and shortness of breath.    Cardiovascular: Negative for chest pain, palpitations and peripheral edema.   Gastrointestinal: Negative for abdominal pain, constipation, diarrhea, heartburn, hematochezia and nausea.   Breasts:  Negative for tenderness, breast mass and discharge.   Genitourinary: Negative for dysuria, frequency, genital sores, hematuria, pelvic pain, urgency, vaginal bleeding and vaginal discharge.   Musculoskeletal: Negative for arthralgias, joint swelling and myalgias.   Skin: Negative for rash.   Neurological: Negative for dizziness, weakness,  "headaches and paresthesias.   Psychiatric/Behavioral: Negative for mood changes. The patient is not nervous/anxious.           OBJECTIVE:     Physical Exam    Patient alert, in no acute distress  /77 (BP Location: Right arm, Patient Position: Sitting, Cuff Size: Adult Regular)   Pulse 87   Temp 98.5  F (36.9  C) (Oral)   Resp 18   Ht 1.689 m (5' 6.5\")   Wt 72.8 kg (160 lb 8 oz)   LMP 01/01/2004   SpO2 99%   BMI 25.52 kg/m      HEENT: extraocular movements are intact, pupils equal and reactive to light and accommodation, TMs clear  NECK: Neck supple. No adenopathy. Thyroid symmetric, normal size,, Carotids without bruits.  PULMONARY: clear to auscultation  CARDIAC: regular rate and rhythm and no murmurs, clicks, or gallops  PULSES: 2/2 throughout  BACK: no spinal or CVAT  ABDOMINAL: Soft, nontender.  Normal bowel sounds.  No hepatosplenomegaly or abnormal masses  BREAST: No breast masses or tenderness, No axillary masses or tenderness and No galactorrhea  REFLEXES: 2+ throughout  Left foot: There is mild tenderness at the first tarsometatarsal joint with some mild bony prominence.             ASSESSMENT/PLAN:   1. Encounter for routine adult health examination without abnormal findings  Up to date  - Lipid panel reflex to direct LDL Fasting  - Basic metabolic panel  (Ca, Cl, CO2, Creat, Gluc, K, Na, BUN)    2. Acquired hypothyroidism  Recheck lab, continue med  - TSH with free T4 reflex    3. Left foot pain  Suspect this may be some mild osteoarthritis, advised to try to make sure she wear shoes with good arch support to support the joint.  If worsens would refer to podiatry    4. History of breast cancer  Mammogram is scheduled, no evidence of recurrence      Patient has been advised of split billing requirements and indicates understanding: Yes  COUNSELING:  Reviewed preventive health counseling, as reflected in patient instructions    Estimated body mass index is 25.58 kg/m  as calculated from the " "following:    Height as of 2/21/20: 1.702 m (5' 7\").    Weight as of 2/21/20: 74.1 kg (163 lb 4.8 oz).        She reports that she has never smoked. She has never used smokeless tobacco.      Counseling Resources:  ATP IV Guidelines  Pooled Cohorts Equation Calculator  Breast Cancer Risk Calculator  BRCA-Related Cancer Risk Assessment: FHS-7 Tool  FRAX Risk Assessment  ICSI Preventive Guidelines  Dietary Guidelines for Americans, 2010  USDA's MyPlate  ASA Prophylaxis  Lung CA Screening    Ernestina Figueroa MD  [facility]  "

## 2020-09-25 NOTE — PATIENT INSTRUCTIONS
Shingrix is the new shingles vaccine. Call insurance to find out if covered, whether covered at a pharmacy or doctor's office and the copay.

## 2020-09-26 LAB
ANION GAP SERPL CALCULATED.3IONS-SCNC: 5 MMOL/L (ref 3–14)
BUN SERPL-MCNC: 15 MG/DL (ref 7–30)
CALCIUM SERPL-MCNC: 9.5 MG/DL (ref 8.5–10.1)
CHLORIDE SERPL-SCNC: 104 MMOL/L (ref 94–109)
CHOLEST SERPL-MCNC: 170 MG/DL
CO2 SERPL-SCNC: 27 MMOL/L (ref 20–32)
CREAT SERPL-MCNC: 0.78 MG/DL (ref 0.52–1.04)
GFR SERPL CREATININE-BSD FRML MDRD: 81 ML/MIN/{1.73_M2}
GLUCOSE SERPL-MCNC: 93 MG/DL (ref 70–99)
HDLC SERPL-MCNC: 77 MG/DL
LDLC SERPL CALC-MCNC: 80 MG/DL
NONHDLC SERPL-MCNC: 93 MG/DL
POTASSIUM SERPL-SCNC: 4 MMOL/L (ref 3.4–5.3)
SODIUM SERPL-SCNC: 136 MMOL/L (ref 133–144)
TRIGL SERPL-MCNC: 65 MG/DL
TSH SERPL DL<=0.005 MIU/L-ACNC: 1.73 MU/L (ref 0.4–4)

## 2021-09-04 ENCOUNTER — HEALTH MAINTENANCE LETTER (OUTPATIENT)
Age: 66
End: 2021-09-04

## 2021-10-01 ENCOUNTER — OFFICE VISIT (OUTPATIENT)
Dept: INTERNAL MEDICINE | Facility: CLINIC | Age: 66
End: 2021-10-01
Payer: COMMERCIAL

## 2021-10-01 ENCOUNTER — HOSPITAL ENCOUNTER (OUTPATIENT)
Dept: MAMMOGRAPHY | Facility: CLINIC | Age: 66
Discharge: HOME | End: 2021-10-01
Attending: INTERNAL MEDICINE | Admitting: INTERNAL MEDICINE
Payer: COMMERCIAL

## 2021-10-01 VITALS
SYSTOLIC BLOOD PRESSURE: 122 MMHG | WEIGHT: 149.1 LBS | TEMPERATURE: 98.2 F | HEIGHT: 67 IN | BODY MASS INDEX: 23.4 KG/M2 | OXYGEN SATURATION: 98 % | DIASTOLIC BLOOD PRESSURE: 84 MMHG | RESPIRATION RATE: 18 BRPM | HEART RATE: 93 BPM

## 2021-10-01 DIAGNOSIS — Z12.31 VISIT FOR SCREENING MAMMOGRAM: ICD-10-CM

## 2021-10-01 DIAGNOSIS — C50.911 MALIGNANT NEOPLASM OF RIGHT FEMALE BREAST, UNSPECIFIED ESTROGEN RECEPTOR STATUS, UNSPECIFIED SITE OF BREAST (H): ICD-10-CM

## 2021-10-01 DIAGNOSIS — Z13.6 CARDIOVASCULAR SCREENING; LDL GOAL LESS THAN 130: ICD-10-CM

## 2021-10-01 DIAGNOSIS — Z13.1 SCREENING FOR DIABETES MELLITUS: ICD-10-CM

## 2021-10-01 DIAGNOSIS — E03.9 ACQUIRED HYPOTHYROIDISM: ICD-10-CM

## 2021-10-01 DIAGNOSIS — Z00.00 ENCOUNTER FOR ANNUAL WELLNESS EXAM IN MEDICARE PATIENT: Primary | ICD-10-CM

## 2021-10-01 DIAGNOSIS — M85.89 OSTEOPENIA OF MULTIPLE SITES: ICD-10-CM

## 2021-10-01 PROCEDURE — 80061 LIPID PANEL: CPT | Performed by: INTERNAL MEDICINE

## 2021-10-01 PROCEDURE — 36415 COLL VENOUS BLD VENIPUNCTURE: CPT | Performed by: INTERNAL MEDICINE

## 2021-10-01 PROCEDURE — 90662 IIV NO PRSV INCREASED AG IM: CPT | Performed by: INTERNAL MEDICINE

## 2021-10-01 PROCEDURE — 80048 BASIC METABOLIC PNL TOTAL CA: CPT | Performed by: INTERNAL MEDICINE

## 2021-10-01 PROCEDURE — 99213 OFFICE O/P EST LOW 20 MIN: CPT | Mod: 25 | Performed by: INTERNAL MEDICINE

## 2021-10-01 PROCEDURE — G0009 ADMIN PNEUMOCOCCAL VACCINE: HCPCS | Performed by: INTERNAL MEDICINE

## 2021-10-01 PROCEDURE — 84443 ASSAY THYROID STIM HORMONE: CPT | Performed by: INTERNAL MEDICINE

## 2021-10-01 PROCEDURE — G0008 ADMIN INFLUENZA VIRUS VAC: HCPCS | Performed by: INTERNAL MEDICINE

## 2021-10-01 PROCEDURE — 90670 PCV13 VACCINE IM: CPT | Performed by: INTERNAL MEDICINE

## 2021-10-01 PROCEDURE — G0403 EKG FOR INITIAL PREVENT EXAM: HCPCS | Performed by: INTERNAL MEDICINE

## 2021-10-01 PROCEDURE — G0402 INITIAL PREVENTIVE EXAM: HCPCS | Performed by: INTERNAL MEDICINE

## 2021-10-01 PROCEDURE — 77067 SCR MAMMO BI INCL CAD: CPT | Mod: 52

## 2021-10-01 ASSESSMENT — ENCOUNTER SYMPTOMS
CHILLS: 0
NERVOUS/ANXIOUS: 0
BREAST MASS: 0
PARESTHESIAS: 0
PALPITATIONS: 0
HEARTBURN: 0
DYSURIA: 0
HEADACHES: 0
FEVER: 0
COUGH: 0
FREQUENCY: 0
JOINT SWELLING: 0
WEAKNESS: 0
ABDOMINAL PAIN: 0
MYALGIAS: 0
ARTHRALGIAS: 0
SHORTNESS OF BREATH: 0
CONSTIPATION: 0
SORE THROAT: 0
DIARRHEA: 0
HEMATOCHEZIA: 0
DIZZINESS: 0
HEMATURIA: 0
EYE PAIN: 0
NAUSEA: 0

## 2021-10-01 ASSESSMENT — MIFFLIN-ST. JEOR: SCORE: 1246

## 2021-10-01 ASSESSMENT — ACTIVITIES OF DAILY LIVING (ADL): CURRENT_FUNCTION: NO ASSISTANCE NEEDED

## 2021-10-01 NOTE — PROGRESS NOTES
SUBJECTIVE:   Lety Baldwin is a 66 year old female who presents for Preventive Visit.    {Split Bill scripting  The purpose of this visit is to discuss your medical history and prevent health problems before you are sick. You may be responsible for a co-pay, coinsurance, or deductible if your visit today includes services such as checking on a sore throat, having an x-ray or lab test, or treating and evaluating a new or existing condition :253915}  Patient has been advised of split billing requirements and indicates understanding: Yes   Are you in the first 12 months of your Medicare coverage?  No    HPI  Do you feel safe in your environment? Yes    Have you ever done Advance Care Planning? (For example, a Health Directive, POLST, or a discussion with a medical provider or your loved ones about your wishes): Yes, advance care planning is on file.    {Hearing Test Done (Optional):218754}   Fall risk  Fallen 2 or more times in the past year?: No  Any fall with injury in the past year?: No  click delete button to remove this line now  Cognitive Screening   1) Repeat 3 items (Leader, Season, Table)    2) Clock draw: NORMAL  3) 3 item recall: Recalls 2 objects   Results: NORMAL clock, 1-2 items recalled: COGNITIVE IMPAIRMENT LESS LIKELY    Mini-CogTM Copyright TABITHA Keene. Licensed by the author for use in [facility]; reprinted with permission (leigh [email or web address]). All rights reserved.      Do you have sleep apnea, excessive snoring or daytime drowsiness?: no    Reviewed and updated as needed this visit by clinical staff   Allergies  Meds              Reviewed and updated as needed this visit by Provider                Social History     Tobacco Use     Smoking status: Never Smoker     Smokeless tobacco: Never Used   Substance Use Topics     Alcohol use: No     If you drink alcohol do you typically have >3 drinks per day or >7 drinks per week? No    Alcohol Use 9/25/2020   Prescreen: >3 drinks/day or >7  "drinks/week? Not Applicable   Prescreen: >3 drinks/day or >7 drinks/week? -   No flowsheet data found.        {additional problems to add (Optional):845770}    Current providers sharing in care for this patient include:   Patient Care Team:  Ernestina Figueroa MD as PCP - General  Ernestina Figueroa MD as Assigned PCP    The following health maintenance items are reviewed in Epic and correct as of today:  Health Maintenance Due   Topic Date Due     ANNUAL REVIEW OF HM ORDERS  Never done     ZOSTER IMMUNIZATION (2 of 3) 2014     PHQ-2  2021     FALL RISK ASSESSMENT  2021     INFLUENZA VACCINE (1) 2021     Pneumococcal Vaccine: 65+ Years (1 of 1 - PPSV23) 2021     MEDICARE ANNUAL WELLNESS VISIT  2021     MAMMO SCREENING  2021     {Chronicprobdata (optional):754220}  {Decision Support (Optional):610257}  Any new diagnosis of family breast, ovarian, or bowel cancer? {Yes_Link to Screening / No:563357}    FHS-7: No flowsheet data found.  click delete button to remove this line now  {AMB Mammogram Decision Support (Optional) :053018}  Pertinent mammograms are reviewed under the imaging tab.    Review of Systems  {ROS COMP (Optional):594249}    OBJECTIVE:   LMP 2004  Estimated body mass index is 25.52 kg/m  as calculated from the following:    Height as of 20: 1.689 m (5' 6.5\").    Weight as of 20: 72.8 kg (160 lb 8 oz).  Physical Exam  {Exam (Optional) :958117}    {Diagnostic Test Results (Optional):071710::\"Diagnostic Test Results:\",\"Labs reviewed in Epic\"}    ASSESSMENT / PLAN:   {Diag Picklist:257537}    Patient has been advised of split billing requirements and indicates understanding: {YES / NO:820107::\"Yes\"}  COUNSELING:  {Medicare Counselin}    Estimated body mass index is 25.52 kg/m  as calculated from the following:    Height as of 20: 1.689 m (5' 6.5\").    Weight as of 20: 72.8 kg (160 lb 8 oz).    {Weight Management Plan (ACO) Complete if BMI is " abnormal-  Ages 18-64  BMI >24.9.  Age 65+ with BMI <23 or >30 (Optional):587330}    She reports that she has never smoked. She has never used smokeless tobacco.      Appropriate preventive services were discussed with this patient, including applicable screening as appropriate for cardiovascular disease, diabetes, osteopenia/osteoporosis, and glaucoma.  As appropriate for age/gender, discussed screening for colorectal cancer, prostate cancer, breast cancer, and cervical cancer. Checklist reviewing preventive services available has been given to the patient.    Reviewed patients plan of care and provided an AVS. The {CarePlan:483813} for Lety meets the Care Plan requirement. This Care Plan has been established and reviewed with the {PATIENT, FAMILY MEMBER, CAREGIVER:483732}.    Counseling Resources:  ATP IV Guidelines  Pooled Cohorts Equation Calculator  Breast Cancer Risk Calculator  Breast Cancer: Medication to Reduce Risk  FRAX Risk Assessment  ICSI Preventive Guidelines  Dietary Guidelines for Americans, 2010  [employer] MyPlate  ASA Prophylaxis  Lung CA Screening    Ernestina Figueroa MD  [facility]    Identified Health Risks:

## 2021-10-01 NOTE — NURSING NOTE
"/84 (BP Location: Left arm, Patient Position: Sitting, Cuff Size: Adult Regular)   Pulse 93   Temp 98.2  F (36.8  C) (Oral)   Resp 18   Ht 1.689 m (5' 6.5\")   Wt 67.6 kg (149 lb 1.6 oz)   LMP 01/01/2004   SpO2 98%   BMI 23.70 kg/m      "

## 2021-10-01 NOTE — PROGRESS NOTES
"SUBJECTIVE:   Lety Baldwin is a 66 year old female who presents for Preventive Visit.    Patient has been advised of split billing requirements and indicates understanding: Yes   Are you in the first 12 months of your Medicare coverage?  Yes,  Visual Acuity:  Right Eye: 20/20   Left Eye: 20/40  Both Eyes: 20/20    Healthy Habits:     In general, how would you rate your overall health?  Excellent    Frequency of exercise:  6-7 days/week    Duration of exercise:  30-45 minutes    Do you usually eat at least 4 servings of fruit and vegetables a day, include whole grains    & fiber and avoid regularly eating high fat or \"junk\" foods?  Yes    Taking medications regularly:  No    Medication side effects:  None    Ability to successfully perform activities of daily living:  No assistance needed    Home Safety:  No safety concerns identified    Hearing Impairment:  No hearing concerns    In the past 6 months, have you been bothered by leaking of urine?  No    In general, how would you rate your overall mental or emotional health?  Excellent      PHQ-2 Total Score: 0    Additional concerns today:  No    Problems:   Hypothyroidism: clincally stable  Breast cancer: no recurrence   Osteopenia: stable, DXA due 2 years    Do you feel safe in your environment? Yes    Have you ever done Advance Care Planning? (For example, a Health Directive, POLST, or a discussion with a medical provider or your loved ones about your wishes): Yes, advance care planning is on file.      Fall risk  Fallen 2 or more times in the past year?: No  Any fall with injury in the past year?: No    Cognitive Screening   1) Repeat 3 items (Leader, Season, Table)    2) Clock draw: NORMAL  3) 3 item recall: Recalls 2 objects   Results: NORMAL clock, 1-2 items recalled: COGNITIVE IMPAIRMENT LESS LIKELY    Mini-CogTM Copyright TABITHA Keene. Licensed by the author for use in [facility]; reprinted with permission ([email or web address]). All rights reserved.  "     Do you have sleep apnea, excessive snoring or daytime drowsiness?: no    Reviewed and updated as needed this visit by clinical staff  Tobacco  Allergies  Meds              Patient Active Problem List   Diagnosis     Breast cancer (H)     Acquired hypothyroidism     Osteopenia of multiple sites     CARDIOVASCULAR SCREENING; LDL GOAL LESS THAN 130     Current Outpatient Medications   Medication Sig Dispense Refill     levothyroxine (SYNTHROID/LEVOTHROID) 150 MCG tablet TAKE 1 TABLET BY MOUTH EVERY DAY 90 tablet 1     VIT CALCIUM CITRATE + D TABS 250-62.5 MG-IU OR  (Patient taking differently: 1 tablet daily)       VITAMIN C 250 MG OR TABS 1 TABLET 3 TIMES DAILY (Patient taking differently: 1 TABLET  DAILY)        Reviewed and updated as needed this visit by Provider                Social History     Tobacco Use     Smoking status: Never Smoker     Smokeless tobacco: Never Used   Substance Use Topics     Alcohol use: No     If you drink alcohol do you typically have >3 drinks per day or >7 drinks per week? No    Alcohol Use 9/25/2020   Prescreen: >3 drinks/day or >7 drinks/week? Not Applicable   Prescreen: >3 drinks/day or >7 drinks/week? -   No flowsheet data found.      Current providers sharing in care for this patient include:   Patient Care Team:  Ernestina Figueroa MD as PCP - General  Ernestina Figueroa MD as Assigned PCP    The following health maintenance items are reviewed in Epic and correct as of today:  Health Maintenance Due   Topic Date Due     ANNUAL REVIEW OF  ORDERS  Never done     ZOSTER IMMUNIZATION (2 of 3) 11/14/2014     FALL RISK ASSESSMENT  07/13/2021     INFLUENZA VACCINE (1) 09/01/2021     Pneumococcal Vaccine: 65+ Years (1 of 1 - PPSV23) 07/13/2021     MEDICARE ANNUAL WELLNESS VISIT  09/25/2021     MAMMO SCREENING  09/25/2021         Any new diagnosis of family breast, ovarian, or bowel cancer?     FHS-7: No flowsheet data found.  click delete button to remove this line now  Mammogram  "Screening: Recommended mammography every 1-2 years with patient discussion and risk factor consideration  Pertinent mammograms are reviewed under the imaging tab.    Review of Systems   Constitutional: Negative for chills and fever.   HENT: Negative for congestion, ear pain, hearing loss and sore throat.    Eyes: Negative for pain and visual disturbance.   Respiratory: Negative for cough and shortness of breath.    Cardiovascular: Negative for chest pain, palpitations and peripheral edema.   Gastrointestinal: Negative for abdominal pain, constipation, diarrhea, heartburn, hematochezia and nausea.   Breasts:  Negative for tenderness, breast mass and discharge.   Genitourinary: Negative for dysuria, frequency, genital sores, hematuria, pelvic pain, urgency, vaginal bleeding and vaginal discharge.   Musculoskeletal: Negative for arthralgias, joint swelling and myalgias.   Skin: Negative for rash.   Neurological: Negative for dizziness, weakness, headaches and paresthesias.   Psychiatric/Behavioral: Negative for mood changes. The patient is not nervous/anxious.          OBJECTIVE:   /84 (BP Location: Left arm, Patient Position: Sitting, Cuff Size: Adult Regular)   Pulse 93   Temp 98.2  F (36.8  C) (Oral)   Resp 18   Ht 1.689 m (5' 6.5\")   Wt 67.6 kg (149 lb 1.6 oz)   LMP 01/01/2004   SpO2 98%   BMI 23.70 kg/m   Estimated body mass index is 23.7 kg/m  as calculated from the following:    Height as of this encounter: 1.689 m (5' 6.5\").    Weight as of this encounter: 67.6 kg (149 lb 1.6 oz).  Physical Exam        HEENT: extraocular movements are intact, pupils equal and reactive to light and accommodation, TMs clear  NECK: Neck supple. No adenopathy. Thyroid symmetric, normal size,, Carotids without bruits.  PULMONARY: clear to auscultation  CARDIAC: regular rate and rhythm and no murmurs, clicks, or gallops  PULSES: 2/2 throughout  BACK: no spinal or CVAT  ABDOMINAL: Soft, nontender.  Normal bowel sounds.  " "No hepatosplenomegaly or abnormal masses  BREAST: right surgically absent, intact implant, left breast without masses  REFLEXES: 2+ throughout     EKG: NSR, normal    ASSESSMENT / PLAN:   (Z00.00) Encounter for annual wellness exam in Medicare patient  (primary encounter diagnosis)  Comment: up to date  Plan:     (E03.9) Acquired hypothyroidism  Comment: stable  Plan: TSH with free T4 reflex        Check lab    (M85.89) Osteopenia of multiple sites  Comment: stable  Plan: DXA few years, continue calcium and vitamin D    (Z13.1) Screening for diabetes mellitus  Comment:   Plan: Basic metabolic panel  (Ca, Cl, CO2, Creat,         Gluc, K, Na, BUN)            (Z13.6) CARDIOVASCULAR SCREENING; LDL GOAL LESS THAN 130  Comment:   Plan: Lipid panel reflex to direct LDL Fasting          Breast cancer history: no recurrence, continue yearly mammogram     Patient has been advised of split billing requirements and indicates understanding: Yes  COUNSELING:  Reviewed preventive health counseling, as reflected in patient instructions    Estimated body mass index is 23.7 kg/m  as calculated from the following:    Height as of this encounter: 1.689 m (5' 6.5\").    Weight as of this encounter: 67.6 kg (149 lb 1.6 oz).        She reports that she has never smoked. She has never used smokeless tobacco.      Appropriate preventive services were discussed with this patient, including applicable screening as appropriate for cardiovascular disease, diabetes, osteopenia/osteoporosis, and glaucoma.  As appropriate for age/gender, discussed screening for colorectal cancer, prostate cancer, breast cancer, and cervical cancer. Checklist reviewing preventive services available has been given to the patient.    Reviewed patients plan of care and provided an AVS. The Basic Care Plan (routine screening as documented in Health Maintenance) for Lety meets the Care Plan requirement. This Care Plan has been established and reviewed with the " Patient.    Counseling Resources:  ATP IV Guidelines  Pooled Cohorts Equation Calculator  Breast Cancer Risk Calculator  Breast Cancer: Medication to Reduce Risk  FRAX Risk Assessment  ICSI Preventive Guidelines  Dietary Guidelines for Americans, 2010  USDA's MyPlate  ASA Prophylaxis  Lung CA Screening    Ernestina Figueroa MD  [facility]    Identified Health Risks:

## 2021-10-02 LAB
ANION GAP SERPL CALCULATED.3IONS-SCNC: 1 MMOL/L (ref 3–14)
BUN SERPL-MCNC: 14 MG/DL (ref 7–30)
CALCIUM SERPL-MCNC: 9.3 MG/DL (ref 8.5–10.1)
CHLORIDE BLD-SCNC: 107 MMOL/L (ref 94–109)
CHOLEST SERPL-MCNC: 167 MG/DL
CO2 SERPL-SCNC: 30 MMOL/L (ref 20–32)
CREAT SERPL-MCNC: 0.79 MG/DL (ref 0.52–1.04)
FASTING STATUS PATIENT QL REPORTED: YES
GFR SERPL CREATININE-BSD FRML MDRD: 79 ML/MIN/1.73M2
GLUCOSE BLD-MCNC: 94 MG/DL (ref 70–99)
HDLC SERPL-MCNC: 51 MG/DL
LDLC SERPL CALC-MCNC: 104 MG/DL
NONHDLC SERPL-MCNC: 116 MG/DL
POTASSIUM BLD-SCNC: 4.5 MMOL/L (ref 3.4–5.3)
SODIUM SERPL-SCNC: 138 MMOL/L (ref 133–144)
TRIGL SERPL-MCNC: 61 MG/DL
TSH SERPL DL<=0.005 MIU/L-ACNC: 2.09 MU/L (ref 0.4–4)

## 2021-10-10 RX ORDER — LEVOTHYROXINE SODIUM 150 UG/1
150 TABLET ORAL DAILY
Qty: 90 TABLET | Refills: 3 | Status: SHIPPED | OUTPATIENT
Start: 2021-10-10 | End: 2022-12-09

## 2021-10-28 ENCOUNTER — MYC MEDICAL ADVICE (OUTPATIENT)
Dept: INTERNAL MEDICINE | Facility: CLINIC | Age: 66
End: 2021-10-28

## 2021-11-01 NOTE — TELEPHONE ENCOUNTER
See her mychart message. Please advise if there is a preference to getting the Moderna versus Pfizer or Qing booster?

## 2022-07-13 ENCOUNTER — OFFICE VISIT (OUTPATIENT)
Dept: ORTHOPEDICS | Facility: CLINIC | Age: 67
End: 2022-07-13
Payer: COMMERCIAL

## 2022-07-13 VITALS
HEIGHT: 67 IN | WEIGHT: 152 LBS | DIASTOLIC BLOOD PRESSURE: 86 MMHG | BODY MASS INDEX: 23.86 KG/M2 | SYSTOLIC BLOOD PRESSURE: 144 MMHG

## 2022-07-13 DIAGNOSIS — S67.21XA CRUSHING INJURY OF RIGHT HAND, INITIAL ENCOUNTER: Primary | ICD-10-CM

## 2022-07-13 DIAGNOSIS — S63.641A SPRAIN OF ULNAR COLLATERAL LIGAMENT OF METACARPOPHALANGEAL (MCP) JOINT OF RIGHT THUMB, INITIAL ENCOUNTER: ICD-10-CM

## 2022-07-13 PROCEDURE — 99204 OFFICE O/P NEW MOD 45 MIN: CPT | Performed by: FAMILY MEDICINE

## 2022-07-13 NOTE — PROGRESS NOTES
ASSESSMENT & PLAN  Patient Instructions     1. Crushing injury of right hand, initial encounter    2. Sprain of ulnar collateral ligament of metacarpophalangeal (MCP) joint of right thumb, initial encounter      -Patient has right wrist pain due to a crush injury causing a UCL sprain  -Patient will start formal hand therapy and home exercise program  -X-rays taken in office today show no acute fracture, dislocation or osseous abnormalities.  -Patient was fitted for a thumb spica brace.  To consider a custom brace to be made in hand therapy  -Patient may continue with over-the-counter pain medications as needed  -Patient will follow up if pain does not improve  -Call direct clinic number [[phone]] at any time with questions or concerns.    Albert Yeo MD [facility] Orthopedics and Sports Medicine  [facility]          -----    SUBJECTIVE  Lety Baldwin is a/an 67 year old Right handed female who is seen as a self referral for evaluation of right hand pain. The patient is seen by themselves.    Onset: 6 week(s) ago. Patient describes injury as patient was sitting on a patio, wind blew over patio table umbrella and crushed her hand between the pole and the table  Location of Pain: right volar hand along thenar eminence   Rating of Pain at worst: 9/10  Rating of Pain Currently: 2/10  Worsened by: mousing, grasping and opening objects   Better with: ibourpfen  Treatments tried: ibuprofen, ice   Associated symptoms: denies swelling or bruising, no distal numbness or tingling; denies swelling or warmth  Orthopedic history: NO  Relevant surgical history: NO  Social history: social history: works as     Past Medical History:   Diagnosis Date     Disorder of bone and cartilage, unspecified 2004    -2.0 in 2007     Fracture of metatarsal bone of right foot 5/15     Malignant neoplasm of breast (female), unspecified site 8/97    Ductal Carcinoma in situ right     Other specified acquired  "hypothyroidism      PONV (postoperative nausea and vomiting)      Social History     Socioeconomic History     Marital status:      Spouse name: None     Number of children: 2     Years of education: None     Highest education level: None   Occupational History     Occupation:    Tobacco Use     Smoking status: Never Smoker     Smokeless tobacco: Never Used   Substance and Sexual Activity     Alcohol use: No     Drug use: No     Sexual activity: Not Currently     Partners: Male     Birth control/protection: None     Comment:  has had 2 vas.   Other Topics Concern     Exercise Yes     Seat Belt Yes     Self-Exams Yes     Parent/sibling w/ CABG, MI or angioplasty before 65F 55M? No         Patient's past medical, surgical, social, and family histories were reviewed today and no changes are noted.    REVIEW OF SYSTEMS:  10 point ROS is negative other than symptoms noted above in HPI, Past Medical History or as stated below  Constitutional: NEGATIVE for fever, chills, change in weight  Skin: NEGATIVE for worrisome rashes, moles or lesions  GI/: NEGATIVE for bowel or bladder changes  Neuro: NEGATIVE for weakness, dizziness or paresthesias    OBJECTIVE:  BP (!) 144/86   Ht 1.689 m (5' 6.5\")   Wt 68.9 kg (152 lb)   LMP 01/01/2004   BMI 24.17 kg/m     General: healthy, alert and in no distress  HEENT: no scleral icterus or conjunctival erythema  Skin: no suspicious lesions or rash. No jaundice.  CV: regular rhythm by palpation  Resp: normal respiratory effort without conversational dyspnea   Psych: normal mood and affect  Gait: normal steady gait with appropriate coordination and balance  Neuro: normal light touch sensory exam of the bilateral hands.    MSK:  RIGHT HAND  Inspection:    No swelling or obvious deformity or asymmetry  Palpation:   Carpals: normal   Metacarpals: normal   Thumb: MCP joint, UCL   Fingers: normal  Range of Motion:    Full active flexion and extension at MCP, PIP, " and DIP joints; normal finger cascade without malrotation.  Wrist pronation, supination, and ulnar/radial deviation normal.  Strength:    Grossly intact  Special Tests:    Positive: UCL laxity        Independent visualization of the below image:  Recent Results (from the past 24 hour(s))   XR Hand Right G/E 3 Views    Narrative    No acute fracture, dislocation or significant osseous degenerative   abnormalities.           Albert Yeo MD [facility] Sports and Orthopedic Care

## 2022-07-13 NOTE — LETTER
7/13/2022         RE: Lety Baldwin  [address]  [place] [address]        Dear Colleague,    Thank you for referring your patient, Lety Baldwin, to the [facility] [place]. Please see a copy of my visit note below.    ASSESSMENT & PLAN  Patient Instructions     1. Crushing injury of right hand, initial encounter    2. Sprain of ulnar collateral ligament of metacarpophalangeal (MCP) joint of right thumb, initial encounter      -Patient has right wrist pain due to a crush injury causing a UCL sprain  -Patient will start formal hand therapy and home exercise program  -X-rays taken in office today show no acute fracture, dislocation or osseous abnormalities.  -Patient was fitted for a thumb spica brace.  To consider a custom brace to be made in hand therapy  -Patient may continue with over-the-counter pain medications as needed  -Patient will follow up if pain does not improve  -Call direct clinic number [[phone]] at any time with questions or concerns.    Albert Yeo MD [facility]  [facility]          -----    SUBJECTIVE  Lety Baldwin is a/an 67 year old Right handed female who is seen as a self referral for evaluation of right hand pain. The patient is seen by themselves.    Onset: 6 week(s) ago. Patient describes injury as patient was sitting on a patio, wind blew over patio table umbrella and crushed her hand between the pole and the table  Location of Pain: right volar hand along thenar eminence   Rating of Pain at worst: 9/10  Rating of Pain Currently: 2/10  Worsened by: mousing, grasping and opening objects   Better with: ibourpfen  Treatments tried: ibuprofen, ice   Associated symptoms: denies swelling or bruising, no distal numbness or tingling; denies swelling or warmth  Orthopedic history: NO  Relevant surgical history: NO  Social history: social history: works as     Past Medical History:  "  Diagnosis Date     Disorder of bone and cartilage, unspecified 2004    -2.0 in 2007     Fracture of metatarsal bone of right foot 5/15     Malignant neoplasm of breast (female), unspecified site 8/97    Ductal Carcinoma in situ right     Other specified acquired hypothyroidism      PONV (postoperative nausea and vomiting)      Social History     Socioeconomic History     Marital status:      Spouse name: None     Number of children: 2     Years of education: None     Highest education level: None   Occupational History     Occupation:    Tobacco Use     Smoking status: Never Smoker     Smokeless tobacco: Never Used   Substance and Sexual Activity     Alcohol use: No     Drug use: No     Sexual activity: Not Currently     Partners: Male     Birth control/protection: None     Comment:  has had 2 vas.   Other Topics Concern     Exercise Yes     Seat Belt Yes     Self-Exams Yes     Parent/sibling w/ CABG, MI or angioplasty before 65F 55M? No         Patient's past medical, surgical, social, and family histories were reviewed today and no changes are noted.    REVIEW OF SYSTEMS:  10 point ROS is negative other than symptoms noted above in HPI, Past Medical History or as stated below  Constitutional: NEGATIVE for fever, chills, change in weight  Skin: NEGATIVE for worrisome rashes, moles or lesions  GI/: NEGATIVE for bowel or bladder changes  Neuro: NEGATIVE for weakness, dizziness or paresthesias    OBJECTIVE:  BP (!) 144/86   Ht 1.689 m (5' 6.5\")   Wt 68.9 kg (152 lb)   LMP 01/01/2004   BMI 24.17 kg/m     General: healthy, alert and in no distress  HEENT: no scleral icterus or conjunctival erythema  Skin: no suspicious lesions or rash. No jaundice.  CV: regular rhythm by palpation  Resp: normal respiratory effort without conversational dyspnea   Psych: normal mood and affect  Gait: normal steady gait with appropriate coordination and balance  Neuro: normal light touch sensory exam of " the bilateral hands.    MSK:  RIGHT HAND  Inspection:    No swelling or obvious deformity or asymmetry  Palpation:   Carpals: normal   Metacarpals: normal   Thumb: MCP joint, UCL   Fingers: normal  Range of Motion:    Full active flexion and extension at MCP, PIP, and DIP joints; normal finger cascade without malrotation.  Wrist pronation, supination, and ulnar/radial deviation normal.  Strength:    Grossly intact  Special Tests:    Positive: UCL laxity        Independent visualization of the below image:  Recent Results (from the past 24 hour(s))   XR Hand Right G/E 3 Views    Narrative    No acute fracture, dislocation or significant osseous degenerative   abnormalities.           Albert Yeo MD [facility] Orthopedic Care        Again, thank you for allowing me to participate in the care of your patient.        Sincerely,        Albert Yeo, MD

## 2022-07-13 NOTE — PATIENT INSTRUCTIONS
1. Crushing injury of right hand, initial encounter    2. Sprain of ulnar collateral ligament of metacarpophalangeal (MCP) joint of right thumb, initial encounter      -Patient has right wrist pain due to a crush injury causing a UCL sprain  -Patient will start formal hand therapy and home exercise program  -X-rays taken in office today show no acute fracture, dislocation or osseous abnormalities.  -Patient was fitted for a thumb spica brace.  To consider a custom brace to be made in hand therapy  -Patient may continue with over-the-counter pain medications as needed  -Patient will follow up if pain does not improve  -Call direct clinic number [[phone]] at any time with questions or concerns.    Albert Yeo MD [facility] Orthopedics and Sports Medicine  [facility] [place]

## 2022-07-26 NOTE — PROGRESS NOTES
Hand Therapy Initial Evaluation    Current Date:  7/27/2022    Diagnosis: R hand crush injury, R MCP UCL sprain  DOI: 5/31/2022 (7/13/2022 MD order date)      Subjective:  Lety Baldwin is a 67 year old female.    Answers for HPI/ROS submitted by the patient on 7/26/2022  Reason for Visit:: Soreness in right thumb  When problem began:: 5/31/2022  How problem occurred:: Tried to keep a patio umbrella from blowing over  in the wind.  Number scale: 2/10  General health as reported by patient: excellent  Please check all that apply to your current or past medical history: cancer, thyroid problems  Medical allergies: none  Surgeries: cancer surgery  Medications you are currently taking: thyroid medication  Occupation::   What are your primary job tasks: computer work, repetitive tasks    Occupational Profile Information:  Right hand dominant  Prior functional level:  independent-all household chores  Patient reports symptoms of pain, stiffness/loss of motion, weakness/loss of strength  Special tests:  x-ray.    Previous treatment: OTC wrist brace  Barriers include:none  Mobility: No difficulty  Transportation: drives  Currently working in normal job without restrictions  Leisure activities/hobbies: crocheting, spending time with significant other Denilson  Other: Pt entered a [place] piece [place] [employer], is looking forward to continuing to sofia pain free    Functional Outcome Measure:   Upper Extremity Functional Index Score:  SCORE:   Column Totals: /80: 59   (A lower score indicates greater disability.)    Objective:  Pain Level (Scale 0-10)   7/27/2022   At Rest 0/10   With Use 2/10, though becomes sharp with certain use     Pain Description  Date 7/27/2022   Location hand and thumb   Pain Quality Aching and Dull   Frequency intermittent     Pain is worst  nighttime, opening jars, twisting off bottle tops   Exacerbated by  sleeping   Relieved by rest   Progression Gradually improving     Edema  (Circumference measured in cm)   7/27/2022 7/27/2022   Thumb L R   P1 5.8 6.7   IP 5.9 6.1   P2 5.3 5.6     Sensation   WNL throughout all nerve distributions; per patient report    ROM   Thumb 7/27/2022 7/27/2022   AROM  (PROM) L R   MP /78 -18/64 pain   IP /63 /46   RABD NT NT   PABD NT NT   Kapandji Opposition Scale (0-10/10) NT NT     Strength  NT    Assessment:  Patient presents with symptoms consistent with diagnosis of right thumb,  with conservative intervention.     Patient's limitations or Problem List includes:  Pain, Increased edema and Weakness of the right hand and thumb which interferes with the patient's ability to perform Self Care Tasks (dressing, eating, bathing, hygiene/toileting), Work Tasks, Sleep Patterns, Recreational Activities and Household Chores as compared to previous level of function.    Rehab Potential:  Excellent - Return to full activity, no limitations    Patient will benefit from skilled Occupational Therapy to increase ROM, overall strength and stability of thumb and decrease pain to return to previous activity level and resume normal daily tasks and to reach their rehab potential.    Barriers to Learning:  No barrier    Communication Issues:  Patient appears to be able to clearly communicate and understand verbal and written communication and follow directions correctly.    Chart Review: Chart Review, Brief history including review of medical and/or therapy records relating to the presenting problem and Simple history review with patient    Identified Performance Deficits: toileting, dressing, feeding, functional mobility, hygiene and grooming, health management and maintenance, home establishment and management, meal preparation and cleanup, shopping, sleep, work and leisure activities    Assessment of Occupational Performance:  5 or more Performance Deficits    Clinical Decision Making (Complexity): Low complexity    Treatment Explanation:  The following has been discussed  with the patient:  RX ordered/plan of care  Anticipated outcomes  Possible risks and side effects    Plan:  Frequency:  2 X a month, once daily  Duration:  for 3 months    Treatment Plan:   Modalities:  US, Fluidotherapy and Paraffin  Therapeutic Exercise:  AROM, AAROM, PROM, Tendon Gliding, Blocking, Reverse Blocking, Place and Hold, Contract Relax, Extensor Tracking, Isotonics, Isometrics and Stabilization   Neuromuscular re-education:  Nerve Gliding, Coordination/Dexterity, Sensory re-education, Desensitization, Kinesthetic Training, Proprioceptive Training, Posture, Kinesiotaping, Strain Counter Strain, Isometrics, Stabilization   Manual Techniques:  Coordination/Dexterity, Joint mobilization, Scar mobilization, Friction massage, Myofascial release, Manual edema mobilization   Orthotic Fabrication:  Hand based  Self Care:  Self Care Tasks, Ergonomic Considerations and Work Tasks    Discharge Plan:  Achieve all LTG.  Independent in home treatment program.  Reach maximal therapeutic benefit.    Home Exercise Program:  Hand based thumb spica  Icing  Thumb AROM protected flexion - across palm    Next Visit:  Check fit of orthosis  MFR  Check edema/ROM

## 2022-07-27 ENCOUNTER — THERAPY VISIT (OUTPATIENT)
Dept: OCCUPATIONAL THERAPY | Facility: CLINIC | Age: 67
End: 2022-07-27
Attending: FAMILY MEDICINE
Payer: COMMERCIAL

## 2022-07-27 DIAGNOSIS — S67.21XA CRUSHING INJURY OF RIGHT HAND, INITIAL ENCOUNTER: Primary | ICD-10-CM

## 2022-07-27 DIAGNOSIS — S63.641A SPRAIN OF ULNAR COLLATERAL LIGAMENT OF METACARPOPHALANGEAL (MCP) JOINT OF RIGHT THUMB, INITIAL ENCOUNTER: ICD-10-CM

## 2022-07-27 PROCEDURE — 97165 OT EVAL LOW COMPLEX 30 MIN: CPT | Mod: GO | Performed by: OCCUPATIONAL THERAPIST

## 2022-07-27 PROCEDURE — 97760 ORTHOTIC MGMT&TRAING 1ST ENC: CPT | Mod: GO | Performed by: OCCUPATIONAL THERAPIST

## 2022-07-27 NOTE — PROGRESS NOTES
[facility]    OUTPATIENT Occupational Therapy ORTHOPEDIC EVALUATION  PLAN OF TREATMENT FOR OUTPATIENT REHABILITATION  (COMPLETE FOR INITIAL CLAIMS ONLY)  Patient's Last Name, First Name, M.I.  YOB: 1955  Lety Baldwin    Provider s Name:  [facility]   Medical Record No.  [id number]   Start of Care Date:  07/27/22   Onset Date:   05/31/22 (7/13/2022)   Type:     ___PT   _X__OT Medical Diagnosis:    Encounter Diagnoses   Name Primary?    Crushing injury of right hand, initial encounter     Sprain of ulnar collateral ligament of metacarpophalangeal (MCP) joint of right thumb, initial encounter         Treatment Diagnosis:  R crush injury, UCL sprain        Goals:     07/27/22 0500   Goal #1   Goal #1 household chores   Previous Performance Level Independent   Current Functional Task    Current Performance Level Moderate difficulty opening a jar   STG Target Perfomance Open a tight or new jar   STG Target Perform Level Mild difficulty   Due Date 08/27/22   LTG Target Task/Performance Pain free household chores   Due Date 10/27/22       Therapy Frequency:  2x/month  Predicted Duration of Therapy Intervention:  3 months    Lauryn Alexandre OT                 I CERTIFY THE NEED FOR THESE SERVICES FURNISHED UNDER        THIS PLAN OF TREATMENT AND WHILE UNDER MY CARE     (Physician attestation of this document indicates review and certification of the therapy plan).                     Certification Date From:  07/27/22   Certification Date To:  10/27/22    Referring Provider:  Albert Yeo    Initial Assessment        See Epic Evaluation SOC Date: 07/27/22

## 2022-08-02 ENCOUNTER — THERAPY VISIT (OUTPATIENT)
Dept: OCCUPATIONAL THERAPY | Facility: CLINIC | Age: 67
End: 2022-08-02
Payer: COMMERCIAL

## 2022-08-02 DIAGNOSIS — S67.21XA CRUSHING INJURY OF RIGHT HAND, INITIAL ENCOUNTER: Primary | ICD-10-CM

## 2022-08-02 DIAGNOSIS — S63.641A SPRAIN OF ULNAR COLLATERAL LIGAMENT OF METACARPOPHALANGEAL (MCP) JOINT OF RIGHT THUMB, INITIAL ENCOUNTER: ICD-10-CM

## 2022-08-02 PROCEDURE — 97110 THERAPEUTIC EXERCISES: CPT | Mod: GO | Performed by: OCCUPATIONAL THERAPIST

## 2022-08-02 PROCEDURE — 97140 MANUAL THERAPY 1/> REGIONS: CPT | Mod: GO | Performed by: OCCUPATIONAL THERAPIST

## 2022-08-16 ENCOUNTER — THERAPY VISIT (OUTPATIENT)
Dept: OCCUPATIONAL THERAPY | Facility: CLINIC | Age: 67
End: 2022-08-16
Payer: COMMERCIAL

## 2022-08-16 DIAGNOSIS — S67.21XA CRUSHING INJURY OF RIGHT HAND, INITIAL ENCOUNTER: Primary | ICD-10-CM

## 2022-08-16 DIAGNOSIS — S63.641A SPRAIN OF ULNAR COLLATERAL LIGAMENT OF METACARPOPHALANGEAL (MCP) JOINT OF RIGHT THUMB, INITIAL ENCOUNTER: ICD-10-CM

## 2022-08-16 PROCEDURE — 97110 THERAPEUTIC EXERCISES: CPT | Mod: GO | Performed by: OCCUPATIONAL THERAPIST

## 2022-08-16 PROCEDURE — 97763 ORTHC/PROSTC MGMT SBSQ ENC: CPT | Mod: GO | Performed by: OCCUPATIONAL THERAPIST

## 2022-08-16 PROCEDURE — 97035 APP MDLTY 1+ULTRASOUND EA 15: CPT | Mod: GO | Performed by: OCCUPATIONAL THERAPIST

## 2022-08-30 ENCOUNTER — THERAPY VISIT (OUTPATIENT)
Dept: OCCUPATIONAL THERAPY | Facility: CLINIC | Age: 67
End: 2022-08-30
Payer: COMMERCIAL

## 2022-08-30 DIAGNOSIS — S63.641A SPRAIN OF ULNAR COLLATERAL LIGAMENT OF METACARPOPHALANGEAL (MCP) JOINT OF RIGHT THUMB, INITIAL ENCOUNTER: ICD-10-CM

## 2022-08-30 DIAGNOSIS — S67.21XA CRUSHING INJURY OF RIGHT HAND, INITIAL ENCOUNTER: Primary | ICD-10-CM

## 2022-08-30 PROCEDURE — 97140 MANUAL THERAPY 1/> REGIONS: CPT | Mod: GO | Performed by: OCCUPATIONAL THERAPIST

## 2022-08-30 PROCEDURE — 97110 THERAPEUTIC EXERCISES: CPT | Mod: GO | Performed by: OCCUPATIONAL THERAPIST

## 2022-08-30 NOTE — PROGRESS NOTES
Progress Note - Hand Therapy  Reporting period is 7/27/22 to 8/30/22.    Current Date:  7/27/2022    Diagnosis: R hand crush injury, R MCP UCL sprain  DOI: 5/31/2022 (7/13/2022 MD order date)    Subjectve:   Subjective changes as noted by patient:  I'd say it's about at 90 percent. I'm able to do most things  Functional changes noted by patient:  Improvement in Self Care Tasks (dressing, eating, bathing), Work Tasks, Recreational Activities, Household Chores and Driving   Patient has noted adverse reaction to:  None    Objective:  Changes noted in objective findings: Yes, see below    Answers for HPI/ROS submitted by the patient on 7/26/2022  Reason for Visit:: Soreness in right thumb  When problem began:: 5/31/2022  How problem occurred:: Tried to keep a patio umbrella from blowing over  in the wind.  Number scale: 2/10  General health as reported by patient: excellent  Please check all that apply to your current or past medical history: cancer, thyroid problems  Medical allergies: none  Surgeries: cancer surgery  Medications you are currently taking: thyroid medication  Occupation::   What are your primary job tasks: computer work, repetitive tasks    Occupational Profile Information:  Right hand dominant  Prior functional level:  independent-all household chores  Patient reports symptoms of pain, stiffness/loss of motion, weakness/loss of strength  Special tests:  x-ray.    Previous treatment: OTC wrist brace  Barriers include:none  Mobility: No difficulty  Transportation: drives  Currently working in normal job without restrictions  Leisure activities/hobbies: crocheting, spending time with significant other Denilson  Other: Pt entered a [employer]fia piece into [place] [employer], is looking forward to continuing to sofia pain free    Functional Outcome Measure:   Upper Extremity Functional Index Score:  SCORE:   Column Totals: /80: 59   (A lower score indicates greater disability.)    Objective:  Pain Level  (Scale 0-10)   7/27/2022 8/30/22   At Rest 0/10 0/10   With Use 2/10, though becomes sharp with certain use 1-2/10     Pain Description  Date 7/27/2022 8/30/22   Location hand and thumb UCL of thumb   Pain Quality Aching and Dull Achy, dull   Frequency intermittent      Pain is worst  nighttime, opening jars, twisting off bottle tops    Exacerbated by  sleeping    Relieved by rest    Progression Gradually improving      Edema (Circumference measured in cm)   7/27/2022 7/27/2022   Thumb L R   P1 5.8 6.7   IP 5.9 6.1   P2 5.3 5.6     Sensation   WNL throughout all nerve distributions; per patient report    ROM   Thumb 7/27/2022 7/27/2022 8/30/22   AROM  (PROM) L R R   MP /78 -18/64 pain /78   IP /63 /46 /65   RABD NT NT    PABD NT NT    Kapandji Opposition Scale (0-10/10) NT NT        Strength   (Measured in pounds)  Pain Report: - none  + mild    ++ moderate    +++ severe    8/30/2022 8/30/2022   Trials L R   1  2  3 46 40   Average       Lat Pinch 8/30/2022 8/30/2022   Trials L R   1  2  3 11 10   Average       3 Pt Pinch 8/30/2022 8/30/2022   Trials L R   1  2  3 9 8.5   Average       Assessment:  Response to therapy has been improvement to:  ROM of Thumb:  Flex  Strength:   and pinch  Pain:  frequency is less, intensity of pain is decreased and duration of pain is decreased    Overall Assessment:  Patient's symptoms are resolving.  Patient is progressing well and is ready to decrease frequency of treatment in the clinic.  Patient would benefit from continued therapy to achieve rehab potential  STG/LTG:  STGoals have been reviewed;  see goal sheet for details and updates.  LTGoals have been reviewed;  see goal sheet for details and updates.    I have re-evaluated this patient and find that the nature, scope, duration and intensity of the therapy is appropriate for the medical condition of the patient.      Plan:  Frequency:  2 X a month, once daily  Duration:  for 2 months (4 visits)    Home Exercise  Program:  Hand based thumb spica  Icing  Thumb AROM protected flexion   Thumb stability    Next Visit:  MFR  Progress strength and stability

## 2022-10-06 ENCOUNTER — HOSPITAL ENCOUNTER (OUTPATIENT)
Dept: MAMMOGRAPHY | Facility: CLINIC | Age: 67
Discharge: HOME | End: 2022-10-06
Attending: INTERNAL MEDICINE | Admitting: INTERNAL MEDICINE
Payer: COMMERCIAL

## 2022-10-06 DIAGNOSIS — Z12.31 VISIT FOR SCREENING MAMMOGRAM: ICD-10-CM

## 2022-10-06 PROCEDURE — 77067 SCR MAMMO BI INCL CAD: CPT | Mod: 52

## 2022-10-07 PROBLEM — S63.641A SPRAIN OF ULNAR COLLATERAL LIGAMENT OF METACARPOPHALANGEAL (MCP) JOINT OF RIGHT THUMB, INITIAL ENCOUNTER: Status: RESOLVED | Noted: 2022-07-27 | Resolved: 2022-10-07

## 2022-10-07 PROBLEM — S67.21XA CRUSHING INJURY OF RIGHT HAND, INITIAL ENCOUNTER: Status: RESOLVED | Noted: 2022-07-27 | Resolved: 2022-10-07

## 2022-10-22 ENCOUNTER — HEALTH MAINTENANCE LETTER (OUTPATIENT)
Age: 67
End: 2022-10-22

## 2022-12-08 ASSESSMENT — ENCOUNTER SYMPTOMS
HEADACHES: 0
FREQUENCY: 0
SHORTNESS OF BREATH: 0
WEAKNESS: 0
COUGH: 0
ARTHRALGIAS: 0
MYALGIAS: 0
DIZZINESS: 0
SORE THROAT: 0
HEMATOCHEZIA: 0
NERVOUS/ANXIOUS: 0
CHILLS: 0
PALPITATIONS: 0
ABDOMINAL PAIN: 0
DYSURIA: 0
BREAST MASS: 0
FEVER: 0
HEARTBURN: 0
CONSTIPATION: 0
JOINT SWELLING: 0
HEMATURIA: 0
EYE PAIN: 0
DIARRHEA: 0
PARESTHESIAS: 0
NAUSEA: 0

## 2022-12-08 ASSESSMENT — ACTIVITIES OF DAILY LIVING (ADL): CURRENT_FUNCTION: NO ASSISTANCE NEEDED

## 2022-12-09 ENCOUNTER — OFFICE VISIT (OUTPATIENT)
Dept: INTERNAL MEDICINE | Facility: CLINIC | Age: 67
End: 2022-12-09
Payer: COMMERCIAL

## 2022-12-09 VITALS
TEMPERATURE: 97.1 F | DIASTOLIC BLOOD PRESSURE: 72 MMHG | SYSTOLIC BLOOD PRESSURE: 124 MMHG | RESPIRATION RATE: 18 BRPM | HEIGHT: 66 IN | BODY MASS INDEX: 24.94 KG/M2 | HEART RATE: 90 BPM | WEIGHT: 155.2 LBS | OXYGEN SATURATION: 99 %

## 2022-12-09 DIAGNOSIS — E03.9 ACQUIRED HYPOTHYROIDISM: ICD-10-CM

## 2022-12-09 DIAGNOSIS — M85.89 OSTEOPENIA OF MULTIPLE SITES: ICD-10-CM

## 2022-12-09 DIAGNOSIS — Z13.1 SCREENING FOR DIABETES MELLITUS: ICD-10-CM

## 2022-12-09 DIAGNOSIS — Z13.6 CARDIOVASCULAR SCREENING; LDL GOAL LESS THAN 130: ICD-10-CM

## 2022-12-09 DIAGNOSIS — Z00.00 ENCOUNTER FOR ANNUAL WELLNESS EXAM IN MEDICARE PATIENT: Primary | ICD-10-CM

## 2022-12-09 DIAGNOSIS — Z85.3 PERSONAL HISTORY OF MALIGNANT NEOPLASM OF BREAST: ICD-10-CM

## 2022-12-09 LAB
ANION GAP SERPL CALCULATED.3IONS-SCNC: 12 MMOL/L (ref 7–15)
BUN SERPL-MCNC: 13.5 MG/DL (ref 8–23)
CALCIUM SERPL-MCNC: 9.4 MG/DL (ref 8.8–10.2)
CHLORIDE SERPL-SCNC: 102 MMOL/L (ref 98–107)
CHOLEST SERPL-MCNC: 165 MG/DL
CREAT SERPL-MCNC: 0.7 MG/DL (ref 0.51–0.95)
DEPRECATED HCO3 PLAS-SCNC: 26 MMOL/L (ref 22–29)
GFR SERPL CREATININE-BSD FRML MDRD: >90 ML/MIN/1.73M2
GLUCOSE SERPL-MCNC: 83 MG/DL (ref 70–99)
HDLC SERPL-MCNC: 62 MG/DL
LDLC SERPL CALC-MCNC: 92 MG/DL
NONHDLC SERPL-MCNC: 103 MG/DL
POTASSIUM SERPL-SCNC: 4.5 MMOL/L (ref 3.4–5.3)
SODIUM SERPL-SCNC: 140 MMOL/L (ref 136–145)
TRIGL SERPL-MCNC: 57 MG/DL
TSH SERPL DL<=0.005 MIU/L-ACNC: 4.82 UIU/ML (ref 0.3–4.2)

## 2022-12-09 PROCEDURE — 84443 ASSAY THYROID STIM HORMONE: CPT | Performed by: INTERNAL MEDICINE

## 2022-12-09 PROCEDURE — 36415 COLL VENOUS BLD VENIPUNCTURE: CPT | Performed by: INTERNAL MEDICINE

## 2022-12-09 PROCEDURE — 80061 LIPID PANEL: CPT | Performed by: INTERNAL MEDICINE

## 2022-12-09 PROCEDURE — 99213 OFFICE O/P EST LOW 20 MIN: CPT | Mod: 25 | Performed by: INTERNAL MEDICINE

## 2022-12-09 PROCEDURE — 84439 ASSAY OF FREE THYROXINE: CPT | Performed by: INTERNAL MEDICINE

## 2022-12-09 PROCEDURE — 80048 BASIC METABOLIC PNL TOTAL CA: CPT | Performed by: INTERNAL MEDICINE

## 2022-12-09 PROCEDURE — G0439 PPPS, SUBSEQ VISIT: HCPCS | Performed by: INTERNAL MEDICINE

## 2022-12-09 RX ORDER — LEVOTHYROXINE SODIUM 150 UG/1
150 TABLET ORAL DAILY
Qty: 90 TABLET | Refills: 3 | Status: SHIPPED | OUTPATIENT
Start: 2022-12-09 | End: 2024-01-05

## 2022-12-09 ASSESSMENT — ENCOUNTER SYMPTOMS
HEMATURIA: 0
SORE THROAT: 0
HEADACHES: 0
NERVOUS/ANXIOUS: 0
ABDOMINAL PAIN: 0
JOINT SWELLING: 0
ARTHRALGIAS: 0
BREAST MASS: 0
SHORTNESS OF BREATH: 0
DIZZINESS: 0
EYE PAIN: 0
CHILLS: 0
FREQUENCY: 0
HEARTBURN: 0
DIARRHEA: 0
CONSTIPATION: 0
DYSURIA: 0
NAUSEA: 0
PARESTHESIAS: 0
WEAKNESS: 0
HEMATOCHEZIA: 0
MYALGIAS: 0
COUGH: 0
PALPITATIONS: 0
FEVER: 0

## 2022-12-09 ASSESSMENT — ACTIVITIES OF DAILY LIVING (ADL): CURRENT_FUNCTION: NO ASSISTANCE NEEDED

## 2022-12-09 ASSESSMENT — PAIN SCALES - GENERAL: PAINLEVEL: NO PAIN (0)

## 2022-12-09 NOTE — PROGRESS NOTES
"SUBJECTIVE:   Lety is a 67 year old who presents for Preventive Visit.  Patient has been advised of split billing requirements and indicates understanding: Yes  Are you in the first 12 months of your Medicare coverage?  No    Healthy Habits:     In general, how would you rate your overall health?  Excellent    Frequency of exercise:  6-7 days/week    Duration of exercise:  15-30 minutes    Do you usually eat at least 4 servings of fruit and vegetables a day, include whole grains    & fiber and avoid regularly eating high fat or \"junk\" foods?  Yes    Taking medications regularly:  Yes    Medication side effects:  None    Ability to successfully perform activities of daily living:  No assistance needed    Home Safety:  No safety concerns identified    Hearing Impairment:  No hearing concerns    In the past 6 months, have you been bothered by leaking of urine?  No    In general, how would you rate your overall mental or emotional health?  Excellent      PHQ-2 Total Score: 0    Additional concerns today:  No    Problems:  1.  Hypothyroidism: Reports no symptoms suggesting thyroid dose is off  2.  Osteopenia: Stable, bone density due next year  3.  History of breast cancer: No evidence of recurrence, had seen her oncologist recently      Patient Active Problem List   Diagnosis     Personal history of malignant neoplasm of breast     Acquired hypothyroidism     Osteopenia of multiple sites     CARDIOVASCULAR SCREENING; LDL GOAL LESS THAN 130     Current Outpatient Medications   Medication Sig Dispense Refill     levothyroxine (SYNTHROID/LEVOTHROID) 150 MCG tablet Take 1 tablet (150 mcg) by mouth daily 90 tablet 3     VIT CALCIUM CITRATE + D TABS 250-62.5 MG-IU OR  (Patient not taking: Reported on 7/13/2022)       VITAMIN C 250 MG OR TABS 1 TABLET 3 TIMES DAILY (Patient not taking: Reported on 7/13/2022)        Have you ever done Advance Care Planning? (For example, a Health Directive, POLST, or a discussion with a " medical provider or your loved ones about your wishes): Yes, advance care planning is on file.       Fall risk  Fallen 2 or more times in the past year?: No  Any fall with injury in the past year?: No    Cognitive Screening   1) Repeat 3 items (Leader, Season, Table)      2) Clock draw:   NORMAL  3) 3 item recall: Recalls 3 objects  Results: 3 items recalled: COGNITIVE IMPAIRMENT LESS LIKELY    Mini-CogTM Copyright TABITHA Keene. Licensed by the author for use in [facility]; reprinted with permission (leigh ann@[facility]). All rights reserved.      Do you have sleep apnea, excessive snoring or daytime drowsiness?: no    Reviewed and updated as needed this visit by clinical staff   Tobacco  Allergies  Meds              Reviewed and updated as needed this visit by Provider                 Social History     Tobacco Use     Smoking status: Never     Smokeless tobacco: Never   Substance Use Topics     Alcohol use: No     If you drink alcohol do you typically have >3 drinks per day or >7 drinks per week? No    Alcohol Use 12/8/2022   Prescreen: >3 drinks/day or >7 drinks/week? No   Prescreen: >3 drinks/day or >7 drinks/week? -           Hypothyroidism Follow-up      Since last visit, patient describes the following symptoms: weight gain of  3lb lbs      Current providers sharing in care for this patient include:   Patient Care Team:  Ernestina Figueroa MD as PCP - General  Ernestina Figueroa MD as Assigned PCP  Yeo, Albert, MD as Assigned Musculoskeletal Provider    The following health maintenance items are reviewed in Epic and correct as of today:  Health Maintenance   Topic Date Due     ANNUAL REVIEW OF  ORDERS  Never done     ZOSTER IMMUNIZATION (3 of 3) 11/26/2021     COVID-19 Vaccine (4 - Booster for Moderna series) 12/28/2021     MEDICARE ANNUAL WELLNESS VISIT  10/01/2022     Pneumococcal Vaccine: 65+ Years (2 - PPSV23 if available, else PCV20) 10/01/2022     DTAP/TDAP/TD IMMUNIZATION (3 - Td or Tdap) 09/12/2023      MAMMO SCREENING  10/06/2023     DEXA  11/05/2023     FALL RISK ASSESSMENT  12/09/2023     COLORECTAL CANCER SCREENING  10/04/2024     LIPID  10/01/2026     ADVANCE CARE PLANNING  10/10/2026     HEPATITIS C SCREENING  Completed     PHQ-2 (once per calendar year)  Completed     INFLUENZA VACCINE  Completed     IPV IMMUNIZATION  Aged Out     MENINGITIS IMMUNIZATION  Aged Out     PAP  Discontinued           FHS-7:   Breast CA Risk Assessment (FHS-7) 10/1/2021 10/1/2021 10/6/2022 12/8/2022   Did any of your first-degree relatives have breast or ovarian cancer? No - No No   Did any of your relatives have bilateral breast cancer? No - No No   Did any man in your family have breast cancer? No - No No   Did any woman in your family have breast and ovarian cancer? No - No No   Did any woman in your family have breast cancer before age 50 y? No - No No   Do you have 2 or more relatives with breast and/or ovarian cancer? No - No No   Do you have 2 or more relatives with breast and/or bowel cancer? No Yes Yes No       Review of Systems   Constitutional: Negative for chills and fever.   HENT: Positive for hearing loss. Negative for congestion, ear pain and sore throat.    Eyes: Negative for pain and visual disturbance.   Respiratory: Negative for cough and shortness of breath.    Cardiovascular: Negative for chest pain, palpitations and peripheral edema.   Gastrointestinal: Negative for abdominal pain, constipation, diarrhea, heartburn, hematochezia and nausea.   Breasts:  Negative for tenderness, breast mass and discharge.   Genitourinary: Negative for dysuria, frequency, genital sores, hematuria, pelvic pain, urgency, vaginal bleeding and vaginal discharge.   Musculoskeletal: Negative for arthralgias, joint swelling and myalgias.   Skin: Negative for rash.   Neurological: Negative for dizziness, weakness, headaches and paresthesias.   Psychiatric/Behavioral: Negative for mood changes. The patient is not nervous/anxious.   "      OBJECTIVE:   /72   Pulse 90   Temp 97.1  F (36.2  C) (Tympanic)   Resp 18   Ht 1.687 m (5' 6.4\")   Wt 70.4 kg (155 lb 3.2 oz)   LMP 01/01/2004   SpO2 99%   Breastfeeding No   BMI 24.75 kg/m   Estimated body mass index is 24.75 kg/m  as calculated from the following:    Height as of this encounter: 1.687 m (5' 6.4\").    Weight as of this encounter: 70.4 kg (155 lb 3.2 oz).  Physical Exam    HEENT: extraocular movements are intact, pupils equal and reactive to light and accommodation, TMs clear  NECK: Neck supple. No adenopathy. Thyroid symmetric, normal size,, Carotids without bruits.  PULMONARY: clear to auscultation  CARDIAC: regular rate and rhythm and no murmurs, clicks, or gallops  PULSES: 2/2 throughout  BACK: no spinal or CVAT  ABDOMINAL: Soft, nontender.  Normal bowel sounds.  No hepatosplenomegaly or abnormal masses           ASSESSMENT / PLAN:   (Z00.00) Encounter for annual wellness exam in Medicare patient  (primary encounter diagnosis)  Comment: up to date   Plan:     (Z85.3) Personal history of malignant neoplasm of breast  Comment: No recurrence  Plan: Follow-up with oncology as planned    (E03.9) Acquired hypothyroidism  Comment: Clinically stable, recheck lab  Plan: levothyroxine (SYNTHROID/LEVOTHROID) 150 MCG         tablet, TSH with free T4 reflex, T4 free            (Z13.6) CARDIOVASCULAR SCREENING; LDL GOAL LESS THAN 130  Comment: Recheck lab  Plan: Lipid panel reflex to direct LDL Fasting            (Z13.1) Screening for diabetes mellitus  Comment:   Plan: Basic metabolic panel  (Ca, Cl, CO2, Creat,         Gluc, K, Na, BUN)            Osteopenia: Stable, bone density next year      Patient has been advised of split billing requirements and indicates understanding: Yes      COUNSELING:  Reviewed preventive health counseling, as reflected in patient instructions        She reports that she has never smoked. She has never used smokeless tobacco.      Appropriate preventive " services were discussed with this patient, including applicable screening as appropriate for cardiovascular disease, diabetes, osteopenia/osteoporosis, and glaucoma.  As appropriate for age/gender, discussed screening for colorectal cancer, prostate cancer, breast cancer, and cervical cancer. Checklist reviewing preventive services available has been given to the patient.    Reviewed patients plan of care and provided an AVS. The Basic Care Plan (routine screening as documented in Health Maintenance) for Lety meets the Care Plan requirement. This Care Plan has been established and reviewed with the Patient.      Ernestina Figueroa MD  [facility]    Identified Health Risks:

## 2022-12-10 ENCOUNTER — HEALTH MAINTENANCE LETTER (OUTPATIENT)
Age: 67
End: 2022-12-10

## 2022-12-10 LAB — T4 FREE SERPL-MCNC: 1.38 NG/DL (ref 0.9–1.7)

## 2022-12-22 ENCOUNTER — MYC MEDICAL ADVICE (OUTPATIENT)
Dept: INTERNAL MEDICINE | Facility: CLINIC | Age: 67
End: 2022-12-22

## 2023-03-06 ENCOUNTER — MYC MEDICAL ADVICE (OUTPATIENT)
Dept: INTERNAL MEDICINE | Facility: CLINIC | Age: 68
End: 2023-03-06
Payer: COMMERCIAL

## 2023-03-06 DIAGNOSIS — E03.9 ACQUIRED HYPOTHYROIDISM: Primary | ICD-10-CM

## 2023-03-07 NOTE — TELEPHONE ENCOUNTER
See her mychart message. Please advise.     Lab Results   Component Value Date    TSH 4.82 12/09/2022    TSH 2.09 10/01/2021    TSH 1.73 09/25/2020    TSH 1.88 09/13/2019

## 2023-03-13 ENCOUNTER — LAB (OUTPATIENT)
Dept: LAB | Facility: CLINIC | Age: 68
End: 2023-03-13
Payer: COMMERCIAL

## 2023-03-13 DIAGNOSIS — E03.9 ACQUIRED HYPOTHYROIDISM: ICD-10-CM

## 2023-03-13 LAB — TSH SERPL DL<=0.005 MIU/L-ACNC: 2.96 UIU/ML (ref 0.3–4.2)

## 2023-03-13 PROCEDURE — 36415 COLL VENOUS BLD VENIPUNCTURE: CPT | Performed by: INTERNAL MEDICINE

## 2023-03-13 PROCEDURE — 84443 ASSAY THYROID STIM HORMONE: CPT | Performed by: INTERNAL MEDICINE

## 2023-06-16 ENCOUNTER — ANCILLARY PROCEDURE (OUTPATIENT)
Dept: GENERAL RADIOLOGY | Facility: CLINIC | Age: 68
End: 2023-06-16
Attending: PODIATRIST
Payer: COMMERCIAL

## 2023-06-16 ENCOUNTER — OFFICE VISIT (OUTPATIENT)
Dept: PODIATRY | Facility: CLINIC | Age: 68
End: 2023-06-16
Payer: COMMERCIAL

## 2023-06-16 VITALS
WEIGHT: 155 LBS | HEIGHT: 66 IN | BODY MASS INDEX: 24.91 KG/M2 | DIASTOLIC BLOOD PRESSURE: 77 MMHG | SYSTOLIC BLOOD PRESSURE: 128 MMHG | OXYGEN SATURATION: 100 % | HEART RATE: 80 BPM

## 2023-06-16 DIAGNOSIS — S99.921A INJURY OF RIGHT FOOT, INITIAL ENCOUNTER: ICD-10-CM

## 2023-06-16 DIAGNOSIS — S99.921A INJURY OF RIGHT FOOT, INITIAL ENCOUNTER: Primary | ICD-10-CM

## 2023-06-16 PROCEDURE — 73630 X-RAY EXAM OF FOOT: CPT | Mod: TC | Performed by: RADIOLOGY

## 2023-06-16 PROCEDURE — 99203 OFFICE O/P NEW LOW 30 MIN: CPT | Performed by: PODIATRIST

## 2023-06-16 NOTE — LETTER
6/16/2023         RE: Lety Baldwin  [address]  [place] [address]        Dear Colleague,    Thank you for referring your patient, Lety Baldwin, to the [facility] PODI[facility]RY. Please see a copy of my visit note below.    ASSESSMENT:  Encounter Diagnosis   Name Primary?     Injury of right foot, initial encounter Yes     MEDICAL DECISION MAKING:  I personally reviewed the x-ray images with Lety.  No obvious fracture of the central metatarsals.  Metatarsophalangeal joints are well aligned.  There is a lateral defect of the proximal phalanx, third toe.  This might represent a fracture.  Questionable irregularity of the midshaft, right fourth toe proximal phalanx.    Since she is 3 weeks out from injury, at this point I simply recommend she consider wearing a supportive and stiff soled shoe to help offload the forefoot.  This is recommended for 3 weeks.  Activity modification as needed    Follow-up in 1 month via Xsilonhart if improving.  Otherwise follow-up in person for repeat x-ray.    Disclaimer: This note consists of symbols derived from keyboarding, dictation and/or voice recognition software. As a result, there may be errors in the script that have gone undetected. Please consider this when interpreting information found in this chart.    Triston Moore DPM, FACFAS, [facility] Department of Podiatry/Foot & Ankle Surgery      ____________________________________________________________________    HPI:       Lety presents today reporting right foot pain secondary to an injury.  3 weeks ago she was preparing to go on vacation to [place] and she stubbed her right forefoot against her suitcase.  She was able to go on her vacation and did hiking and sightseeing.  She did have pain in took some anti-inflammatories at times.  She wear a supportive shoe when walking and hiking.  Her pain persists  Aching, 7 out of 10, comes and goes  More pain with weightbearing    *  Past Medical  History:   Diagnosis Date     Disorder of bone and cartilage, unspecified 2004    -2.0 in 2007     Fracture of metatarsal bone of right foot 5/15     Malignant neoplasm of breast (female), unspecified site 8/97    Ductal Carcinoma in situ right     Other specified acquired hypothyroidism      PONV (postoperative nausea and vomiting)    *  *  Past Surgical History:   Procedure Laterality Date     BREAST SURGERY  01/18/2018    bilat breast implant exchange     CHOLECYSTECTOMY       COLONOSCOPY N/A 10/8/2014    Procedure: COLONOSCOPY;  Surgeon: Jelani Moran MD;  Location:  GI     COLONOSCOPY N/A 10/4/2019    Procedure: COLONOSCOPY (fv);  Surgeon: Jelani Moran MD;  Location:  GI     ENDOSCOPIC RETROGRADE CHOLANGIOPANCREATOGRAM N/A 1/19/2018    Procedure: COMBINED ENDOSCOPIC RETROGRADE CHOLANGIOPANCREATOGRAPHY, SPHINCTEROTOMY;  ENDOSCOPIC RETROGRADE CHOLANGIOPANCREATOGRAM (ERCP), SPHINCTEROTOMY, STONE EXTRACTION;  Surgeon: Patsy Mcclure MD;  Location:  OR     ENDOSCOPIC RETROGRADE CHOLANGIOPANCREATOGRAM N/A 1/19/2018    Procedure: COMBINED ENDOSCOPIC RETROGRADE CHOLANGIOPANCREATOGRAPHY, REMOVE STONE/BALLOON;;  Surgeon: Patsy Mcclure MD;  Location:  OR      BIOPSY OF BREAST, OPEN INCISIONAL  1992    Rt     HC DILATION/CURETTAGE DIAG/THER NON OB  1995    D & C     LAPAROSCOPIC CHOLECYSTECTOMY WITH CHOLANGIOGRAMS N/A 1/18/2018    Procedure: LAPAROSCOPIC CHOLECYSTECTOMY WITH CHOLANGIOGRAMS;;  Surgeon: Peter Winslow MD;  Location:  SD     RECONSTRUCT BREAST BILATERAL, IMPLANT PROSTHESIS BILATERAL, COMBINED Bilateral 1/18/2018    Procedure: COMBINED RECONSTRUCT BREAST BILATERAL, IMPLANT PROSTHESIS BILATERAL;  BILATERAL BREAST RECONSTRUCTION WITH EXCHANGE OF GEL IMPLANTS AND CAPSULOTOMIES, LAPAROSCOPIC CHOLECYSTECTOMY WITH CHOLANGIOGRAMS;  Surgeon: Derrell Diaz MD;  Location:  SD     REMOVE AND REPLACE BREAST IMPLANT PROSTHESIS Right 11/7/2019    Procedure: REVISION OF  "RIGHT BREAST RECONSTRUCTION WITH IMPLANT EXCHANGE WITH CAPSULOTOMIES;  Surgeon: Derrell Diaz MD;  Location:  OR     [facility] NONSPECIFIC PROCEDURE  9/00/97    Right Total Mastectomy- Reconstructive Surgery. Abstracted 5/15/02.   *  *  Current Outpatient Medications   Medication Sig Dispense Refill     levothyroxine (SYNTHROID/LEVOTHROID) 150 MCG tablet Take 1 tablet (150 mcg) by mouth daily 90 tablet 3     VIT CALCIUM CITRATE + D TABS 250-62.5 MG-IU OR            EXAM:    Vitals: /77   Pulse 80   Ht 1.676 m (5' 6\")   Wt 70.3 kg (155 lb)   LMP 01/01/2004   SpO2 100%   BMI 25.02 kg/m    BMI: Body mass index is 25.02 kg/m .    Constitutional:  Lety Baldwin is in no apparent distress, appears well-nourished.  Cooperative with history and physical exam.    Vascular:  Pedal pulses are palpable for both the DP and PT arteries.  CFT < 3 sec.  No edema.      Neuro: Light touch sensation is intact to the L4, L5, S1 distributions  No evidence of weakness, spasticity, or contracture in the lower extremities.     Derm: Normal texture and turgor.  No erythema, ecchymosis, or cyanosis.  No open lesions.     Musculoskeletal:    Lower extremity muscle strength is normal.  Pain on palpation to the distal aspect of the right second third and fourth metatarsal shafts.  Pain with squeezing the proximal phalanx of the right third and fourth toe.    X-Ray Findings:  I personally reviewed the right foot images.  Please see note above.            Again, thank you for allowing me to participate in the care of your patient.        Sincerely,        Triston Moore, BRIE    "

## 2023-06-16 NOTE — PROGRESS NOTES
ASSESSMENT:  Encounter Diagnosis   Name Primary?     Injury of right foot, initial encounter Yes     MEDICAL DECISION MAKING:  I personally reviewed the x-ray images with Lety.  No obvious fracture of the central metatarsals.  Metatarsophalangeal joints are well aligned.  There is a lateral defect of the proximal phalanx, third toe.  This might represent a fracture.  Questionable irregularity of the midshaft, right fourth toe proximal phalanx.    Since she is 3 weeks out from injury, at this point I simply recommend she consider wearing a supportive and stiff soled shoe to help offload the forefoot.  This is recommended for 3 weeks.  Activity modification as needed    Follow-up in 1 month via Pal[facility]rom[facility]t if improving.  Otherwise follow-up in person for repeat x-ray.    Disclaimer: This note consists of symbols derived from keyboarding, dictation and/or voice recognition software. As a result, there may be errors in the script that have gone undetected. Please consider this when interpreting information found in this chart.    Triston Moore DPM, FACFAS, MS    [facility] Department of Podiatry/Foot & Ankle Surgery      ____________________________________________________________________    HPI:       Lety presents today reporting right foot pain secondary to an injury.  3 weeks ago she was preparing to go on vacation to [place] and she stubbed her right forefoot against her suitcase.  She was able to go on her vacation and did hiking and sightseeing.  She did have pain in took some anti-inflammatories at times.  She wear a supportive shoe when walking and hiking.  Her pain persists  Aching, 7 out of 10, comes and goes  More pain with weightbearing    *  Past Medical History:   Diagnosis Date     Disorder of bone and cartilage, unspecified 2004    -2.0 in 2007     Fracture of metatarsal bone of right foot 5/15     Malignant neoplasm of breast (female), unspecified site 8/97    Ductal Carcinoma in situ right     Other  specified acquired hypothyroidism      PONV (postoperative nausea and vomiting)    *  *  Past Surgical History:   Procedure Laterality Date     BREAST SURGERY  01/18/2018    bilat breast implant exchange     CHOLECYSTECTOMY       COLONOSCOPY N/A 10/8/2014    Procedure: COLONOSCOPY;  Surgeon: Jelani Moran MD;  Location:  GI     COLONOSCOPY N/A 10/4/2019    Procedure: COLONOSCOPY (fv);  Surgeon: Jelani Moran MD;  Location:  GI     ENDOSCOPIC RETROGRADE CHOLANGIOPANCREATOGRAM N/A 1/19/2018    Procedure: COMBINED ENDOSCOPIC RETROGRADE CHOLANGIOPANCREATOGRAPHY, SPHINCTEROTOMY;  ENDOSCOPIC RETROGRADE CHOLANGIOPANCREATOGRAM (ERCP), SPHINCTEROTOMY, STONE EXTRACTION;  Surgeon: Patsy Mcclure MD;  Location:  OR     ENDOSCOPIC RETROGRADE CHOLANGIOPANCREATOGRAM N/A 1/19/2018    Procedure: COMBINED ENDOSCOPIC RETROGRADE CHOLANGIOPANCREATOGRAPHY, REMOVE STONE/BALLOON;;  Surgeon: Patsy Mcclure MD;  Location:  OR      BIOPSY OF BREAST, OPEN INCISIONAL  1992    Rt     HC DILATION/CURETTAGE DIAG/THER NON OB  1995    D & C     LAPAROSCOPIC CHOLECYSTECTOMY WITH CHOLANGIOGRAMS N/A 1/18/2018    Procedure: LAPAROSCOPIC CHOLECYSTECTOMY WITH CHOLANGIOGRAMS;;  Surgeon: Peter Winslow MD;  Location:  SD     RECONSTRUCT BREAST BILATERAL, IMPLANT PROSTHESIS BILATERAL, COMBINED Bilateral 1/18/2018    Procedure: COMBINED RECONSTRUCT BREAST BILATERAL, IMPLANT PROSTHESIS BILATERAL;  BILATERAL BREAST RECONSTRUCTION WITH EXCHANGE OF GEL IMPLANTS AND CAPSULOTOMIES, LAPAROSCOPIC CHOLECYSTECTOMY WITH CHOLANGIOGRAMS;  Surgeon: Derrell Diaz MD;  Location:  SD     REMOVE AND REPLACE BREAST IMPLANT PROSTHESIS Right 11/7/2019    Procedure: REVISION OF RIGHT BREAST RECONSTRUCTION WITH IMPLANT EXCHANGE WITH CAPSULOTOMIES;  Surgeon: Derrell Diaz MD;  Location:  OR     [facility] NONSPECIFIC PROCEDURE  9/00/97    Right Total Mastectomy- Reconstructive Surgery. Abstracted 5/15/02.   *  *  Current Outpatient  "Medications   Medication Sig Dispense Refill     levothyroxine (SYNTHROID/LEVOTHROID) 150 MCG tablet Take 1 tablet (150 mcg) by mouth daily 90 tablet 3     VIT CALCIUM CITRATE + D TABS 250-62.5 MG-IU OR            EXAM:    Vitals: /77   Pulse 80   Ht 1.676 m (5' 6\")   Wt 70.3 kg (155 lb)   LMP 01/01/2004   SpO2 100%   BMI 25.02 kg/m    BMI: Body mass index is 25.02 kg/m .    Constitutional:  Lety Baldwin is in no apparent distress, appears well-nourished.  Cooperative with history and physical exam.    Vascular:  Pedal pulses are palpable for both the DP and PT arteries.  CFT < 3 sec.  No edema.      Neuro: Light touch sensation is intact to the L4, L5, S1 distributions  No evidence of weakness, spasticity, or contracture in the lower extremities.     Derm: Normal texture and turgor.  No erythema, ecchymosis, or cyanosis.  No open lesions.     Musculoskeletal:    Lower extremity muscle strength is normal.  Pain on palpation to the distal aspect of the right second third and fourth metatarsal shafts.  Pain with squeezing the proximal phalanx of the right third and fourth toe.    X-Ray Findings:  I personally reviewed the right foot images.  Please see note above.        "

## 2023-06-16 NOTE — PATIENT INSTRUCTIONS
Thank you for choosing [facility] Podiatry / Foot & Ankle Surgery!    DR. WILSON'S CLINIC LOCATIONS:     [facility] TRIAGE LINE: [phone]   [address] [place] [address] APPOINTMENTS: [phone]   [place] [address] RADIOLOGY: [phone]   (Every other Tues - Wed - Fri PM) SET UP SURGERY: [phone]    PHYSICAL THERAPY: [phone]   [place] SPECIALTY BILLING QUESTIONS: [phone] [address] FAX: [phone]   [place] [address]    (Thurs & Fri AM)      TOE & METATARSAL FRACTURE   The structure of the foot is complex, consisting of bones, muscles, tendons, and other soft tissues. Of the 26 bones in the foot, 19 are toe bones (phalanges) and metatarsal bones (the long bones in the midfoot). Fractures of the toe and metatarsal bones are common and require evaluation by a specialist. A foot and ankle surgeon should be seen for proper diagnosis and treatment, even if initial treatment has been received in an emergency room.  A fracture is a break in the bone. Fractures can be divided into two categories: traumatic fractures and stress fractures.  Traumatic fractures (also called acute fractures) are caused by a direct blow or impact, such as seriously stubbing your toe. Traumatic fractures can be displaced or non-displaced. If the fracture is displaced, the bone is broken in such a way that it has changed in position (dislocated).  Signs and symptoms of a traumatic fracture include:  You may hear a sound at the time of the break.    Pinpoint pain  (pain at the place of impact) at the time the fracture occurs and perhaps for a few hours later, but often the pain goes away after several hours.   Crooked or abnormal appearance of the toe.   Bruising and swelling the next day.   It is not true that  if you can walk on it, it s not broken.  Evaluation by a foot and ankle surgeon is always recommended.   Stress fractures are tiny, hairline breaks that are usually caused by repetitive  stress. Stress fractures often afflict athletes who, for example, too rapidly increase their running mileage. They can also be caused by an abnormal foot structure, deformities, or osteoporosis. Improper footwear may also lead to stress fractures. Stress fractures should not be ignored. They require proper medical attention to heal correctly.  Symptoms of stress fractures include:  Pain with or after normal activity   Pain that goes away when resting and then returns when standing or during activity    Pinpoint pain  (pain at the site of the fracture) when touched   Swelling, but no bruising   Consequences of Improper Treatment  Some people say that  the doctor can t do anything for a broken bone in the foot.  This is usually not true. In fact, if a fractured toe or metatarsal bone is not treated correctly, serious complications may develop. For example:  A deformity in the bony architecture which may limit the ability to move the foot or cause difficulty in fitting shoes   Arthritis, which may be caused by a fracture in a joint (the juncture where two bones meet), or may be a result of angular deformities that develop when a displaced fracture is severe or hasn t been properly corrected   Chronic pain and deformity   Non-union, or failure to heal, can lead to subsequent surgery or chronic pain.   TREATMENT: Toe Fractures  Fractures of the toe bones are almost always traumatic fractures. Treatment for traumatic fractures depends on the break itself and may include these options:  Rest. Sometimes rest is all that is needed to treat a traumatic fracture of the toe.   Splinting. The toe may be fitted with a splint to keep it in a fixed position.   Rigid or stiff-soled shoe. Wearing a stiff-soled shoe protects the toe and helps keep it properly positioned.    Yunier taping  the fractured toe to another toe is sometimes appropriate, but in other cases it may be harmful.   Surgery. If the break is badly displaced or if the  joint is affected, surgery may be necessary. Surgery often involves the use of fixation devices, such as pins.   TREATMENT: Metatarsal Fractures  Breaks in the metatarsal bones may be either stress or traumatic fractures. Certain kinds of fractures of the metatarsal bones present unique challenges.  For example, sometimes a fracture of the first metatarsal bone (behind the big toe) can lead to arthritis. Since the big toe is used so frequently and bears more weight than other toes, arthritis in that area can make it painful to walk, bend, or even stand.  Another type of break, called a Cheney fracture, occurs at the base of the fifth metatarsal bone (behind the little toe). It is often misdiagnosed as an ankle sprain, and misdiagnosis can have serious consequences since sprains and fractures require different treatments. Your foot and ankle surgeon is an expert in correctly identifying these conditions as well as other problems of the foot.  Treatment of metatarsal fractures depends on the type and extent of the fracture, and may include:  Rest. Sometimes rest is the only treatment needed to promote healing of a stress or traumatic fracture of a metatarsal bone.   Avoid the offending activity. Because stress fractures result from repetitive stress, it is important to avoid the activity that led to the fracture. Crutches or a wheelchair are sometimes required to offload weight from the foot to give it time to heal.   Immobilization, casting, or rigid shoe. A stiff-soled shoe or other form of immobilization may be used to protect the fractured bone while it is healing.   Surgery. Some traumatic fractures of the metatarsal bones require surgery, especially if the break is badly displaced.   Follow-up care. Your foot and ankle surgeon will provide instructions for care following surgical or non-surgical treatment. Physical therapy, exercises and rehabilitation may be included in a schedule for return to normal  activities.

## 2023-10-13 ENCOUNTER — HOSPITAL ENCOUNTER (OUTPATIENT)
Dept: MAMMOGRAPHY | Facility: CLINIC | Age: 68
Discharge: HOME | End: 2023-10-13
Attending: INTERNAL MEDICINE | Admitting: INTERNAL MEDICINE
Payer: COMMERCIAL

## 2023-10-13 DIAGNOSIS — Z12.31 VISIT FOR SCREENING MAMMOGRAM: ICD-10-CM

## 2023-10-13 PROCEDURE — 77067 SCR MAMMO BI INCL CAD: CPT | Mod: 52

## 2023-12-29 ASSESSMENT — ENCOUNTER SYMPTOMS
CHILLS: 0
FEVER: 0
EYE PAIN: 0
HEMATURIA: 0
HEARTBURN: 0
SORE THROAT: 0
FREQUENCY: 0
DIARRHEA: 0
BREAST MASS: 0
PALPITATIONS: 0
PARESTHESIAS: 0
DIZZINESS: 0
COUGH: 0
WEAKNESS: 0
ARTHRALGIAS: 0
ABDOMINAL PAIN: 0
JOINT SWELLING: 0
HEMATOCHEZIA: 0
NERVOUS/ANXIOUS: 0
MYALGIAS: 0
SHORTNESS OF BREATH: 0
DYSURIA: 0
CONSTIPATION: 0
NAUSEA: 0
HEADACHES: 0

## 2023-12-29 ASSESSMENT — ACTIVITIES OF DAILY LIVING (ADL): CURRENT_FUNCTION: NO ASSISTANCE NEEDED

## 2024-01-01 DIAGNOSIS — E03.9 ACQUIRED HYPOTHYROIDISM: ICD-10-CM

## 2024-01-04 NOTE — TELEPHONE ENCOUNTER
Pt has appt tomorrow with Dr Medina. Will route to her for OV.     TSH   Date Value Ref Range Status   03/13/2023 2.96 0.30 - 4.20 uIU/mL Final   10/01/2021 2.09 0.40 - 4.00 mU/L Final   09/25/2020 1.73 0.40 - 4.00 mU/L Final

## 2024-01-05 ENCOUNTER — OFFICE VISIT (OUTPATIENT)
Dept: INTERNAL MEDICINE | Facility: CLINIC | Age: 69
End: 2024-01-05
Payer: COMMERCIAL

## 2024-01-05 VITALS
BODY MASS INDEX: 26.07 KG/M2 | RESPIRATION RATE: 18 BRPM | DIASTOLIC BLOOD PRESSURE: 82 MMHG | SYSTOLIC BLOOD PRESSURE: 130 MMHG | OXYGEN SATURATION: 98 % | HEIGHT: 66 IN | HEART RATE: 77 BPM | WEIGHT: 162.2 LBS | TEMPERATURE: 97.3 F

## 2024-01-05 DIAGNOSIS — Z78.0 ASYMPTOMATIC POSTMENOPAUSAL STATUS: ICD-10-CM

## 2024-01-05 DIAGNOSIS — Z12.11 SPECIAL SCREENING FOR MALIGNANT NEOPLASMS, COLON: ICD-10-CM

## 2024-01-05 DIAGNOSIS — E03.9 ACQUIRED HYPOTHYROIDISM: Primary | ICD-10-CM

## 2024-01-05 DIAGNOSIS — M85.89 OSTEOPENIA OF MULTIPLE SITES: ICD-10-CM

## 2024-01-05 DIAGNOSIS — Z00.00 ROUTINE GENERAL MEDICAL EXAMINATION AT A HEALTH CARE FACILITY: ICD-10-CM

## 2024-01-05 DIAGNOSIS — Z80.0 FAMILY HISTORY OF COLON CANCER: ICD-10-CM

## 2024-01-05 LAB
ALBUMIN SERPL BCG-MCNC: 4.7 G/DL (ref 3.5–5.2)
ALP SERPL-CCNC: 103 U/L (ref 40–150)
ALT SERPL W P-5'-P-CCNC: 28 U/L (ref 0–50)
ANION GAP SERPL CALCULATED.3IONS-SCNC: 9 MMOL/L (ref 7–15)
AST SERPL W P-5'-P-CCNC: 32 U/L (ref 0–45)
BILIRUB SERPL-MCNC: 0.6 MG/DL
BUN SERPL-MCNC: 11.1 MG/DL (ref 8–23)
CALCIUM SERPL-MCNC: 9.8 MG/DL (ref 8.8–10.2)
CHLORIDE SERPL-SCNC: 103 MMOL/L (ref 98–107)
CHOLEST SERPL-MCNC: 158 MG/DL
CREAT SERPL-MCNC: 0.67 MG/DL (ref 0.51–0.95)
DEPRECATED HCO3 PLAS-SCNC: 27 MMOL/L (ref 22–29)
EGFRCR SERPLBLD CKD-EPI 2021: >90 ML/MIN/1.73M2
ERYTHROCYTE [DISTWIDTH] IN BLOOD BY AUTOMATED COUNT: 12.5 % (ref 10–15)
FASTING STATUS PATIENT QL REPORTED: YES
GLUCOSE SERPL-MCNC: 96 MG/DL (ref 70–99)
HCT VFR BLD AUTO: 43.4 % (ref 35–47)
HDLC SERPL-MCNC: 51 MG/DL
HGB BLD-MCNC: 14.5 G/DL (ref 11.7–15.7)
LDLC SERPL CALC-MCNC: 91 MG/DL
MCH RBC QN AUTO: 30 PG (ref 26.5–33)
MCHC RBC AUTO-ENTMCNC: 33.4 G/DL (ref 31.5–36.5)
MCV RBC AUTO: 90 FL (ref 78–100)
NONHDLC SERPL-MCNC: 107 MG/DL
PLATELET # BLD AUTO: 264 10E3/UL (ref 150–450)
POTASSIUM SERPL-SCNC: 4.8 MMOL/L (ref 3.4–5.3)
PROT SERPL-MCNC: 7.6 G/DL (ref 6.4–8.3)
PTH-INTACT SERPL-MCNC: 48 PG/ML (ref 15–65)
RBC # BLD AUTO: 4.84 10E6/UL (ref 3.8–5.2)
SODIUM SERPL-SCNC: 139 MMOL/L (ref 135–145)
TRIGL SERPL-MCNC: 80 MG/DL
TSH SERPL DL<=0.005 MIU/L-ACNC: 2.85 UIU/ML (ref 0.3–4.2)
VIT D+METAB SERPL-MCNC: 27 NG/ML (ref 20–50)
WBC # BLD AUTO: 8.3 10E3/UL (ref 4–11)

## 2024-01-05 PROCEDURE — 80061 LIPID PANEL: CPT | Performed by: INTERNAL MEDICINE

## 2024-01-05 PROCEDURE — 36415 COLL VENOUS BLD VENIPUNCTURE: CPT | Performed by: INTERNAL MEDICINE

## 2024-01-05 PROCEDURE — 82306 VITAMIN D 25 HYDROXY: CPT | Performed by: INTERNAL MEDICINE

## 2024-01-05 PROCEDURE — 83970 ASSAY OF PARATHORMONE: CPT | Performed by: INTERNAL MEDICINE

## 2024-01-05 PROCEDURE — 80053 COMPREHEN METABOLIC PANEL: CPT | Performed by: INTERNAL MEDICINE

## 2024-01-05 PROCEDURE — 99214 OFFICE O/P EST MOD 30 MIN: CPT | Performed by: INTERNAL MEDICINE

## 2024-01-05 PROCEDURE — 84443 ASSAY THYROID STIM HORMONE: CPT | Performed by: INTERNAL MEDICINE

## 2024-01-05 PROCEDURE — 85027 COMPLETE CBC AUTOMATED: CPT | Performed by: INTERNAL MEDICINE

## 2024-01-05 RX ORDER — LEVOTHYROXINE SODIUM 150 UG/1
150 TABLET ORAL DAILY
Qty: 90 TABLET | Refills: 3 | Status: SHIPPED | OUTPATIENT
Start: 2024-01-05

## 2024-01-05 RX ORDER — LEVOTHYROXINE SODIUM 150 UG/1
150 TABLET ORAL DAILY
Qty: 90 TABLET | Refills: 0 | OUTPATIENT
Start: 2024-01-05

## 2024-01-05 RX ORDER — LEVOTHYROXINE SODIUM 150 UG/1
150 TABLET ORAL DAILY
Qty: 90 TABLET | Refills: 3 | Status: CANCELLED | OUTPATIENT
Start: 2024-01-05

## 2024-01-05 ASSESSMENT — ENCOUNTER SYMPTOMS
BREAST MASS: 0
FREQUENCY: 0
CHILLS: 0
SHORTNESS OF BREATH: 0
HEMATURIA: 0
HEADACHES: 0
PARESTHESIAS: 0
MYALGIAS: 0
WEAKNESS: 0
DYSURIA: 0
JOINT SWELLING: 0
EYE PAIN: 0
ABDOMINAL PAIN: 0
SORE THROAT: 0
ARTHRALGIAS: 0
HEMATOCHEZIA: 0
FEVER: 0
NERVOUS/ANXIOUS: 0
PALPITATIONS: 0
CONSTIPATION: 0
COUGH: 0
DIZZINESS: 0
DIARRHEA: 0
NAUSEA: 0
HEARTBURN: 0

## 2024-01-05 ASSESSMENT — PAIN SCALES - GENERAL: PAINLEVEL: NO PAIN (0)

## 2024-01-05 ASSESSMENT — ACTIVITIES OF DAILY LIVING (ADL): CURRENT_FUNCTION: NO ASSISTANCE NEEDED

## 2024-01-05 NOTE — PROGRESS NOTES
Dr Medina's note    Patient's instructions / PLAN:                                                        Plan:   Labs today - [address]   2. We will decide about the Levothyroxine dose after the results  3. Bone Density Scan ( DEXA), to be done at [facility] [address] -- call [phone] to schedule it   4. Next colonoscopy - Oct 2024  5.  The following vaccines are recommended for you. Please check with your insurance about coverage.  Some insurances cover better if you have these vaccines at the pharmacy:  -- RSV, Flu, Covid  -- Pneumonia 20 ( preferable) or Pneumonia 23  -- Tetanus vaccine - Td  -- Shingrix vaccine - the newest vaccine for shingles   6. Next ANNUAL EXAM after Jan 6, 2025        ASSESSMENT & PLAN:                                                      (Z00.00) Routine general medical examination at a health care facility  Comment:   Plan: Parathyroid Hormone Intact, Vitamin D         Deficiency, CBC with platelets, Comprehensive         metabolic panel, Lipid panel reflex to direct         LDL Fasting, TSH with free T4 reflex              (E03.9) Acquired hypothyroidism  (primary encounter diagnosis)  Comment: Controlled  based on March results   Plan: TSH with free T4 reflex, levothyroxine         (SYNTHROID/LEVOTHROID) 150 MCG tablet            (M85.89) Osteopenia - Fosamax 2007 -- 2012  Comment:   Plan: Parathyroid Hormone Intact, Vitamin D         Deficiency            (Z78.0) Asymptomatic postmenopausal status  Comment:   Plan: DX Hip/Pelvis/Spine            (Z80.0) Family history of colon cancer - next colonoscopy Oct 2024  Comment:   Plan: Colonoscopy Screening  Referral            (Z12.11) Special screening for malignant neoplasms, colon  Comment:   Plan: Colonoscopy Screening  Referral               Chief Complaint:                                                        Annual exam  Follow up chronic medical problems      SUBJECTIVE:                                                     History of present illness     We reviewed the chronic medical problems as above.   I reviewed the recent tests results in [facility]       GI  -- sometimes loose BM in am   -- s/p gallbladder  -- no nausea, V  -- unrelated w food     ROS:     See below    PMHx: - reviewed  Past Medical History:   Diagnosis Date    Disorder of bone and cartilage, unspecified 2004    -2.0 in 2007    Fracture of metatarsal bone of right foot 5/15    Malignant neoplasm of breast (female), unspecified site 8/97    Ductal Carcinoma in situ right    Other specified acquired hypothyroidism     PONV (postoperative nausea and vomiting)        PSHx: reviewed  Past Surgical History:   Procedure Laterality Date    BREAST SURGERY  01/18/2018    bilat breast implant exchange    CHOLECYSTECTOMY      COLONOSCOPY N/A 10/8/2014    Procedure: COLONOSCOPY;  Surgeon: Jelani Moran MD;  Location:  GI    COLONOSCOPY N/A 10/4/2019    Procedure: COLONOSCOPY (fv);  Surgeon: Jelani Moran MD;  Location:  GI    ENDOSCOPIC RETROGRADE CHOLANGIOPANCREATOGRAM N/A 1/19/2018    Procedure: COMBINED ENDOSCOPIC RETROGRADE CHOLANGIOPANCREATOGRAPHY, SPHINCTEROTOMY;  ENDOSCOPIC RETROGRADE CHOLANGIOPANCREATOGRAM (ERCP), SPHINCTEROTOMY, STONE EXTRACTION;  Surgeon: Patsy Mcclure MD;  Location:  OR    ENDOSCOPIC RETROGRADE CHOLANGIOPANCREATOGRAM N/A 1/19/2018    Procedure: COMBINED ENDOSCOPIC RETROGRADE CHOLANGIOPANCREATOGRAPHY, REMOVE STONE/BALLOON;;  Surgeon: Patsy Mcclure MD;  Location:  OR     BIOPSY OF BREAST, OPEN INCISIONAL  1992    Rt    HC DILATION/CURETTAGE DIAG/THER NON OB  1995    D & C    LAPAROSCOPIC CHOLECYSTECTOMY WITH CHOLANGIOGRAMS N/A 1/18/2018    Procedure: LAPAROSCOPIC CHOLECYSTECTOMY WITH CHOLANGIOGRAMS;;  Surgeon: Peter Winslow MD;  Location:  SD    RECONSTRUCT BREAST BILATERAL, IMPLANT PROSTHESIS BILATERAL, COMBINED Bilateral 1/18/2018    Procedure: COMBINED RECONSTRUCT BREAST BILATERAL, IMPLANT  PROSTHESIS BILATERAL;  BILATERAL BREAST RECONSTRUCTION WITH EXCHANGE OF GEL IMPLANTS AND CAPSULOTOMIES, LAPAROSCOPIC CHOLECYSTECTOMY WITH CHOLANGIOGRAMS;  Surgeon: Derrell Diaz MD;  Location:  SD    REMOVE AND REPLACE BREAST IMPLANT PROSTHESIS Right 11/7/2019    Procedure: REVISION OF RIGHT BREAST RECONSTRUCTION WITH IMPLANT EXCHANGE WITH CAPSULOTOMIES;  Surgeon: Derrell Diaz MD;  Location:  OR    ZZC NONSPECIFIC PROCEDURE  9/00/97    Right Total Mastectomy- Reconstructive Surgery. Abstracted 5/15/02.        Soc Hx: No daily alcohol, no smoking  Social History     Socioeconomic History    Marital status:      Spouse name: Not on file    Number of children: 2    Years of education: Not on file    Highest education level: Not on file   Occupational History    Occupation:    Tobacco Use    Smoking status: Never     Passive exposure: Never    Smokeless tobacco: Never   Vaping Use    Vaping Use: Never used   Substance and Sexual Activity    Alcohol use: No    Drug use: No    Sexual activity: Not Currently     Partners: Male     Birth control/protection: None     Comment:  has had 2 vas.   Other Topics Concern     Service Not Asked    Blood Transfusions Not Asked    Caffeine Concern Not Asked    Occupational Exposure Not Asked    Hobby Hazards Not Asked    Sleep Concern Not Asked    Stress Concern Not Asked    Weight Concern Not Asked    Special Diet Not Asked    Back Care Not Asked    Exercise Yes    Bike Helmet Not Asked    Seat Belt Yes    Self-Exams Yes    Parent/sibling w/ CABG, MI or angioplasty before 65F 55M? No   Social History Narrative    Not on file     Social Determinants of Health     Financial Resource Strain: Low Risk  (12/29/2023)    Financial Resource Strain     Within the past 12 months, have you or your family members you live with been unable to get utilities (heat, electricity) when it was really needed?: No   Food Insecurity: Low Risk  (12/29/2023)     Food Insecurity     Within the past 12 months, did you worry that your food would run out before you got money to buy more?: No     Within the past 12 months, did the food you bought just not last and you didn t have money to get more?: No   Transportation Needs: Low Risk  (12/29/2023)    Transportation Needs     Within the past 12 months, has lack of transportation kept you from medical appointments, getting your medicines, non-medical meetings or appointments, work, or from getting things that you need?: No   Physical Activity: Not on file   Stress: Not on file   Social Connections: Not on file   Interpersonal Safety: Low Risk  (1/5/2024)    Interpersonal Safety     Do you feel physically and emotionally safe where you currently live?: Yes     Within the past 12 months, have you been hit, slapped, kicked or otherwise physically hurt by someone?: No     Within the past 12 months, have you been humiliated or emotionally abused in other ways by your partner or ex-partner?: No   Housing Stability: Low Risk  (12/29/2023)    Housing Stability     Do you have housing? : Yes     Are you worried about losing your housing?: No        Fam Hx: reviewed  Family History   Problem Relation Age of Onset    Diabetes Father     Emphysema Father     Osteoporosis Father     Thyroid Disease Father     Hypertension Mother     Alzheimer Disease Mother     Lipids Mother     Cancer - colorectal Mother     Colon Cancer Mother 78        Cause of Death    Hyperlipidemia Mother     Arthritis Maternal Grandmother     C.A.D. Maternal Grandmother     Cancer - colorectal Maternal Grandfather     Alzheimer Disease Maternal Grandfather     Colon Cancer Maternal Grandfather     Cerebrovascular Disease Paternal Grandmother     Osteoporosis Paternal Grandmother     Thyroid Disease Paternal Grandmother          Screening: reviewed      All: reviewed    Meds: reviewed  Current Outpatient Medications   Medication Sig Dispense Refill    levothyroxine  "(SYNTHROID/LEVOTHROID) 150 MCG tablet Take 1 tablet (150 mcg) by mouth daily 90 tablet 3    VIT CALCIUM CITRATE + D TABS 250-62.5 MG-IU OR          OBJECTIVE:                                                    Physical Exam :  Blood pressure 130/82, pulse 77, temperature 97.3  F (36.3  C), temperature source Tympanic, resp. rate 18, height 1.676 m (5' 6\"), weight 73.6 kg (162 lb 3.2 oz), last menstrual period 01/01/2004, SpO2 98%, not currently breastfeeding.     NAD, appears comfortable  Skin clear, no rashes    Neck: supple, no JVD,  no thyroidmegaly  Lymph nodes non palpable in the cervical, supraclavicular axillaries,   Chest: clear to auscultation with good respiratory effort  Cardiac: S1S2, RRR, no mgr appreciated  Abdomen: soft, not tender, not distended, audible bowel sound, no hepatosplenomegaly, no palpable masses, no abdominal bruits  Extremities: no cyanosis, clubbing or edema.   Neuro: A, Ox3, no focal signs.  Breast exam in supine and erect position:R mastectomy, s/p reconstruction L breast implants     Pelvic exam: deferred, s/p menopause, no symptoms, no hx of abnormal pap         Mi Medina MD  Internal Medicine       SUBJECTIVE:   Lety is a 68 year old, presenting for the following:  Patient is being seen for an wellness visit.        1/5/2024    10:04 AM   Additional Questions   Roomed by Sosa Childs       Are you in the first 12 months of your Medicare coverage?  No    Healthy Habits:     In general, how would you rate your overall health?  Excellent    Frequency of exercise:  4-5 days/week    Duration of exercise:  30-45 minutes    Do you usually eat at least 4 servings of fruit and vegetables a day, include whole grains    & fiber and avoid regularly eating high fat or \"junk\" foods?  Yes    Taking medications regularly:  Yes    Medication side effects:  None    Ability to successfully perform activities of daily living:  No assistance needed    Home Safety:  No safety concerns " identified    Hearing Impairment:  No hearing concerns    In the past 6 months, have you been bothered by leaking of urine?  No    In general, how would you rate your overall mental or emotional health?  Excellent    Additional concerns today:  Yes          Have you ever done Advance Care Planning? (For example, a Health Directive, POLST, or a discussion with a medical provider or your loved ones about your wishes): Yes, advance care planning is on file.       Fall risk  Fallen 2 or more times in the past year?: No  Any fall with injury in the past year?: No    Cognitive Screening   1) Repeat 3 items (Leader, Season, Table)    2) Clock draw: NORMAL  3) 3 item recall: Recalls 3 objects  Results: 3 items recalled: COGNITIVE IMPAIRMENT LESS LIKELY    Mini-CogTM Copyright TABITHA Keene. Licensed by the author for use in [facility]; reprinted with permission (leigh ann@[facility]). All rights reserved.      Do you have sleep apnea, excessive snoring or daytime drowsiness? : no    Reviewed and updated as needed this visit by clinical staff                  Reviewed and updated as needed this visit by Provider                 Social History     Tobacco Use    Smoking status: Never    Smokeless tobacco: Never   Substance Use Topics    Alcohol use: No             12/29/2023    11:19 AM   Alcohol Use   Prescreen: >3 drinks/day or >7 drinks/week? No     Do you have a current opioid prescription? No  Do you use any other controlled substances or medications that are not prescribed by a provider? None          Hypothyroidism Follow-up    Since last visit, patient describes the following symptoms: Weight stable, no hair loss, no skin changes, no constipation, no loose stools and loose stools    Current providers sharing in care for this patient include:   Patient Care Team:  Ernestina Figueroa MD as PCP - General  Ernestina Figueroa MD as Assigned PCP  Yeo, Albert, MD as Assigned Musculoskeletal Provider  Triston Moore DPM as  Assigned Surgical Provider    The following health maintenance items are reviewed in Epic and correct as of today:  Health Maintenance   Topic Date Due    RSV VACCINE (Pregnancy & 60+) (1 - 1-dose 60+ series) Never done    ZOSTER IMMUNIZATION (3 of 3) 11/26/2021    Pneumococcal Vaccine: 65+ Years (2 of 2 - PPSV23 or PCV20) 10/01/2022    INFLUENZA VACCINE (1) 09/01/2023    COVID-19 Vaccine (4 - 2023-24 season) 09/01/2023    DTAP/TDAP/TD IMMUNIZATION (2 - Td or Tdap) 09/12/2023    DEXA  11/05/2023    ANNUAL REVIEW OF HM ORDERS  12/09/2023    PHQ-2 (once per calendar year)  01/01/2024    MEDICARE ANNUAL WELLNESS VISIT  12/09/2023    TSH W/FREE T4 REFLEX  03/13/2024    MAMMO SCREENING  10/13/2024    FALL RISK ASSESSMENT  01/05/2025    LIPID  12/09/2027    ADVANCE CARE PLANNING  12/21/2027    COLORECTAL CANCER SCREENING  10/04/2029    HEPATITIS C SCREENING  Completed    IPV IMMUNIZATION  Aged Out    HPV IMMUNIZATION  Aged Out    MENINGITIS IMMUNIZATION  Aged Out    RSV MONOCLONAL ANTIBODY  Aged Out    PAP  Discontinued     Labs reviewed in EPIC      FHS-7:       10/1/2021    10:19 AM 10/1/2021    10:22 AM 10/6/2022    11:37 AM 12/8/2022    11:03 AM 10/13/2023    10:35 AM 12/29/2023    11:21 AM   Breast CA Risk Assessment (FHS-7)   Did any of your first-degree relatives have breast or ovarian cancer? No  No No No No   Did any of your relatives have bilateral breast cancer? No  No No No No   Did any man in your family have breast cancer? No  No No No No   Did any woman in your family have breast and ovarian cancer? No  No No No No   Did any woman in your family have breast cancer before age 50 y? No  No No No No   Do you have 2 or more relatives with breast and/or ovarian cancer? No  No No No No   Do you have 2 or more relatives with breast and/or bowel cancer? No Yes Yes No No Yes       Pertinent mammograms are reviewed under the imaging tab.    Review of Systems   Constitutional:  Negative for chills and fever.   HENT:   "Positive for hearing loss. Negative for congestion, ear pain and sore throat.    Eyes:  Negative for pain and visual disturbance.   Respiratory:  Negative for cough and shortness of breath.    Cardiovascular:  Negative for chest pain, palpitations and peripheral edema.   Gastrointestinal:  Negative for abdominal pain, constipation, diarrhea, heartburn, hematochezia and nausea.   Breasts:  Negative for tenderness, breast mass and discharge.   Genitourinary:  Negative for dysuria, frequency, genital sores, hematuria, pelvic pain, urgency, vaginal bleeding and vaginal discharge.   Musculoskeletal:  Negative for arthralgias, joint swelling and myalgias.   Skin:  Negative for rash.   Neurological:  Negative for dizziness, weakness, headaches and paresthesias.   Psychiatric/Behavioral:  Negative for mood changes. The patient is not nervous/anxious.        Patient has been advised of split billing requirements and indicates understanding: Yes At the check in, at the        COUNSELING:  Reviewed preventive health counseling, as reflected in patient instructions       Regular exercise       Healthy diet/nutrition      BMI:   Estimated body mass index is 25.02 kg/m  as calculated from the following:    Height as of 6/16/23: 1.676 m (5' 6\").    Weight as of 6/16/23: 70.3 kg (155 lb).         She reports that she has never smoked. She has never used smokeless tobacco.      Appropriate preventive services were discussed with this patient, including applicable screening as appropriate for fall prevention, nutrition, physical activity, Tobacco-use cessation, weight loss and cognition.  Checklist reviewing preventive services available has been given to the patient.    Reviewed patients plan of care and provided an AVS. The Basic Care Plan (routine screening as documented in Health Maintenance) for Lety meets the Care Plan requirement. This Care Plan has been established and reviewed with the Patient.          Mi " Janie Pereira MD  [facility]    Identified Health Risks:  I have reviewed Opioid Use Disorder and Substance Use Disorder risk factors and made any needed referrals.

## 2024-01-05 NOTE — PATIENT INSTRUCTIONS
Plan:   Labs today - [address]   2. We will decide about the Levothyroxine dose after the results  3. Bone Density Scan ( DEXA), to be done at [facility] -- call [phone] to schedule it   4. Next colonoscopy - Oct 2024  5.  The following vaccines are recommended for you. Please check with your insurance about coverage.  Some insurances cover better if you have these vaccines at the pharmacy:  -- RSV, Flu, Covid  -- Pneumonia 20 ( preferable) or Pneumonia 23  -- Tetanus vaccine - Td  -- Shingrix vaccine - the newest vaccine for shingles   6. Next ANNUAL EXAM after Jan 6, 2025

## 2024-01-14 ENCOUNTER — HEALTH MAINTENANCE LETTER (OUTPATIENT)
Age: 69
End: 2024-01-14

## 2024-03-18 ENCOUNTER — OFFICE VISIT (OUTPATIENT)
Dept: PODIATRY | Facility: CLINIC | Age: 69
End: 2024-03-18
Payer: COMMERCIAL

## 2024-03-18 ENCOUNTER — ANCILLARY PROCEDURE (OUTPATIENT)
Dept: GENERAL RADIOLOGY | Facility: CLINIC | Age: 69
End: 2024-03-18
Attending: PODIATRIST
Payer: COMMERCIAL

## 2024-03-18 VITALS — SYSTOLIC BLOOD PRESSURE: 118 MMHG | DIASTOLIC BLOOD PRESSURE: 76 MMHG

## 2024-03-18 DIAGNOSIS — S92.352A CLOSED FRACTURE OF FIFTH METATARSAL BONE OF LEFT FOOT, INITIAL ENCOUNTER: ICD-10-CM

## 2024-03-18 DIAGNOSIS — S99.922A FOOT INJURY, LEFT, INITIAL ENCOUNTER: Primary | ICD-10-CM

## 2024-03-18 DIAGNOSIS — S99.922A FOOT INJURY, LEFT, INITIAL ENCOUNTER: ICD-10-CM

## 2024-03-18 PROCEDURE — 28470 CLTX METATARSAL FX WO MNP EA: CPT | Mod: LT | Performed by: PODIATRIST

## 2024-03-18 PROCEDURE — 99213 OFFICE O/P EST LOW 20 MIN: CPT | Mod: 57 | Performed by: PODIATRIST

## 2024-03-18 PROCEDURE — 73630 X-RAY EXAM OF FOOT: CPT | Mod: TC | Performed by: RADIOLOGY

## 2024-03-18 NOTE — LETTER
"    3/18/2024         RE: Lety Baldwin  [address]  [place] [address]        Dear Colleague,    Thank you for referring your patient, Lety Baldwin, to the [facility]. Please see a copy of my visit note below.    Foot & Ankle Surgery  March 18, 2024    CC: \"Injury to left foot\"    I was asked to see Lety Baldwin regarding the chief complaint by:  self    HPI:  Pt is a 68 year old female who presents with above complaint.  The patient fell yesterday around 3:00 in the afternoon.  She struck her face as well as her left foot.  She has pain at the \"upper left side of my foot\" and has difficulty bearing weight on the forefoot.  Pain 10 out of 10.  \"Ice and ibuprofen\" for treatment.  No imaging    ROS:   Pos for CC.  The patient denies current nausea, vomiting, chills, fevers, belly pain, calf pain, chest pain or SOB.  Complete remainder of ROS is otherwise neg.    VITALS:    Vitals:    03/18/24 0853   BP: 118/76       PMH:    Past Medical History:   Diagnosis Date     Disorder of bone and cartilage, unspecified 2004    -2.0 in 2007     Fracture of metatarsal bone of right foot 5/15     Malignant neoplasm of breast (female), unspecified site 8/97    Ductal Carcinoma in situ right     Other specified acquired hypothyroidism      PONV (postoperative nausea and vomiting)        SXHX:    Past Surgical History:   Procedure Laterality Date     BREAST SURGERY  01/18/2018    bilat breast implant exchange     CHOLECYSTECTOMY       COLONOSCOPY N/A 10/8/2014    Procedure: COLONOSCOPY;  Surgeon: Jelani Moran MD;  Location:  GI     COLONOSCOPY N/A 10/4/2019    Procedure: COLONOSCOPY (fv);  Surgeon: Jelani Moran MD;  Location:  GI     ENDOSCOPIC RETROGRADE CHOLANGIOPANCREATOGRAM N/A 1/19/2018    Procedure: COMBINED ENDOSCOPIC RETROGRADE CHOLANGIOPANCREATOGRAPHY, SPHINCTEROTOMY;  ENDOSCOPIC RETROGRADE CHOLANGIOPANCREATOGRAM (ERCP), SPHINCTEROTOMY, STONE EXTRACTION;  " Surgeon: Patsy Mcclure MD;  Location:  OR     ENDOSCOPIC RETROGRADE CHOLANGIOPANCREATOGRAM N/A 1/19/2018    Procedure: COMBINED ENDOSCOPIC RETROGRADE CHOLANGIOPANCREATOGRAPHY, REMOVE STONE/BALLOON;;  Surgeon: Patsy Mcclure MD;  Location:  OR      BIOPSY OF BREAST, OPEN INCISIONAL  1992    Rt     HC DILATION/CURETTAGE DIAG/THER NON OB  1995    D & C     LAPAROSCOPIC CHOLECYSTECTOMY WITH CHOLANGIOGRAMS N/A 1/18/2018    Procedure: LAPAROSCOPIC CHOLECYSTECTOMY WITH CHOLANGIOGRAMS;;  Surgeon: Peter Winslow MD;  Location:  SD     RECONSTRUCT BREAST BILATERAL, IMPLANT PROSTHESIS BILATERAL, COMBINED Bilateral 1/18/2018    Procedure: COMBINED RECONSTRUCT BREAST BILATERAL, IMPLANT PROSTHESIS BILATERAL;  BILATERAL BREAST RECONSTRUCTION WITH EXCHANGE OF GEL IMPLANTS AND CAPSULOTOMIES, LAPAROSCOPIC CHOLECYSTECTOMY WITH CHOLANGIOGRAMS;  Surgeon: Derrell Diaz MD;  Location:  SD     REMOVE AND REPLACE BREAST IMPLANT PROSTHESIS Right 11/7/2019    Procedure: REVISION OF RIGHT BREAST RECONSTRUCTION WITH IMPLANT EXCHANGE WITH CAPSULOTOMIES;  Surgeon: Derrell Diaz MD;  Location:  OR     [facility] NONSPECIFIC PROCEDURE  9/00/97    Right Total Mastectomy- Reconstructive Surgery. Abstracted 5/15/02.        MEDS:    Current Outpatient Medications   Medication     levothyroxine (SYNTHROID/LEVOTHROID) 150 MCG tablet     VIT CALCIUM CITRATE + D TABS 250-62.5 MG-IU OR     No current facility-administered medications for this visit.       ALL:     Allergies   Allergen Reactions     Fesoterodine      Dizziness, dry mouth     Morphine And Related Nausea       FMH:    Family History   Problem Relation Age of Onset     Diabetes Father      Emphysema Father      Osteoporosis Father      Thyroid Disease Father      Hypertension Mother      Alzheimer Disease Mother      Lipids Mother      Cancer - colorectal Mother      Colon Cancer Mother 78        Cause of Death     Hyperlipidemia Mother      Arthritis  Maternal Grandmother      C.A.D. Maternal Grandmother      Cancer - colorectal Maternal Grandfather      Alzheimer Disease Maternal Grandfather      Colon Cancer Maternal Grandfather      Cerebrovascular Disease Paternal Grandmother      Osteoporosis Paternal Grandmother      Thyroid Disease Paternal Grandmother        SocHx:    Social History     Socioeconomic History     Marital status:      Spouse name: Not on file     Number of children: 2     Years of education: Not on file     Highest education level: Not on file   Occupational History     Occupation:    Tobacco Use     Smoking status: Never     Passive exposure: Never     Smokeless tobacco: Never   Vaping Use     Vaping Use: Never used   Substance and Sexual Activity     Alcohol use: No     Drug use: No     Sexual activity: Not Currently     Partners: Male     Birth control/protection: None     Comment:  has had 2 vas.   Other Topics Concern      Service Not Asked     Blood Transfusions Not Asked     Caffeine Concern Not Asked     Occupational Exposure Not Asked     Hobby Hazards Not Asked     Sleep Concern Not Asked     Stress Concern Not Asked     Weight Concern Not Asked     Special Diet Not Asked     Back Care Not Asked     Exercise Yes     Bike Helmet Not Asked     Seat Belt Yes     Self-Exams Yes     Parent/sibling w/ CABG, MI or angioplasty before 65F 55M? No   Social History Narrative     Not on file     Social Determinants of Health     Financial Resource Strain: Low Risk  (12/29/2023)    Financial Resource Strain      Within the past 12 months, have you or your family members you live with been unable to get utilities (heat, electricity) when it was really needed?: No   Food Insecurity: Low Risk  (12/29/2023)    Food Insecurity      Within the past 12 months, did you worry that your food would run out before you got money to buy more?: No      Within the past 12 months, did the food you bought just not last and  you didn t have money to get more?: No   Transportation Needs: Low Risk  (2023)    Transportation Needs      Within the past 12 months, has lack of transportation kept you from medical appointments, getting your medicines, non-medical meetings or appointments, work, or from getting things that you need?: No   Physical Activity: Not on file   Stress: Not on file   Social Connections: Not on file   Interpersonal Safety: Low Risk  (2024)    Interpersonal Safety      Do you feel physically and emotionally safe where you currently live?: Yes      Within the past 12 months, have you been hit, slapped, kicked or otherwise physically hurt by someone?: No      Within the past 12 months, have you been humiliated or emotionally abused in other ways by your partner or ex-partner?: No   Housing Stability: Low Risk  (2023)    Housing Stability      Do you have housing? : Yes      Are you worried about losing your housing?: No           EXAMINATION:  Gen:   No apparent distress  Neuro:   A&Ox3, no deficits  Psych:    Answering questions appropriately for age and situation with normal affect  Head:    NCAT  Eye:    Visual scanning without deficit  Ear:    Response to auditory stimuli wnl  Lung:    Non-labored breathing on RA noted  Abd:    NTND per patient report  Lymph:    Edema/ecchymosis centered along the left fifth metatarsal  Vasc:    Pulses palpable, CFT minimally delayed  Neuro:    Light touch sensation intact to all sensory nerve distributions without paresthesias  Derm:    Neg for nodules, lesions or ulcerations  MSK:    She is very tender along the fifth metatarsal, especially the proximal one third.  Adjacent metatarsals, TMT, anterior calcaneus and peroneal tendons negative.  No ankle pain is seen today  Calf:    Neg for redness, swelling or tenderness      Imagin views weightbearing left foot -there appears to be a nondisplaced fracture at the proximal metaphyseal diaphyseal junction    Assessment:   68 year old female with nondisplaced left fifth metatarsal fracture      Medical Decision Making/Plan:  Discussed etiologies, anatomy and options  1.  Nondisplaced proximal left fifth metatarsal fracture  -I personally reviewed and interpreted the patient's lower extremity history pertinent to today's visit, including imaging/labs, in preparation for initiating a treatment program.  -I personally interpreted and reviewed the x-rays.  There appears to be a nondisplaced fracture of the proximal metaphyseal-diaphyseal junction of the fifth metatarsal  -Weightbearing as tolerated in a walking cast boot (dispense) +/- crutches (dispense)  -RICE/Tylenol as needed based on pain; Tensogrip was dispensed for swelling and pain control  -Follow-up in 4 weeks for reassessment and repeat x-rays  -Metatarsal fracture billing performed today, 3/18/2020    Regarding the CAM walker, the following proper-usage instructions were discussed and dispensed:  1.  Do not sleep with the CAM boot on; 2.  Do not wear during long-distance travel, ie long car rides, plane trips(they were instructed not to drive with it if the involved foot is required to operate a vehicle); 3.  The patient was encouraged to remove the boot throughout the day when off their feet;  4.  They are to have the boot on when ambulating, regardless of WB status      Follow up: 4 weeks or sooner with acute issues      Patient's medical history was reviewed today      Madi Ga DPM FACFAS FACFAOM  Podiatric Foot & Ankle Surgeon  [facility]  [phone]    Disclaimer: This note consists of symbols derived from keyboarding, dictation and/or voice recognition software. As a result, there may be errors in the script that have gone undetected. Please consider this when interpreting information found in this chart.      1.      Again, thank you for allowing me to participate in the care of your patient.        Sincerely,        Madi Ga DPM,  BRIE

## 2024-03-18 NOTE — PROGRESS NOTES
"Foot & Ankle Surgery  March 18, 2024    CC: \"Injury to left foot\"    I was asked to see Lety Baldwin regarding the chief complaint by:  self    HPI:  Pt is a 68 year old female who presents with above complaint.  The patient fell yesterday around 3:00 in the afternoon.  She struck her face as well as her left foot.  She has pain at the \"upper left side of my foot\" and has difficulty bearing weight on the forefoot.  Pain 10 out of 10.  \"Ice and ibuprofen\" for treatment.  No imaging    ROS:   Pos for CC.  The patient denies current nausea, vomiting, chills, fevers, belly pain, calf pain, chest pain or SOB.  Complete remainder of ROS is otherwise neg.    VITALS:    Vitals:    03/18/24 0853   BP: 118/76       PMH:    Past Medical History:   Diagnosis Date    Disorder of bone and cartilage, unspecified 2004    -2.0 in 2007    Fracture of metatarsal bone of right foot 5/15    Malignant neoplasm of breast (female), unspecified site 8/97    Ductal Carcinoma in situ right    Other specified acquired hypothyroidism     PONV (postoperative nausea and vomiting)        SXHX:    Past Surgical History:   Procedure Laterality Date    BREAST SURGERY  01/18/2018    bilat breast implant exchange    CHOLECYSTECTOMY      COLONOSCOPY N/A 10/8/2014    Procedure: COLONOSCOPY;  Surgeon: Jelani Moran MD;  Location:  GI    COLONOSCOPY N/A 10/4/2019    Procedure: COLONOSCOPY (fv);  Surgeon: Jelani Moran MD;  Location:  GI    ENDOSCOPIC RETROGRADE CHOLANGIOPANCREATOGRAM N/A 1/19/2018    Procedure: COMBINED ENDOSCOPIC RETROGRADE CHOLANGIOPANCREATOGRAPHY, SPHINCTEROTOMY;  ENDOSCOPIC RETROGRADE CHOLANGIOPANCREATOGRAM (ERCP), SPHINCTEROTOMY, STONE EXTRACTION;  Surgeon: Patsy Mcclure MD;  Location:  OR    ENDOSCOPIC RETROGRADE CHOLANGIOPANCREATOGRAM N/A 1/19/2018    Procedure: COMBINED ENDOSCOPIC RETROGRADE CHOLANGIOPANCREATOGRAPHY, REMOVE STONE/BALLOON;;  Surgeon: Patsy Mcclure MD;  Location:  OR    " HC BIOPSY OF BREAST, OPEN INCISIONAL  1992    Rt    HC DILATION/CURETTAGE DIAG/THER NON OB  1995    D & C    LAPAROSCOPIC CHOLECYSTECTOMY WITH CHOLANGIOGRAMS N/A 1/18/2018    Procedure: LAPAROSCOPIC CHOLECYSTECTOMY WITH CHOLANGIOGRAMS;;  Surgeon: Peter Winslow MD;  Location:  SD    RECONSTRUCT BREAST BILATERAL, IMPLANT PROSTHESIS BILATERAL, COMBINED Bilateral 1/18/2018    Procedure: COMBINED RECONSTRUCT BREAST BILATERAL, IMPLANT PROSTHESIS BILATERAL;  BILATERAL BREAST RECONSTRUCTION WITH EXCHANGE OF GEL IMPLANTS AND CAPSULOTOMIES, LAPAROSCOPIC CHOLECYSTECTOMY WITH CHOLANGIOGRAMS;  Surgeon: Derrell Diaz MD;  Location:  SD    REMOVE AND REPLACE BREAST IMPLANT PROSTHESIS Right 11/7/2019    Procedure: REVISION OF RIGHT BREAST RECONSTRUCTION WITH IMPLANT EXCHANGE WITH CAPSULOTOMIES;  Surgeon: Derrell Diaz MD;  Location:  OR    [facility] NONSPECIFIC PROCEDURE  9/00/97    Right Total Mastectomy- Reconstructive Surgery. Abstracted 5/15/02.        MEDS:    Current Outpatient Medications   Medication    levothyroxine (SYNTHROID/LEVOTHROID) 150 MCG tablet    VIT CALCIUM CITRATE + D TABS 250-62.5 MG-IU OR     No current facility-administered medications for this visit.       ALL:     Allergies   Allergen Reactions    Fesoterodine      Dizziness, dry mouth    Morphine And Related Nausea       FMH:    Family History   Problem Relation Age of Onset    Diabetes Father     Emphysema Father     Osteoporosis Father     Thyroid Disease Father     Hypertension Mother     Alzheimer Disease Mother     Lipids Mother     Cancer - colorectal Mother     Colon Cancer Mother 78        Cause of Death    Hyperlipidemia Mother     Arthritis Maternal Grandmother     C.A.D. Maternal Grandmother     Cancer - colorectal Maternal Grandfather     Alzheimer Disease Maternal Grandfather     Colon Cancer Maternal Grandfather     Cerebrovascular Disease Paternal Grandmother     Osteoporosis Paternal Grandmother     Thyroid Disease Paternal  Grandmother        SocHx:    Social History     Socioeconomic History    Marital status:      Spouse name: Not on file    Number of children: 2    Years of education: Not on file    Highest education level: Not on file   Occupational History    Occupation:    Tobacco Use    Smoking status: Never     Passive exposure: Never    Smokeless tobacco: Never   Vaping Use    Vaping Use: Never used   Substance and Sexual Activity    Alcohol use: No    Drug use: No    Sexual activity: Not Currently     Partners: Male     Birth control/protection: None     Comment:  has had 2 vas.   Other Topics Concern     Service Not Asked    Blood Transfusions Not Asked    Caffeine Concern Not Asked    Occupational Exposure Not Asked    Hobby Hazards Not Asked    Sleep Concern Not Asked    Stress Concern Not Asked    Weight Concern Not Asked    Special Diet Not Asked    Back Care Not Asked    Exercise Yes    Bike Helmet Not Asked    Seat Belt Yes    Self-Exams Yes    Parent/sibling w/ CABG, MI or angioplasty before 65F 55M? No   Social History Narrative    Not on file     Social Determinants of Health     Financial Resource Strain: Low Risk  (12/29/2023)    Financial Resource Strain     Within the past 12 months, have you or your family members you live with been unable to get utilities (heat, electricity) when it was really needed?: No   Food Insecurity: Low Risk  (12/29/2023)    Food Insecurity     Within the past 12 months, did you worry that your food would run out before you got money to buy more?: No     Within the past 12 months, did the food you bought just not last and you didn t have money to get more?: No   Transportation Needs: Low Risk  (12/29/2023)    Transportation Needs     Within the past 12 months, has lack of transportation kept you from medical appointments, getting your medicines, non-medical meetings or appointments, work, or from getting things that you need?: No   Physical Activity:  Not on file   Stress: Not on file   Social Connections: Not on file   Interpersonal Safety: Low Risk  (2024)    Interpersonal Safety     Do you feel physically and emotionally safe where you currently live?: Yes     Within the past 12 months, have you been hit, slapped, kicked or otherwise physically hurt by someone?: No     Within the past 12 months, have you been humiliated or emotionally abused in other ways by your partner or ex-partner?: No   Housing Stability: Low Risk  (2023)    Housing Stability     Do you have housing? : Yes     Are you worried about losing your housing?: No           EXAMINATION:  Gen:   No apparent distress  Neuro:   A&Ox3, no deficits  Psych:    Answering questions appropriately for age and situation with normal affect  Head:    NCAT  Eye:    Visual scanning without deficit  Ear:    Response to auditory stimuli wnl  Lung:    Non-labored breathing on RA noted  Abd:    NTND per patient report  Lymph:    Edema/ecchymosis centered along the left fifth metatarsal  Vasc:    Pulses palpable, CFT minimally delayed  Neuro:    Light touch sensation intact to all sensory nerve distributions without paresthesias  Derm:    Neg for nodules, lesions or ulcerations  MSK:    She is very tender along the fifth metatarsal, especially the proximal one third.  Adjacent metatarsals, TMT, anterior calcaneus and peroneal tendons negative.  No ankle pain is seen today  Calf:    Neg for redness, swelling or tenderness      Imagin views weightbearing left foot -there appears to be a nondisplaced fracture at the proximal metaphyseal diaphyseal junction    Assessment:  68 year old female with nondisplaced left fifth metatarsal fracture      Medical Decision Making/Plan:  Discussed etiologies, anatomy and options  1.  Nondisplaced proximal left fifth metatarsal fracture  -I personally reviewed and interpreted the patient's lower extremity history pertinent to today's visit, including imaging/labs, in  preparation for initiating a treatment program.  -I personally interpreted and reviewed the x-rays.  There appears to be a nondisplaced fracture of the proximal metaphyseal-diaphyseal junction of the fifth metatarsal  -Weightbearing as tolerated in a walking cast boot (dispense) +/- crutches (dispense)  -RICE/Tylenol as needed based on pain; Tensogrip was dispensed for swelling and pain control  -Follow-up in 4 weeks for reassessment and repeat x-rays  -Metatarsal fracture billing performed today, 3/18/2020    Regarding the CAM walker, the following proper-usage instructions were discussed and dispensed:  1.  Do not sleep with the CAM boot on; 2.  Do not wear during long-distance travel, ie long car rides, plane trips(they were instructed not to drive with it if the involved foot is required to operate a vehicle); 3.  The patient was encouraged to remove the boot throughout the day when off their feet;  4.  They are to have the boot on when ambulating, regardless of WB status      Follow up: 4 weeks or sooner with acute issues      Patient's medical history was reviewed today      Madi Ga DPM FACFAS FACFAOM  Podiatric Foot & Ankle Surgeon  [facility]  [phone]    Disclaimer: This note consists of symbols derived from keyboarding, dictation and/or voice recognition software. As a result, there may be errors in the script that have gone undetected. Please consider this when interpreting information found in this chart.      1.

## 2024-03-18 NOTE — PATIENT INSTRUCTIONS
Thank you for choosing [facility] / Foot & Ankle Surgery!    DR. FORTE'S CLINIC LOCATIONS:     [facility] (Friday) TRIAGE LINE: [phone] [address] [facility]  APPOINTMENTS: [phone]   [place] [address] RADIOLOGY: [phone]    PHYSICAL THERAPY: [phone]    SET UP SURGERY: [phone]   [place] (Mon-Tues AM-Thurs) BILLING QUESTIONS: [phone]   [address] FAX: [phone]   [place] [address] [facility]: [phone]     You are seen today for follow-up on left foot pain after recent fall.  X-rays demonstrated nondisplaced fracture at the fifth metatarsal.  Recommendations:    1.  Weightbearing as tolerated in the boot +/- crutches.  If it simply too symptomatic to bear weight even with the boot, minimize weight on the foot utilizing crutches.  As symptoms allow, you can slowly return to weight on the foot.  Avoid exceeding the pain threshold at the fracture site with time/weight on the foot as this can have a negative impact on healing.  Boot rules: 1.  Do not wear the boot to bed at night; 2.  Do not wear the boot while driving or traveling (e.g. plane flights); 3.  Remove the boot is much as you would like when you are off your feet throughout the day; 4.  The boot should be on when you are up and about    2.  Rest, ice, elevate and utilize Tylenol as needed.  You are given a compression sleeve to help with swelling control;    Follow-up in 4 weeks for reassessment.  Arrive 20 minutes early as we will be getting updated x-rays.  We will progress you back to weight on the foot and back to shoes as the fracture heals.  I anticipate getting back to shoes in approximate 2 to 3 months.

## 2024-03-28 ENCOUNTER — ANCILLARY PROCEDURE (OUTPATIENT)
Dept: BONE DENSITY | Facility: CLINIC | Age: 69
End: 2024-03-28
Attending: INTERNAL MEDICINE
Payer: COMMERCIAL

## 2024-03-28 DIAGNOSIS — Z78.0 ASYMPTOMATIC POSTMENOPAUSAL STATUS: ICD-10-CM

## 2024-03-28 PROCEDURE — 77080 DXA BONE DENSITY AXIAL: CPT | Mod: TC | Performed by: PHYSICIAN ASSISTANT

## 2024-04-03 ENCOUNTER — MYC MEDICAL ADVICE (OUTPATIENT)
Dept: INTERNAL MEDICINE | Facility: CLINIC | Age: 69
End: 2024-04-03
Payer: COMMERCIAL

## 2024-04-03 NOTE — TELEPHONE ENCOUNTER
Patient looking to establish care with Dr Medina.  Sent [employer] message for patient to schedule appointment

## 2024-04-03 NOTE — TELEPHONE ENCOUNTER
Please see my chart message and advise.  Thanks    Please review recent DEXA results and provide recommendations.

## 2024-04-15 ENCOUNTER — OFFICE VISIT (OUTPATIENT)
Dept: PODIATRY | Facility: CLINIC | Age: 69
End: 2024-04-15
Payer: COMMERCIAL

## 2024-04-15 ENCOUNTER — ANCILLARY PROCEDURE (OUTPATIENT)
Dept: GENERAL RADIOLOGY | Facility: CLINIC | Age: 69
End: 2024-04-15
Attending: PODIATRIST
Payer: COMMERCIAL

## 2024-04-15 VITALS — BODY MASS INDEX: 25.18 KG/M2 | WEIGHT: 156 LBS | SYSTOLIC BLOOD PRESSURE: 138 MMHG | DIASTOLIC BLOOD PRESSURE: 88 MMHG

## 2024-04-15 DIAGNOSIS — S99.922A FOOT INJURY, LEFT, INITIAL ENCOUNTER: Primary | ICD-10-CM

## 2024-04-15 DIAGNOSIS — S92.352A CLOSED FRACTURE OF FIFTH METATARSAL BONE OF LEFT FOOT, INITIAL ENCOUNTER: ICD-10-CM

## 2024-04-15 DIAGNOSIS — S99.922A FOOT INJURY, LEFT, INITIAL ENCOUNTER: ICD-10-CM

## 2024-04-15 PROCEDURE — 73630 X-RAY EXAM OF FOOT: CPT | Mod: TC | Performed by: RADIOLOGY

## 2024-04-15 PROCEDURE — 99207 PR FRACTURE CARE IN GLOBAL PERIOD: CPT | Performed by: PODIATRIST

## 2024-04-15 NOTE — PROGRESS NOTES
Foot & Ankle Surgery   April 15, 2024    S:  Pt is seen today for evaluation of nondisplaced left fifth metatarsal fracture.  She was given a walking boot but states that it was too heavy to wear and so she ended up getting 1 online that is much better.  She states her foot is feeling much better.  She has a trip to [place] in the near future.    There were no vitals filed for this visit.'      ROS - Pos for CC.  Patient denies current nausea, vomiting, chills, fevers, belly pain, calf pain, chest pain or SOB.  Complete remainder of ROS it otherwise neg.      PE:  Gen:   No apparent distress  Eye:    Visual scanning without deficit  Ear:    Response to auditory stimuli wnl  Lung:    Non-labored breathing on RA noted  Abd:    NTND per patient report  Lymph:    Neg for pitting/non-pitting edema BLE  Vasc:    Pulses palpable, CFT minimally delayed  Neuro:    Light touch sensation intact to all sensory nerve distributions without paresthesias  Derm:    Neg for nodules, lesions or ulcerations  MSK:    Tenderness on palpation of the proximal left fifth metatarsal fracture but much improved from the previous visit  Calf:    Neg for redness, swelling or tenderness      Imaging: 3 views weightbearing left foot -the fracture line is more pronounced consistent with resorption of bone.  No displacement is seen.  No callus formation is seen    Assessment:  68 year old female with healing nondisplaced proximal left fifth metatarsal fracture      Medical Decision Making/Plan:  Discussed etiologies, anatomy and options  1.  Healing nondisplaced proximal left fifth metatarsal shaft fracture  -I personally interpreted and reviewed the x-rays.  Resorption of bone is seen.  Minimal callus formation.  No displacement  -Clinically the patient is doing much better  -Weightbearing as tolerated in the walking cast boot.  She has a surgical shoe.  She is cleared to transition to this as symptoms allow  -RICE/Tylenol as needed based on  pain  -She will follow-up for reassessment in 4 weeks and repeat x-rays.  Transition back to shoes as exam and imaging allows  -She has a trip to [place] in the near future.  She was advised to remove the boot during the flight  -Metatarsal fracture billing was performed 3/18/2024    Follow up: 4 or sooner with acute issues           Madi Ga DPM [facility] FACFAOM  Podiatric Foot & Ankle Surgeon  [facility]  [phone]    Disclaimer: This note consists of symbols derived from keyboarding, dictation and/or voice recognition software. As a result, there may be errors in the script that have gone undetected. Please consider this when interpreting information found in this chart.

## 2024-04-15 NOTE — LETTER
4/15/2024         RE: Lety Baldwin  [address]  [place] [address]        Dear Colleague,    Thank you for referring your patient, Lety Baldwin, to the [facility] PODIAT[facility]. Please see a copy of my visit note below.    Foot & Ankle Surgery   April 15, 2024    S:  Pt is seen today for evaluation of nondisplaced left fifth metatarsal fracture.  She was given a walking boot but states that it was too heavy to wear and so she ended up getting 1 online that is much better.  She states her foot is feeling much better.  She has a trip to [place] in the near future.    There were no vitals filed for this visit.'      ROS - Pos for CC.  Patient denies current nausea, vomiting, chills, fevers, belly pain, calf pain, chest pain or SOB.  Complete remainder of ROS it otherwise neg.      PE:  Gen:   No apparent distress  Eye:    Visual scanning without deficit  Ear:    Response to auditory stimuli wnl  Lung:    Non-labored breathing on RA noted  Abd:    NTND per patient report  Lymph:    Neg for pitting/non-pitting edema BLE  Vasc:    Pulses palpable, CFT minimally delayed  Neuro:    Light touch sensation intact to all sensory nerve distributions without paresthesias  Derm:    Neg for nodules, lesions or ulcerations  MSK:    Tenderness on palpation of the proximal left fifth metatarsal fracture but much improved from the previous visit  Calf:    Neg for redness, swelling or tenderness      Imaging: 3 views weightbearing left foot -the fracture line is more pronounced consistent with resorption of bone.  No displacement is seen.  No callus formation is seen    Assessment:  68 year old female with healing nondisplaced proximal left fifth metatarsal fracture      Medical Decision Making/Plan:  Discussed etiologies, anatomy and options  1.  Healing nondisplaced proximal left fifth metatarsal shaft fracture  -I personally interpreted and reviewed the x-rays.  Resorption of bone is seen.   Minimal callus formation.  No displacement  -Clinically the patient is doing much better  -Weightbearing as tolerated in the walking cast boot.  She has a surgical shoe.  She is cleared to transition to this as symptoms allow  -RICE/Tylenol as needed based on pain  -She will follow-up for reassessment in 4 weeks and repeat x-rays.  Transition back to shoes as exam and imaging allows  -She has a trip to [place] in the near future.  She was advised to remove the boot during the flight  -Metatarsal fracture billing was performed 3/18/2024    Follow up: 4 or sooner with acute issues           Madi Ga DPM FACFAS FACFAOM  Podiatric Foot & Ankle Surgeon  [facility]  [phone]    Disclaimer: This note consists of symbols derived from keyboarding, dictation and/or voice recognition software. As a result, there may be errors in the script that have gone undetected. Please consider this when interpreting information found in this chart.        Again, thank you for allowing me to participate in the care of your patient.        Sincerely,        Madi Ga DPM, BRIE

## 2024-04-16 ENCOUNTER — VIRTUAL VISIT (OUTPATIENT)
Dept: INTERNAL MEDICINE | Facility: CLINIC | Age: 69
End: 2024-04-16
Payer: COMMERCIAL

## 2024-04-16 DIAGNOSIS — R21 RASH AND NONSPECIFIC SKIN ERUPTION: ICD-10-CM

## 2024-04-16 DIAGNOSIS — M81.0 OSTEOPOROSIS WITHOUT CURRENT PATHOLOGICAL FRACTURE, UNSPECIFIED OSTEOPOROSIS TYPE: Primary | ICD-10-CM

## 2024-04-16 PROCEDURE — 99213 OFFICE O/P EST LOW 20 MIN: CPT | Mod: 95 | Performed by: INTERNAL MEDICINE

## 2024-04-16 RX ORDER — ALENDRONATE SODIUM 70 MG/1
70 TABLET ORAL
Qty: 13 TABLET | Refills: 3 | Status: SHIPPED | OUTPATIENT
Start: 2024-04-16

## 2024-04-16 RX ORDER — HYDROCORTISONE 2.5 %
CREAM (GRAM) TOPICAL 2 TIMES DAILY
Qty: 30 G | Refills: 0 | Status: SHIPPED | OUTPATIENT
Start: 2024-04-16 | End: 2024-07-09

## 2024-04-16 NOTE — PATIENT INSTRUCTIONS
Plan:  Alendronate 70 mg daily   2. Continue Calcium /vit D 600/800 twice a day   3. During winter time take additional 1000 units vitamin D  4. Hydrocortisone cream 2.5 % twice a day to the rash  5. Dermato referral

## 2024-04-16 NOTE — PROGRESS NOTES
Lety is a 68 year old who is being evaluated via a billable video visit.    How would you like to obtain your AVS? Mail a copy  If the video visit is dropped, the invitation should be resent by: Text to cell phone: [phone]  Will anyone else be joining your video visit? No        This is a VIDEO ( using [employer])  encounter with the patient.       Location of the provider : office   Location of the patient : home      16:04 --- 16:36          Dr Medina's note      Patient's instructions / PLAN:                                                        Plan:  Alendronate 70 mg daily   2. Continue Calcium /vit D 600/800 twice a day   3. During winter time take additional 1000 units vitamin D  4. Hydrocortisone cream 2.5 % twice a day to the rash  5. Dermato referral          ASSESSMENT & PLAN:                                                      (M81.0) Osteoporosis without current pathological fracture, unspecified osteoporosis type  (primary encounter diagnosis)  Comment:   We discussed about the med, how to take, the advantages and potential side effects. She will check with her dentist before starting this med, to make sure she does not have infection or contraindications. The patient will read also the info from the pharmacy and call back if questions.  We also discussed about avoiding lifting heavy weights, jumping. Weight bearing exercises are beneficial.    Plan: alendronate (FOSAMAX) 70 MG tablet            (R21) Rash and nonspecific skin eruption  Comment:   Plan: hydrocortisone 2.5 % cream, Adult Dermatology          Referral                 Chief complaint:                                                      Osteoporosis     SUBJECTIVE:                                                    History of present illness:    L 5th nondisplaced transverse fracture in the proximal metadiaphysis    Osteoporosis  -- Fosamax [id number] No side effects  -- Ca + dairy daily  -- no smoking  -- pos fam hx   --  sees dentist q6m  -- since the bone density scan: Ca + vit D 600 + 800 bid     Skin rash  -- Pink with reddish ctr  -- no itchy   -- cortisone cream didn't help  -- bilat back   -- on the back bit also on the chin    aHrry Davidson is a 68 year old, presenting for the following health issues:  Patient would like to discuss treatment options for osteoporosis.      4/16/2024     9:05 AM   Additional Questions   Roomed by Sosa Childs         History of Present Illness       Reason for visit:  Follow up to Bone Density Test  Symptom onset:  More than a month  Symptoms include:  Test Results  Symptom intensity:  Mild  Symptom progression:  Staying the same  Had these symptoms before:  Yes    She eats 2-3 servings of fruits and vegetables daily.She consumes 0 sweetened beverage(s) daily.She exercises with enough effort to increase her heart rate 20 to 29 minutes per day.  She exercises with enough effort to increase her heart rate 6 days per week.   She is taking medications regularly.         Review of Systems:                                                      ROS: negative for fever, chills, cough, wheezes, chest pain, shortness of breath, vomiting, abdominal pain, leg swelling          OBJECTIVE:           An actual physical exam can't be done during phone visit   A limited exam can sometimes be performed by video visit   NAd      PMHx: reviewed  Past Medical History:   Diagnosis Date    Disorder of bone and cartilage, unspecified 2004    -2.0 in 2007    Fracture of metatarsal bone of right foot 5/15    Malignant neoplasm of breast (female), unspecified site 8/97    Ductal Carcinoma in situ right    Other specified acquired hypothyroidism     PONV (postoperative nausea and vomiting)       PSHx: reviewed  Past Surgical History:   Procedure Laterality Date    BREAST SURGERY  01/18/2018    bilat breast implant exchange    CHOLECYSTECTOMY      COLONOSCOPY N/A 10/8/2014    Procedure: COLONOSCOPY;  Surgeon: Dean  Jelani GARCIA MD;  Location:  GI    COLONOSCOPY N/A 10/4/2019    Procedure: COLONOSCOPY (fv);  Surgeon: Jelani Moran MD;  Location:  GI    ENDOSCOPIC RETROGRADE CHOLANGIOPANCREATOGRAM N/A 1/19/2018    Procedure: COMBINED ENDOSCOPIC RETROGRADE CHOLANGIOPANCREATOGRAPHY, SPHINCTEROTOMY;  ENDOSCOPIC RETROGRADE CHOLANGIOPANCREATOGRAM (ERCP), SPHINCTEROTOMY, STONE EXTRACTION;  Surgeon: Patsy Mcclure MD;  Location:  OR    ENDOSCOPIC RETROGRADE CHOLANGIOPANCREATOGRAM N/A 1/19/2018    Procedure: COMBINED ENDOSCOPIC RETROGRADE CHOLANGIOPANCREATOGRAPHY, REMOVE STONE/BALLOON;;  Surgeon: Patsy Mcclure MD;  Location:  OR     BIOPSY OF BREAST, OPEN INCISIONAL  1992    Rt    HC DILATION/CURETTAGE DIAG/THER NON OB  1995    D & C    LAPAROSCOPIC CHOLECYSTECTOMY WITH CHOLANGIOGRAMS N/A 1/18/2018    Procedure: LAPAROSCOPIC CHOLECYSTECTOMY WITH CHOLANGIOGRAMS;;  Surgeon: Peter Winslow MD;  Location:  SD    RECONSTRUCT BREAST BILATERAL, IMPLANT PROSTHESIS BILATERAL, COMBINED Bilateral 1/18/2018    Procedure: COMBINED RECONSTRUCT BREAST BILATERAL, IMPLANT PROSTHESIS BILATERAL;  BILATERAL BREAST RECONSTRUCTION WITH EXCHANGE OF GEL IMPLANTS AND CAPSULOTOMIES, LAPAROSCOPIC CHOLECYSTECTOMY WITH CHOLANGIOGRAMS;  Surgeon: Derrell Diaz MD;  Location:  SD    REMOVE AND REPLACE BREAST IMPLANT PROSTHESIS Right 11/7/2019    Procedure: REVISION OF RIGHT BREAST RECONSTRUCTION WITH IMPLANT EXCHANGE WITH CAPSULOTOMIES;  Surgeon: Derrell Diaz MD;  Location:  OR    ZC NONSPECIFIC PROCEDURE  9/00/97    Right Total Mastectomy- Reconstructive Surgery. Abstracted 5/15/02.        Meds: reviewed  Current Outpatient Medications   Medication Sig Dispense Refill    levothyroxine (SYNTHROID/LEVOTHROID) 150 MCG tablet Take 1 tablet (150 mcg) by mouth daily 90 tablet 3       Soc Hx: reviewed  Fam Hx: reviewed        Chart documentation was completed, in part, with [employer] voice-recognition software. Even though  reviewed, some grammatical, spelling, and word errors may remain.    Mi Medina MD  Internal Medicine     Signed Electronically by: Mi Pereira MD

## 2024-04-30 ENCOUNTER — TRANSFERRED RECORDS (OUTPATIENT)
Dept: HEALTH INFORMATION MANAGEMENT | Facility: CLINIC | Age: 69
End: 2024-04-30
Payer: COMMERCIAL

## 2024-05-13 ENCOUNTER — ANCILLARY PROCEDURE (OUTPATIENT)
Dept: GENERAL RADIOLOGY | Facility: CLINIC | Age: 69
End: 2024-05-13
Attending: PODIATRIST
Payer: COMMERCIAL

## 2024-05-13 ENCOUNTER — OFFICE VISIT (OUTPATIENT)
Dept: PODIATRY | Facility: CLINIC | Age: 69
End: 2024-05-13
Payer: COMMERCIAL

## 2024-05-13 VITALS — DIASTOLIC BLOOD PRESSURE: 65 MMHG | BODY MASS INDEX: 25.34 KG/M2 | SYSTOLIC BLOOD PRESSURE: 110 MMHG | WEIGHT: 157 LBS

## 2024-05-13 DIAGNOSIS — S92.352A CLOSED FRACTURE OF FIFTH METATARSAL BONE OF LEFT FOOT, INITIAL ENCOUNTER: ICD-10-CM

## 2024-05-13 DIAGNOSIS — S99.922A FOOT INJURY, LEFT, INITIAL ENCOUNTER: ICD-10-CM

## 2024-05-13 DIAGNOSIS — S99.922A FOOT INJURY, LEFT, INITIAL ENCOUNTER: Primary | ICD-10-CM

## 2024-05-13 PROCEDURE — 99207 PR FRACTURE CARE IN GLOBAL PERIOD: CPT | Performed by: PODIATRIST

## 2024-05-13 PROCEDURE — 73630 X-RAY EXAM OF FOOT: CPT | Mod: TC | Performed by: RADIOLOGY

## 2024-05-13 NOTE — LETTER
5/13/2024         RE: Lety Baldwin  [address]  [place] [address]        Dear Colleague,    Thank you for referring your patient, Lety Baldwin, to the [facility]ODI[facility]. Please see a copy of my visit note below.    Foot & Ankle Surgery   May 13, 2024    S:  Pt is seen today for evaluation of left fifth metatarsal fracture.  She was last seen on 4/15/2024 at which point her pain was much improved.  She states the foot continues to feel better.  However, she recently tried to go back to regular shoes and started having some discomfort at the metatarsal fracture site somewhat quickly.  She was placed on generic Fosamax and advised to increase calcium intake.  She notices that the lateral left midfoot is a bit more swollen than the lateral right midfoot.  Her vitamin D drawn on 1/5/2024 was 27    Vitals:    05/13/24 0758   BP: 110/65   Weight: 71.2 kg (157 lb)   '      ROS - Pos for CC.  Patient denies current nausea, vomiting, chills, fevers, belly pain, calf pain, chest pain or SOB.  Complete remainder of ROS it otherwise neg.      PE:  Gen:   No apparent distress  Eye:    Visual scanning without deficit  Ear:    Response to auditory stimuli wnl  Lung:    Non-labored breathing on RA noted  Abd:    NTND per patient report  Lymph:    Neg for pitting/non-pitting edema BLE  Vasc:    Pulses palpable, CFT minimally delayed  Neuro:    Light touch sensation intact to all sensory nerve distributions without paresthesias  Derm:    Neg for nodules, lesions or ulcerations  MSK:   Left lower extremity -no pain on palpation today along the left fifth metatarsal base fracture  Calf:    Neg for redness, swelling or tenderness      Imaging: 3 views weightbearing -progressive healing is seen at the fracture line which is less visible today and we now see callus formation on the oblique and lateral views.    Assessment:  68 year old female with healing nondisplaced left fifth metatarsal base  Iliana Rodriguez called from [employer]. They need a new standing order for PT INR for 6 months as needed. Pls fax to [phone]. fracture      Medical Decision Making/Plan:  Discussed etiologies, anatomy and options  1.  Healing nondisplaced left fifth metatarsal base fracture  -I personally interpreted and reviewed the x-rays.  The fracture line is less visible and there is no adjacent callus formation  -Clinically she is doing well.  Continue weightbearing as tolerated in the boot.  Once she is asymptomatic in the boot, she can gradually transition back to regular shoe as symptoms allow with care to avoid exceeding the pain threshold at the fracture site with time/weight on the foot, as this can have a negative impact on healing  -Metatarsal fracture billing performed 3/18/2024  -She mentions right sided orbital pain.  She states she fell and struck her face at the time of the original injury.  She states this has improved but has not resolved.  I think continue to monitor is certainly appropriate.  She denies any vision or eye tracking issues, nor does she mention headache.  We discussed talking with her primary doctor if this stagnates    Follow up: 4 weeks or sooner with acute issues           Madi Ga DPM [facility] FACFAOM  Podiatric Foot & Ankle Surgeon  [facility]  [phone]    Disclaimer: This note consists of symbols derived from keyboarding, dictation and/or voice recognition software. As a result, there may be errors in the script that have gone undetected. Please consider this when interpreting information found in this chart.        Again, thank you for allowing me to participate in the care of your patient.        Sincerely,        Madi Ga DPM, BRIE

## 2024-05-13 NOTE — PROGRESS NOTES
Addended by: EJ MORILLO on: 11/9/2021 05:31 PM     Modules accepted: Orders, SmartSet     Foot & Ankle Surgery   May 13, 2024    S:  Pt is seen today for evaluation of left fifth metatarsal fracture.  She was last seen on 4/15/2024 at which point her pain was much improved.  She states the foot continues to feel better.  However, she recently tried to go back to regular shoes and started having some discomfort at the metatarsal fracture site somewhat quickly.  She was placed on generic Fosamax and advised to increase calcium intake.  She notices that the lateral left midfoot is a bit more swollen than the lateral right midfoot.  Her vitamin D drawn on 1/5/2024 was 27    Vitals:    05/13/24 0758   BP: 110/65   Weight: 71.2 kg (157 lb)   '      ROS - Pos for CC.  Patient denies current nausea, vomiting, chills, fevers, belly pain, calf pain, chest pain or SOB.  Complete remainder of ROS it otherwise neg.      PE:  Gen:   No apparent distress  Eye:    Visual scanning without deficit  Ear:    Response to auditory stimuli wnl  Lung:    Non-labored breathing on RA noted  Abd:    NTND per patient report  Lymph:    Neg for pitting/non-pitting edema BLE  Vasc:    Pulses palpable, CFT minimally delayed  Neuro:    Light touch sensation intact to all sensory nerve distributions without paresthesias  Derm:    Neg for nodules, lesions or ulcerations  MSK:   Left lower extremity -no pain on palpation today along the left fifth metatarsal base fracture  Calf:    Neg for redness, swelling or tenderness      Imaging: 3 views weightbearing -progressive healing is seen at the fracture line which is less visible today and we now see callus formation on the oblique and lateral views.    Assessment:  68 year old female with healing nondisplaced left fifth metatarsal base fracture      Medical Decision Making/Plan:  Discussed etiologies, anatomy and options  1.  Healing nondisplaced left fifth metatarsal base fracture  -I personally interpreted and reviewed the x-rays.  The fracture line is less visible and there is no adjacent  callus formation  -Clinically she is doing well.  Continue weightbearing as tolerated in the boot.  Once she is asymptomatic in the boot, she can gradually transition back to regular shoe as symptoms allow with care to avoid exceeding the pain threshold at the fracture site with time/weight on the foot, as this can have a negative impact on healing  -Metatarsal fracture billing performed 3/18/2024  -She mentions right sided orbital pain.  She states she fell and struck her face at the time of the original injury.  She states this has improved but has not resolved.  I think continue to monitor is certainly appropriate.  She denies any vision or eye tracking issues, nor does she mention headache.  We discussed talking with her primary doctor if this stagnates    Follow up: 4 weeks or sooner with acute issues           Madi Ga DPM FACFAS FACFAOM  Podiatric Foot & Ankle Surgeon  [facility]  [phone]    Disclaimer: This note consists of symbols derived from keyboarding, dictation and/or voice recognition software. As a result, there may be errors in the script that have gone undetected. Please consider this when interpreting information found in this chart.

## 2024-05-21 ENCOUNTER — MYC MEDICAL ADVICE (OUTPATIENT)
Dept: INTERNAL MEDICINE | Facility: CLINIC | Age: 69
End: 2024-05-21
Payer: COMMERCIAL

## 2024-05-21 NOTE — TELEPHONE ENCOUNTER
Please see patient mychart message.     Is an appointment appropriate or should patient give more time to heal?    Should we be scheduling an appointment for patient based on the fall?

## 2024-05-23 ENCOUNTER — OFFICE VISIT (OUTPATIENT)
Dept: INTERNAL MEDICINE | Facility: CLINIC | Age: 69
End: 2024-05-23
Payer: COMMERCIAL

## 2024-05-23 VITALS
OXYGEN SATURATION: 97 % | SYSTOLIC BLOOD PRESSURE: 113 MMHG | DIASTOLIC BLOOD PRESSURE: 73 MMHG | WEIGHT: 160 LBS | BODY MASS INDEX: 25.71 KG/M2 | HEIGHT: 66 IN | RESPIRATION RATE: 18 BRPM | TEMPERATURE: 97.9 F | HEART RATE: 102 BPM

## 2024-05-23 DIAGNOSIS — S09.90XA CLOSED HEAD INJURY, INITIAL ENCOUNTER: Primary | ICD-10-CM

## 2024-05-23 PROCEDURE — 99213 OFFICE O/P EST LOW 20 MIN: CPT

## 2024-05-23 PROCEDURE — G2211 COMPLEX E/M VISIT ADD ON: HCPCS

## 2024-05-23 RX ORDER — CLOBETASOL PROPIONATE 0.5 MG/G
OINTMENT TOPICAL
COMMUNITY
Start: 2024-04-30

## 2024-05-23 RX ORDER — RESPIRATORY SYNCYTIAL VIRUS VACCINE 120MCG/0.5
0.5 KIT INTRAMUSCULAR ONCE
Qty: 1 EACH | Refills: 0 | Status: CANCELLED | OUTPATIENT
Start: 2024-05-23 | End: 2024-05-23

## 2024-05-23 NOTE — PATIENT INSTRUCTIONS
Iliana Tate is the Same Day/Acute provider for the [place] [facility] department.   -She is available Tuesday-Friday for acute or new conditions.   -Acute issues include but not limited to: Urinary symptoms, new illness, dizziness, heart palpitations, Pre-op, ER/UC/Hosp follow up, Blood pressure concerns, uncontrolled anxiety/depression, etc.   -She does not establish care or do preventative visits.   -Please continue to see your PCP for preventative visits as well as chronic condition management.   Consider seeing Iliana for acute issues before going to UC or the ER.   When calling [phone], please ask to be transferred to the Internal medicine clinic in [place] to get scheduled.

## 2024-05-23 NOTE — PROGRESS NOTES
Assessment & Plan     (S09.90XA) Closed head injury, initial encounter  (primary encounter diagnosis)  Comment: Patient presents to the clinic with a chief complaint of a potential head injury that happened on March 17.  Patient did not go to the emergency room to get evaluated.  She also did break her left fifth metatarsal bone.  She is currently wearing a boot.  She presents to the clinic today for ongoing tenderness to palpation.  She denies any headache, vision changes or pain without palpation.  Clinical picture suggest possibly deep tissue injury that will need additional time for healing.  Plan: Continue to monitor and if no improvement in the next couple of weeks to return to the clinic for further evaluation.                Harry Davidson is a 68 year old, presenting for the following health issues: Patient fell in March and hit the right side of her face near her eye. She also broke her left 5th metatarsal.   She was fairly certain she didn't get a concussion.   There is some tenderness on her eye while putting make-up.   No headaches.   No pain unless she is touching it.   No vision changes.  She wears a contact on her right eye and has no troubles with that.       Fall (Right, upper brow pain after a fall)      5/23/2024     1:53 PM   Additional Questions   Roomed by Radha GRANDE CMA     Fall    History of Present Illness       Reason for visit:  I fell on 3/17 and hit my head above my eye - still have a tender spot  Symptom onset:  More than a month  Symptoms include:  Sore spot above my eye  Symptom intensity:  Mild  Symptom progression:  Staying the same  Had these symptoms before:  No  What makes it worse:  No  What makes it better:  No    She eats 2-3 servings of fruits and vegetables daily.She consumes 0 sweetened beverage(s) daily.She exercises with enough effort to increase her heart rate 20 to 29 minutes per day.  She exercises with enough effort to increase her heart rate 5 days per week.  "  She is taking medications regularly.                 Review of Systems  Constitutional, HEENT, cardiovascular, pulmonary, gi and gu systems are negative, except as otherwise noted.      Objective    /73 (BP Location: Right arm, Patient Position: Sitting, Cuff Size: Adult Large)   Pulse 102   Temp 97.9  F (36.6  C) (Tympanic)   Resp 18   Ht 1.676 m (5' 6\")   Wt 72.6 kg (160 lb)   LMP 01/01/2004 (Approximate)   SpO2 97%   Breastfeeding No   BMI 25.82 kg/m    Body mass index is 25.82 kg/m .  Physical Exam   GENERAL: alert and no distress  NECK: no adenopathy, no asymmetry, masses, or scars  RESP: lungs clear to auscultation - no rales, rhonchi or wheezes  CV: regular rate and rhythm, normal S1 S2, no S3 or S4, no murmur, click or rub, no peripheral edema  ABDOMEN: soft, nontender, no hepatosplenomegaly, no masses and bowel sounds normal  MS: no gross musculoskeletal defects noted, no edema  NEURO: Normal strength and tone, mentation intact and speech normal            Signed Electronically by: HAMILTON Chavira CNP    "

## 2024-05-28 ENCOUNTER — TRANSFERRED RECORDS (OUTPATIENT)
Dept: HEALTH INFORMATION MANAGEMENT | Facility: CLINIC | Age: 69
End: 2024-05-28
Payer: COMMERCIAL

## 2024-06-06 ENCOUNTER — PATIENT OUTREACH (OUTPATIENT)
Dept: CARE COORDINATION | Facility: CLINIC | Age: 69
End: 2024-06-06
Payer: COMMERCIAL

## 2024-06-10 ENCOUNTER — ANCILLARY PROCEDURE (OUTPATIENT)
Dept: GENERAL RADIOLOGY | Facility: CLINIC | Age: 69
End: 2024-06-10
Attending: PODIATRIST
Payer: COMMERCIAL

## 2024-06-10 ENCOUNTER — OFFICE VISIT (OUTPATIENT)
Dept: PODIATRY | Facility: CLINIC | Age: 69
End: 2024-06-10
Payer: COMMERCIAL

## 2024-06-10 VITALS — DIASTOLIC BLOOD PRESSURE: 83 MMHG | SYSTOLIC BLOOD PRESSURE: 120 MMHG | WEIGHT: 160 LBS | BODY MASS INDEX: 25.82 KG/M2

## 2024-06-10 DIAGNOSIS — S99.922A FOOT INJURY, LEFT, INITIAL ENCOUNTER: ICD-10-CM

## 2024-06-10 DIAGNOSIS — S92.352A CLOSED FRACTURE OF FIFTH METATARSAL BONE OF LEFT FOOT, INITIAL ENCOUNTER: ICD-10-CM

## 2024-06-10 DIAGNOSIS — S99.922A FOOT INJURY, LEFT, INITIAL ENCOUNTER: Primary | ICD-10-CM

## 2024-06-10 PROCEDURE — 73630 X-RAY EXAM OF FOOT: CPT | Mod: TC | Performed by: RADIOLOGY

## 2024-06-10 PROCEDURE — 99207 PR FRACTURE CARE IN GLOBAL PERIOD: CPT | Performed by: PODIATRIST

## 2024-06-10 NOTE — LETTER
6/10/2024      Lety Baldwin  [address]  [place] [address]      Dear Colleague,    Thank you for referring your patient, Lety Baldwin, to the [facility] PODI[facility]. Please see a copy of my visit note below.    Foot & Ankle Surgery   Melina 10, 2024    S:  Pt is seen today for evaluation of left fifth metatarsal base fracture.  We are approximately 3 months out from the injury.  Last visit, she was not yet ready to be out of the boot but she currently is in regular shoes and states that last week, the foot started too much better..    Vitals:    06/10/24 0800   BP: 120/83   Weight: 72.6 kg (160 lb)   '      ROS - Pos for CC.  Patient denies current nausea, vomiting, chills, fevers, belly pain, calf pain, chest pain or SOB.  Complete remainder of ROS it otherwise neg.      PE:  Gen:   No apparent distress  Eye:    Visual scanning without deficit  Ear:    Response to auditory stimuli wnl  Lung:    Non-labored breathing on RA noted  Abd:    NTND per patient report  Lymph:    Neg for pitting/non-pitting edema BLE  Vasc:    Pulses palpable, CFT minimally delayed  Neuro:    Light touch sensation intact to all sensory nerve distributions without paresthesias  Derm:    Neg for nodules, lesions or ulcerations  MSK:    Minimal tenderness on palpation of the left fifth metatarsal base fracture  Calf:    Neg for redness, swelling or tenderness      Imaging: 3 views weightbearing left foot -progressive callus formation with maturation noted.  The fracture line is less visible suggestive of healing.    Assessment:  68 year old female with healing left fifth metatarsal base fracture      Medical Decision Making/Plan:  Discussed etiologies, anatomy and options  1.  Healing nondisplaced left fifth metatarsal base fracture  -Radiographically, we will continue to see healing.  The fracture line is less visible and there is maturation of adjacent callus  -Continue comfortable shoes  -RICE/Tylenol as  needed based on pain  -Slow return to activities based on symptoms  -Clinically, the patient is doing quite well.  She is back to regular shoes.  As such, no scheduled follow-up is needed.  However, if this fails to show progression or she notices worsening of her symptoms, I advise she follow-up for repeat x-rays  -Metatarsal fracture billing performed 3/18/2024    Follow up: As needed or sooner with acute issues           Madi Ga DPM [facility]  Podiatric Foot & Ankle Surgeon  [facility]  [phone]    Disclaimer: This note consists of symbols derived from keyboarding, dictation and/or voice recognition software. As a result, there may be errors in the script that have gone undetected. Please consider this when interpreting information found in this chart.        Again, thank you for allowing me to participate in the care of your patient.        Sincerely,        Madi Ga DPM, BRIE

## 2024-06-10 NOTE — PROGRESS NOTES
Foot & Ankle Surgery   Melina 10, 2024    S:  Pt is seen today for evaluation of left fifth metatarsal base fracture.  We are approximately 3 months out from the injury.  Last visit, she was not yet ready to be out of the boot but she currently is in regular shoes and states that last week, the foot started too much better..    Vitals:    06/10/24 0800   BP: 120/83   Weight: 72.6 kg (160 lb)   '      ROS - Pos for CC.  Patient denies current nausea, vomiting, chills, fevers, belly pain, calf pain, chest pain or SOB.  Complete remainder of ROS it otherwise neg.      PE:  Gen:   No apparent distress  Eye:    Visual scanning without deficit  Ear:    Response to auditory stimuli wnl  Lung:    Non-labored breathing on RA noted  Abd:    NTND per patient report  Lymph:    Neg for pitting/non-pitting edema BLE  Vasc:    Pulses palpable, CFT minimally delayed  Neuro:    Light touch sensation intact to all sensory nerve distributions without paresthesias  Derm:    Neg for nodules, lesions or ulcerations  MSK:    Minimal tenderness on palpation of the left fifth metatarsal base fracture  Calf:    Neg for redness, swelling or tenderness      Imaging: 3 views weightbearing left foot -progressive callus formation with maturation noted.  The fracture line is less visible suggestive of healing.    Assessment:  68 year old female with healing left fifth metatarsal base fracture      Medical Decision Making/Plan:  Discussed etiologies, anatomy and options  1.  Healing nondisplaced left fifth metatarsal base fracture  -Radiographically, we will continue to see healing.  The fracture line is less visible and there is maturation of adjacent callus  -Continue comfortable shoes  -RICE/Tylenol as needed based on pain  -Slow return to activities based on symptoms  -Clinically, the patient is doing quite well.  She is back to regular shoes.  As such, no scheduled follow-up is needed.  However, if this fails to show progression or she notices  worsening of her symptoms, I advise she follow-up for repeat x-rays  -Metatarsal fracture billing performed 3/18/2024    Follow up: As needed or sooner with acute issues           Madi Ga DPM [facility] FACFAOM  Podiatric Foot & Ankle Surgeon  [facility]  [phone]    Disclaimer: This note consists of symbols derived from keyboarding, dictation and/or voice recognition software. As a result, there may be errors in the script that have gone undetected. Please consider this when interpreting information found in this chart.

## 2024-06-18 ENCOUNTER — TELEPHONE (OUTPATIENT)
Dept: INTERNAL MEDICINE | Facility: CLINIC | Age: 69
End: 2024-06-18
Payer: COMMERCIAL

## 2024-06-18 ENCOUNTER — MYC MEDICAL ADVICE (OUTPATIENT)
Dept: INTERNAL MEDICINE | Facility: CLINIC | Age: 69
End: 2024-06-18
Payer: COMMERCIAL

## 2024-06-18 NOTE — TELEPHONE ENCOUNTER
Patient Quality Outreach    Patient is due for the following:   Physical Annual Wellness Visit    Next Steps:   Schedule a Annual Wellness Visit    Type of outreach:    Sent [employer] message.    Next Steps:  Reach out within 90 days via Phone.    Max number of attempts reached: No. Will try again in 90 days if patient still on fail list.    Questions for provider review:    None           Sosa Childs

## 2024-06-19 NOTE — TELEPHONE ENCOUNTER
"Per 1/5/25 OV note \"Next ANNUAL EXAM after Jan 6, 2025\"    Summer RN 7:38 AM June 19, 2024   [facility]    "

## 2024-06-29 ENCOUNTER — PATIENT OUTREACH (OUTPATIENT)
Dept: CARE COORDINATION | Facility: CLINIC | Age: 69
End: 2024-06-29
Payer: COMMERCIAL

## 2024-07-09 ENCOUNTER — OFFICE VISIT (OUTPATIENT)
Dept: INTERNAL MEDICINE | Facility: CLINIC | Age: 69
End: 2024-07-09
Payer: COMMERCIAL

## 2024-07-09 VITALS
BODY MASS INDEX: 26.1 KG/M2 | WEIGHT: 162.4 LBS | RESPIRATION RATE: 18 BRPM | TEMPERATURE: 97.5 F | HEART RATE: 83 BPM | SYSTOLIC BLOOD PRESSURE: 123 MMHG | HEIGHT: 66 IN | DIASTOLIC BLOOD PRESSURE: 80 MMHG | OXYGEN SATURATION: 98 %

## 2024-07-09 DIAGNOSIS — M25.512 ACUTE PAIN OF LEFT SHOULDER: Primary | ICD-10-CM

## 2024-07-09 PROCEDURE — 99213 OFFICE O/P EST LOW 20 MIN: CPT | Performed by: INTERNAL MEDICINE

## 2024-07-09 RX ORDER — MELOXICAM 7.5 MG/1
7.5 TABLET ORAL DAILY
Qty: 30 TABLET | Refills: 1 | Status: SHIPPED | OUTPATIENT
Start: 2024-07-09

## 2024-07-09 ASSESSMENT — PAIN SCALES - GENERAL: PAINLEVEL: WORST PAIN (10)

## 2024-07-09 NOTE — PROGRESS NOTES
Dr Medina's note      Patient's instructions / PLAN:                                                        Plan:  Voltaren gel over the counter  2. Meloxicam 7.5 mg daily   3.  Do not take Meloxicam if you take Ibuprofen or Naproxen  4. Physical therapy  5. Ortho referral         ASSESSMENT & PLAN:                                                      (M25.512) Acute pain of left shoulder  (primary encounter diagnosis)  Comment:   Plan: meloxicam (MOBIC) 7.5 MG tablet, Physical         Therapy  Referral, Orthopedic          Referral                 Chief complaint:                                                      L shoulder     SUBJECTIVE:                                                    History of present illness:    L shoulder pain  -- x few weeks, getting worse  -- constant pain 2-4/10 and worse w movements  -- some movements increases the pain to 10/10  -- normal creat     Subjective   Lety is a 68 year old, presenting for the following health issues:  Patient is being seen to discuss left shoulder pain.      7/9/2024     8:35 AM   Additional Questions   Roomed by Sosa Childs     History of Present Illness       Reason for visit:  Left shoulder / arm pain  Symptom onset:  1-2 weeks ago  Symptoms include:  Pain in moving my left arm/shoulder - progressively worse  Symptom intensity:  Moderate  Symptom progression:  Worsening  Had these symptoms before:  No  What makes it worse:  Moving my left arm in certain ways  What makes it better:  Ibuprofen    She eats 2-3 servings of fruits and vegetables daily.She consumes 1 sweetened beverage(s) daily.She exercises with enough effort to increase her heart rate 20 to 29 minutes per day.  She exercises with enough effort to increase her heart rate 5 days per week.   She is taking medications regularly.         Review of Systems:                                                      ROS: negative for fever, chills, cough, wheezes, chest pain,  "shortness of breath, vomiting, abdominal pain, leg swelling       OBJECTIVE:             Physical exam:  Blood pressure 123/80, pulse 83, temperature 97.5  F (36.4  C), temperature source Tympanic, resp. rate 18, height 1.676 m (5' 6\"), weight 73.7 kg (162 lb 6.4 oz), last menstrual period 01/01/2004, SpO2 98%, not currently breastfeeding.     NAD, appears comfortable  Skin: no rashes   Extremities: no edema,   Neurologic: A, Ox3, no focal signs appreciated  MSK: L shoulder w decreased ROM actively and passively     PMHx: reviewed  Past Medical History:   Diagnosis Date    Disorder of bone and cartilage, unspecified 2004    -2.0 in 2007    Fracture of metatarsal bone of right foot 5/15    Malignant neoplasm of breast (female), unspecified site 8/97    Ductal Carcinoma in situ right    Other specified acquired hypothyroidism     PONV (postoperative nausea and vomiting)       PSHx: reviewed  Past Surgical History:   Procedure Laterality Date    BREAST SURGERY  01/18/2018    bilat breast implant exchange    CHOLECYSTECTOMY      COLONOSCOPY N/A 10/8/2014    Procedure: COLONOSCOPY;  Surgeon: Jelani Moran MD;  Location:  GI    COLONOSCOPY N/A 10/4/2019    Procedure: COLONOSCOPY (fv);  Surgeon: Jelani Moran MD;  Location:  GI    ENDOSCOPIC RETROGRADE CHOLANGIOPANCREATOGRAM N/A 1/19/2018    Procedure: COMBINED ENDOSCOPIC RETROGRADE CHOLANGIOPANCREATOGRAPHY, SPHINCTEROTOMY;  ENDOSCOPIC RETROGRADE CHOLANGIOPANCREATOGRAM (ERCP), SPHINCTEROTOMY, STONE EXTRACTION;  Surgeon: Patsy Mcclure MD;  Location:  OR    ENDOSCOPIC RETROGRADE CHOLANGIOPANCREATOGRAM N/A 1/19/2018    Procedure: COMBINED ENDOSCOPIC RETROGRADE CHOLANGIOPANCREATOGRAPHY, REMOVE STONE/BALLOON;;  Surgeon: Patsy Mcclure MD;  Location: [facility] OR    HC BIOPSY OF BREAST, OPEN INCISIONAL  1992    Rt    HC DILATION/CURETTAGE DIAG/THER NON OB  1995    D & C    LAPAROSCOPIC CHOLECYSTECTOMY WITH CHOLANGIOGRAMS N/A 1/18/2018    " Procedure: LAPAROSCOPIC CHOLECYSTECTOMY WITH CHOLANGIOGRAMS;;  Surgeon: Peter Winslow MD;  Location:  [facility]    RECONSTRUCT BREAST BILATERAL, IMPLANT PROSTHESIS BILATERAL, COMBINED Bilateral 1/18/2018    Procedure: COMBINED RECONSTRUCT BREAST BILATERAL, IMPLANT PROSTHESIS BILATERAL;  BILATERAL BREAST RECONSTRUCTION WITH EXCHANGE OF GEL IMPLANTS AND CAPSULOTOMIES, LAPAROSCOPIC CHOLECYSTECTOMY WITH CHOLANGIOGRAMS;  Surgeon: Derrell Diaz MD;  Location:  [facility]    REMOVE AND REPLACE BREAST IMPLANT PROSTHESIS Right 11/7/2019    Procedure: REVISION OF RIGHT BREAST RECONSTRUCTION WITH IMPLANT EXCHANGE WITH CAPSULOTOMIES;  Surgeon: Derrell Diaz MD;  Location:  OR    [facility] NONSPECIFIC PROCEDURE  9/00/97    Right Total Mastectomy- Reconstructive Surgery. Abstracted 5/15/02.        Meds: reviewed  Current Outpatient Medications   Medication Sig Dispense Refill    alendronate (FOSAMAX) 70 MG tablet Take 1 tablet (70 mg) by mouth every 7 days 13 tablet 3    clobetasol (TEMOVATE) 0.05 % external ointment APPLY TWICE DAILY TO RASH ON THE BODY AS NEEDED ON TOP OF VANICREAM      levothyroxine (SYNTHROID/LEVOTHROID) 150 MCG tablet Take 1 tablet (150 mcg) by mouth daily 90 tablet 3       Soc Hx: reviewed  Fam Hx: reviewed      Chart documentation was completed, in part, with [employer] voice-recognition software. Even though reviewed, some grammatical, spelling, and word errors may remain.      Mi Medina MD  Internal Medicine       Signed Electronically by: Mi Pereira MD

## 2024-07-17 NOTE — PROGRESS NOTES
ASSESSMENT & PLAN    Lety was seen today for pain.    Diagnoses and all orders for this visit:    Rotator cuff tendonitis, left  -     Orthopedic  Referral  -     XR Shoulder Left G/E 3 Views; Future  -     Physical Therapy  Referral; Future  -     Large Joint Injection/Arthocentesis: L subacromial bursa    Subacromial impingement, left  -     Physical Therapy  Referral; Future  -     Large Joint Injection/Arthocentesis: L subacromial bursa      This issue is acute and Worsening. Lety presents to clinic to discuss her acute left shoulder pain.  She reports insidious onset of left anterior lateral shoulder pain that will radiate down the upper arm to the elbow.  She particularly notes the pain with reaching backwards with the arm as well as lifting anything out to the side.  Radiographs taken in clinic today were reviewed with the patient and are negative for any degenerative changes or acute findings.  On examination, she has pain with active range of motion, particularly in flexion and abduction as well as pain with rotator cuff testing and subacromial impingement testing.  Reassuringly she has 5/5 strength in all planes and does have full range of motion despite her pain.  We discussed these findings and the treatment options including anti-inflammatory medicines, physical therapy, and corticosteroid injections.  Given the patient's significant pain that has not improved with anti-inflammatory medications, she would likely benefit from a corticosteroid injection today and the patient wishes to proceed with this.  We determined the following plan:  - CSI to the left subacromial bursa performed today under ultrasound guidance, see procedure note below for details  -Physical therapy referral placed today  - She can otherwise use over-the-counter pain medicines as needed  - She can follow-up in our clinic as needed       Aki Patel [facility]  "[place]    -----  Chief Complaint   Patient presents with    Left Shoulder - Pain       SUBJECTIVE  Lety Baldwin is a/an 69 year old female who is seen in consultation at the request of  Mi Cortez M.D. for evaluation of left shoulder pain.     The patient is seen by themselves.  The patient is Right handed    Onset: 1.5 month(s) ago. Patient describes injury as reaching over in bed to grab something  Location of Pain: left posterior shoulder radiating all the way down arm  Worsened by: lifting arm, reaching back, overuse  Better with: rest  Treatments tried: tylenol, ibuprofen, meloxicam, and heat  Associated symptoms: disrupts her sleep, limited range of motion, tingling    Orthopedic/Surgical history: NO  Social History/Occupation:       REVIEW OF SYSTEMS:  Review of systems negative unless mentioned in HPI     OBJECTIVE:  /79   Ht 1.676 m (5' 6\")   Wt 73.5 kg (162 lb)   LMP 01/01/2004 (Approximate)   BMI 26.15 kg/m     General: healthy, alert and in no distress  Skin: no suspicious lesions or rash.  CV: distal perfusion intact   Resp: normal respiratory effort without conversational dyspnea   Psych: normal mood and affect  Gait: NORMAL  Neuro: Normal light sensory exam of NELLI extremity     Shoulder Examination (focused):   Left  Right     Skin intact intact   Inspection No erythema, ecchymosis  No erythema, ecchymosis    Palpation Tenderness: None Tenderness: None   Range of Motion (active) Forward flexion:175   GH Abduction: 90 painful  Reach behind back: T10 painful Forward flexion:175   GH Abduction: 90  Reach behind back: T10   Range of Motion (passive) Forward flexion:175   GH Abduction: 90  ER at side: 70  ER at 90 deg abduction: 90  IR at 90 deg abduction: 60 Forward flexion:175   GH Abduction: 90  ER at side: 70  ER at 90 deg abduction: 90  IR at 90 deg abduction: 60   Crepitus No No   Strength Internal Rotation at side: 5+  External Rotation at side: " 5+ painful  Belly Press: 5+ Internal Rotation at side: 5+  External Rotation at side: 5+  Belly Press: 5+    Special Tests Lea: NP  Neer: Positive  Luz Marina: 5+ painful  Speed's: 5+  Cross arm: Negative  Lea: NP  Neer: NP  Luz Marina: 5+  Speed's: 5+  Cross arm: Negative      Other Positive Tests/ Notable Findings O'yuliana's: NP  Bicep Load I/II: NP  Internal Impingement: NP  Yergason: NP   ERLS: NP  Hornblower: NP  Bear Hug: NP O'yuliana's: NP  Bicep Load I/II: NP  Internal Impingement: NP  Yergason: NP   ERLS: NP  Hornblower: NP  Bear Hug: NP   Instability Generalized laxity: no  Anterior apprehension: Negative  Load and shift (anterior): NP  Load and shift (posterior): NP Generalized laxity: no  Anterior apprehension: Negative  Load and shift (anterior): NP  Load and shift (posterior): NP   Neurologic No abnormal sensation.   Scapular Motion Normal scapular alignment bilaterally without notable protraction/retraction, elevation/depression  No dynamic evidence of scapular dyskinesia           RADIOLOGY:  Final results and radiologist's interpretation, available in the [facility] health record.  Images were reviewed with the patient in the office today.  My personal interpretation of the performed imaging: Normal joint spacing and alignment of the shoulder.  No acute bony abnormalities    Large Joint Injection/Arthocentesis: L subacromial bursa    Date/Time: 7/22/2024 1:26 PM    Performed by: Aki Patel DO  Authorized by: Aki Patel DO    Indications:  Pain  Needle Size:  25 G  Guidance: ultrasound    Approach:  Anterolateral  Location:  Shoulder      Site:  L subacromial bursa  Medications:  6 mg betamethasone acet & sod phos 6 (3-3) MG/ML; 5 mL lidocaine 1 %; 2 mL ROPivacaine 5 MG/ML  Medications comment:  1ml of 8.4% Sodium Bicarbonate solution was used to buffer the local numbing agent for today's injection    Outcome:  Tolerated well, no immediate complications  Procedure discussed: discussed risks, benefits,  and alternatives    Consent Given by:  Patient  Timeout: timeout called immediately prior to procedure    Prep: patient was prepped and draped in usual sterile fashion     Ultrasound was used to ensure safe and accurate needle placement and injection. Ultrasound images of the procedure were permanently stored.

## 2024-07-22 ENCOUNTER — ANCILLARY PROCEDURE (OUTPATIENT)
Dept: GENERAL RADIOLOGY | Facility: CLINIC | Age: 69
End: 2024-07-22
Attending: STUDENT IN AN ORGANIZED HEALTH CARE EDUCATION/TRAINING PROGRAM
Payer: COMMERCIAL

## 2024-07-22 ENCOUNTER — OFFICE VISIT (OUTPATIENT)
Dept: ORTHOPEDICS | Facility: CLINIC | Age: 69
End: 2024-07-22
Attending: INTERNAL MEDICINE
Payer: COMMERCIAL

## 2024-07-22 VITALS
SYSTOLIC BLOOD PRESSURE: 119 MMHG | WEIGHT: 162 LBS | DIASTOLIC BLOOD PRESSURE: 79 MMHG | BODY MASS INDEX: 26.03 KG/M2 | HEIGHT: 66 IN

## 2024-07-22 DIAGNOSIS — M75.42 SUBACROMIAL IMPINGEMENT, LEFT: ICD-10-CM

## 2024-07-22 DIAGNOSIS — M75.82 ROTATOR CUFF TENDONITIS, LEFT: Primary | ICD-10-CM

## 2024-07-22 DIAGNOSIS — M25.512 ACUTE PAIN OF LEFT SHOULDER: ICD-10-CM

## 2024-07-22 PROCEDURE — 20611 DRAIN/INJ JOINT/BURSA W/US: CPT | Mod: LT | Performed by: STUDENT IN AN ORGANIZED HEALTH CARE EDUCATION/TRAINING PROGRAM

## 2024-07-22 PROCEDURE — 73030 X-RAY EXAM OF SHOULDER: CPT | Mod: TC | Performed by: RADIOLOGY

## 2024-07-22 PROCEDURE — 99213 OFFICE O/P EST LOW 20 MIN: CPT | Mod: 25 | Performed by: STUDENT IN AN ORGANIZED HEALTH CARE EDUCATION/TRAINING PROGRAM

## 2024-07-22 RX ORDER — ROPIVACAINE HYDROCHLORIDE 5 MG/ML
2 INJECTION, SOLUTION EPIDURAL; INFILTRATION; PERINEURAL
Status: SHIPPED | OUTPATIENT
Start: 2024-07-22

## 2024-07-22 RX ORDER — BETAMETHASONE SODIUM PHOSPHATE AND BETAMETHASONE ACETATE 3; 3 MG/ML; MG/ML
6 INJECTION, SUSPENSION INTRA-ARTICULAR; INTRALESIONAL; INTRAMUSCULAR; SOFT TISSUE
Status: SHIPPED | OUTPATIENT
Start: 2024-07-22

## 2024-07-22 RX ORDER — LIDOCAINE HYDROCHLORIDE 10 MG/ML
5 INJECTION, SOLUTION INFILTRATION; PERINEURAL
Status: SHIPPED | OUTPATIENT
Start: 2024-07-22

## 2024-07-22 RX ADMIN — BETAMETHASONE SODIUM PHOSPHATE AND BETAMETHASONE ACETATE 6 MG: 3; 3 INJECTION, SUSPENSION INTRA-ARTICULAR; INTRALESIONAL; INTRAMUSCULAR; SOFT TISSUE at 13:26

## 2024-07-22 RX ADMIN — ROPIVACAINE HYDROCHLORIDE 2 ML: 5 INJECTION, SOLUTION EPIDURAL; INFILTRATION; PERINEURAL at 13:26

## 2024-07-22 RX ADMIN — LIDOCAINE HYDROCHLORIDE 5 ML: 10 INJECTION, SOLUTION INFILTRATION; PERINEURAL at 13:26

## 2024-07-22 NOTE — LETTER
7/22/2024      Lety Baldwin  [address]  [place] [address]      Dear Colleague,    Thank you for referring your patient, Lety Baldwin, to the [facility] [place]. Please see a copy of my visit note below.    ASSESSMENT & PLAN    Lety was seen today for pain.    Diagnoses and all orders for this visit:    Rotator cuff tendonitis, left  -     Orthopedic  Referral  -     XR Shoulder Left G/E 3 Views; Future  -     Physical Therapy  Referral; Future  -     Large Joint Injection/Arthocentesis: L subacromial bursa    Subacromial impingement, left  -     Physical Therapy  Referral; Future  -     Large Joint Injection/Arthocentesis: L subacromial bursa      This issue is acute and Worsening. Lety presents to clinic to discuss her acute left shoulder pain.  She reports insidious onset of left anterior lateral shoulder pain that will radiate down the upper arm to the elbow.  She particularly notes the pain with reaching backwards with the arm as well as lifting anything out to the side.  Radiographs taken in clinic today were reviewed with the patient and are negative for any degenerative changes or acute findings.  On examination, she has pain with active range of motion, particularly in flexion and abduction as well as pain with rotator cuff testing and subacromial impingement testing.  Reassuringly she has 5/5 strength in all planes and does have full range of motion despite her pain.  We discussed these findings and the treatment options including anti-inflammatory medicines, physical therapy, and corticosteroid injections.  Given the patient's significant pain that has not improved with anti-inflammatory medications, she would likely benefit from a corticosteroid injection today and the patient wishes to proceed with this.  We determined the following plan:  - CSI to the left subacromial bursa performed today under ultrasound guidance, see  "procedure note below for details  -Physical therapy referral placed today  - She can otherwise use over-the-counter pain medicines as needed  - She can follow-up in our clinic as needed       DO NEHEMIAH [facility] [place]    -----  Chief Complaint   Patient presents with     Left Shoulder - Pain       SUBJECTIVE  Lety Baldwin is a/an 69 year old female who is seen in consultation at the request of  Mi Cortez M.D. for evaluation of left shoulder pain.     The patient is seen by themselves.  The patient is Right handed    Onset: 1.5 month(s) ago. Patient describes injury as reaching over in bed to grab something  Location of Pain: left posterior shoulder radiating all the way down arm  Worsened by: lifting arm, reaching back, overuse  Better with: rest  Treatments tried: tylenol, ibuprofen, meloxicam, and heat  Associated symptoms: disrupts her sleep, limited range of motion, tingling    Orthopedic/Surgical history: NO  Social History/Occupation:       REVIEW OF SYSTEMS:  Review of systems negative unless mentioned in HPI     OBJECTIVE:  /79   Ht 1.676 m (5' 6\")   Wt 73.5 kg (162 lb)   LMP 01/01/2004 (Approximate)   BMI 26.15 kg/m     General: healthy, alert and in no distress  Skin: no suspicious lesions or rash.  CV: distal perfusion intact   Resp: normal respiratory effort without conversational dyspnea   Psych: normal mood and affect  Gait: NORMAL  Neuro: Normal light sensory exam of NELLI extremity     Shoulder Examination (focused):   Left  Right     Skin intact intact   Inspection No erythema, ecchymosis  No erythema, ecchymosis    Palpation Tenderness: None Tenderness: None   Range of Motion (active) Forward flexion:175   GH Abduction: 90 painful  Reach behind back: T10 painful Forward flexion:175   GH Abduction: 90  Reach behind back: T10   Range of Motion (passive) Forward flexion:175   GH Abduction: 90  ER at side: " 70  ER at 90 deg abduction: 90  IR at 90 deg abduction: 60 Forward flexion:175   GH Abduction: 90  ER at side: 70  ER at 90 deg abduction: 90  IR at 90 deg abduction: 60   Crepitus No No   Strength Internal Rotation at side: 5+  External Rotation at side: 5+ painful  Belly Press: 5+ Internal Rotation at side: 5+  External Rotation at side: 5+  Belly Press: 5+    Special Tests Lea: NP  Neer: Positive  Luz Marina: 5+ painful  Speed's: 5+  Cross arm: Negative  Lea: NP  Neer: NP  Luz Marina: 5+  Speed's: 5+  Cross arm: Negative      Other Positive Tests/ Notable Findings O'yuliana's: NP  Bicep Load I/II: NP  Internal Impingement: NP  Yergason: NP   ERLS: NP  Hornblower: NP  Bear Hug: NP O'yuliana's: NP  Bicep Load I/II: NP  Internal Impingement: NP  Yergason: NP   ERLS: NP  Hornblower: NP  Bear Hug: NP   Instability Generalized laxity: no  Anterior apprehension: Negative  Load and shift (anterior): NP  Load and shift (posterior): NP Generalized laxity: no  Anterior apprehension: Negative  Load and shift (anterior): NP  Load and shift (posterior): NP   Neurologic No abnormal sensation.   Scapular Motion Normal scapular alignment bilaterally without notable protraction/retraction, elevation/depression  No dynamic evidence of scapular dyskinesia           RADIOLOGY:  Final results and radiologist's interpretation, available in the [facility] health record.  Images were reviewed with the patient in the office today.  My personal interpretation of the performed imaging: Normal joint spacing and alignment of the shoulder.  No acute bony abnormalities    Large Joint Injection/Arthocentesis: L subacromial bursa    Date/Time: 7/22/2024 1:26 PM    Performed by: Aki Patel DO  Authorized by: Aki Patel DO    Indications:  Pain  Needle Size:  25 G  Guidance: ultrasound    Approach:  Anterolateral  Location:  Shoulder      Site:  L subacromial bursa  Medications:  6 mg betamethasone acet & sod phos 6 (3-3) MG/ML; 5 mL lidocaine 1 %; 2  mL ROPivacaine 5 MG/ML  Medications comment:  1ml of 8.4% Sodium Bicarbonate solution was used to buffer the local numbing agent for today's injection    Outcome:  Tolerated well, no immediate complications  Procedure discussed: discussed risks, benefits, and alternatives    Consent Given by:  Patient  Timeout: timeout called immediately prior to procedure    Prep: patient was prepped and draped in usual sterile fashion     Ultrasound was used to ensure safe and accurate needle placement and injection. Ultrasound images of the procedure were permanently stored.                     Again, thank you for allowing me to participate in the care of your patient.        Sincerely,        Aki Patel, DO

## 2024-07-29 ASSESSMENT — ACTIVITIES OF DAILY LIVING (ADL)
REACHING_FOR_SOMETHING_ON_A_HIGH_SHELF: 8
PUTTING_ON_AN_UNDERSHIRT_OR_A_PULLOVER_SWEATER: 8
TOUCHING_THE_BACK_OF_YOUR_NECK: 6
WHEN_LYING_ON_THE_INVOLVED_SIDE: 8
WASHING_YOUR_HAIR?: 6
AT_ITS_WORST?: 6
PUTTING_ON_YOUR_PANTS: 6
PUSHING_WITH_THE_INVOLVED_ARM: 10
CARRYING_A_HEAVY_OBJECT_OF_10_POUNDS: 4
WASHING_YOUR_BACK: 6
PUTTING_ON_A_SHIRT_THAT_BUTTONS_DOWN_THE_FRONT: 3
REMOVING_SOMETHING_FROM_YOUR_BACK_POCKET: 6
PLACING_AN_OBJECT_ON_A_HIGH_SHELF: 6
PLEASE_INDICATE_YOR_PRIMARY_REASON_FOR_REFERRAL_TO_THERAPY:: SHOULDER

## 2024-08-01 ENCOUNTER — THERAPY VISIT (OUTPATIENT)
Dept: PHYSICAL THERAPY | Facility: CLINIC | Age: 69
End: 2024-08-01
Payer: COMMERCIAL

## 2024-08-01 DIAGNOSIS — M54.2 NECK PAIN: Primary | ICD-10-CM

## 2024-08-01 DIAGNOSIS — M75.42 SUBACROMIAL IMPINGEMENT, LEFT: ICD-10-CM

## 2024-08-01 DIAGNOSIS — M75.82 ROTATOR CUFF TENDONITIS, LEFT: ICD-10-CM

## 2024-08-01 PROCEDURE — 97110 THERAPEUTIC EXERCISES: CPT | Mod: GP | Performed by: PHYSICAL THERAPIST

## 2024-08-01 PROCEDURE — 97162 PT EVAL MOD COMPLEX 30 MIN: CPT | Mod: GP | Performed by: PHYSICAL THERAPIST

## 2024-08-01 NOTE — PROGRESS NOTES
PHYSICAL THERAPY EVALUATION  Type of Visit: Evaluation       Fall Risk Screen:  Fall screen completed by: PT  Have you fallen 2 or more times in the past year?: No  Have you fallen and had an injury in the past year?: Yes  Is patient a fall risk?: No    Subjective       Presenting condition or subjective complaint: Serious shoulder/rotator cuff pain    R handed patient complains of insidious onset L posterior shoulder pain for the past couple of months. The pain is constant but increases with extension, horizontal abduction, specifically when laying in bed reaching for her phone on the nightstand. When she reaches in this direction, the pain increases but also radiates down the posterior arm into her L hand with numbness/tingling occasionally present. Denies neck pain, denies onset of L shoulder/arm pain with cervical motions. Prior to seeing Dr. Patel, she was having difficulty sleeping due to the pain. Now, only wakes if she rolls over onto her L shoulder. Primary care prescribed meloxicam which was not hopeful, nor was Voltaren gel. Is currently using Aleve for pain, but does not like to take medication, as a result, takes only a night. Also using a pillow under the left arm while sleeping. Enjoys walking her dog, 2-3 miles/day, holding the leash in her R hand now.     Prior to shoulder injury had broken her toe and was placed in a boot for several months. This led to inactivity, she typically stands 50% of her day at work, was unable while in the boot. Typically performed strength training exercises and walked, also unable in the boot. It is possible, inactivity and poor posture led to her L shoulder/neck pain.    Date of onset: 07/22/24 (MD nguyen)    Relevant medical history:     Dates & types of surgery:      Prior diagnostic imaging/testing results: X-ray       L Shoulder Xray 7/22/2024  IMPRESSION: Views of the left shoulder are negative for acute fracture  or dislocation. Normal glenohumeral joint space  seen. Small foci of  mineralization adjacent to the humerus greater tuberosity are  suspicious for calcific tendinitis.     CARL JAY MD   Prior therapy history for the same diagnosis, illness or injury: No      Prior Level of Function  Transfers: Independent  Ambulation: Independent  ADL: Independent  IADL:     Living Environment  Social support: Alone   Type of home: [facility]   Stairs to enter the home: No       Ramp: No   Stairs inside the home: Yes 12 Is there a railing: Yes     Help at home: None  Equipment owned:       Employment: Yes   Hobbies/Interests: [employer]    Patient goals for therapy: Have no pain and be able to move freely    Pain assessment: Location: posterior L shoulder/Ratin/10     Objective   SHOULDER EVALUATION  PAIN: Pain Level with Use: 10/10  Pain Quality: Shooting and zaps  INTEGUMENTARY (edema, incisions):   POSTURE: Sitting Posture: Rounded shoulders, Forward head  GAIT:   Weightbearing Status:   Assistive Device(s):   Gait Deviations:   BALANCE/PROPRIOCEPTION:   WEIGHTBEARING ALIGNMENT:   ROM:   (Degrees) Left AROM Left PROM Right AROM  Right PROM   Shoulder Flexion 131 143 180    Shoulder Extension 42  73    Shoulder Abduction 80 101 180    Shoulder Adduction       Shoulder Internal Rotation  Wfl @ 45* abd     Shoulder External Rotation  23 @ 45* abd     Shoulder Horizontal Abduction       Shoulder Horizontal Adduction       Shoulder Flexion ER T3  T3    Shoulder Extension IR L5  T7    Elbow Extension       Elbow Flexion       Pain: ERP all L shoulder motions     STRENGTH:   Pain: - none + mild ++ moderate +++ severe  Strength Scale: 0-5/5 Left Right   Shoulder Flexion +4 slight pain -5   Shoulder Extension 5 5   Shoulder Abduction 4 pain -5   Shoulder Adduction     Shoulder Internal Rotation -5 pain 5   Shoulder External Rotation +4 pain -5   Shoulder Horizontal Abduction     Shoulder Horizontal Adduction     Elbow Flexion     Elbow Extension     Mid  Trap     Lower Trap     Rhomboid     Serratus Anterior       FLEXIBILITY: Decreased upper trap L, Decreased levator L, Decreased pectoralis major L, Decreased pectoralis minor L, Decreased pectoralis major R, Decreased pectoralis minor R  SPECIAL TESTS:    Left Right   Impingement     Neer's Positive Negative    Hawkin's-Js Positive Negative    Coracoid Impingement     Internal impingement     Labral     Anterior Slide     Surry's     Crank     Instability     Apprehension (anterior)     Relocation (anterior)     Anterior Load & Shift     Posterior Load & Shift     Posterior instability (with 90 degrees flex)     Multi-Directional Instability      Sulcus     Biceps      Speed's Positive Negative    Rotator Cuff Tear     Drop Arm Negative  Negative    Belly Press     Lift off          PALPATION:   + Tenderness At Location Left Right   Clavicle     Sternoclavicular     Acromioclavicular     Biceps     Triceps     Supraspinatus - -   Infraspinatus + -   Teres minor + -   Subscapularis     Deltoid     Levator + -   Rhomboids     Upper trap + -   Incisional     Bicipital groove       JOINT MOBILITY:   CERVICAL SCREEN:   (Degrees) Left AROM Right AROM   Cervical Flexion 100%   Cervical Extension 75%   Side bend     Rotation 75% pain 90%   Thoracic Flexion    Thoracic Extension    Thoracic Rotation     Thoracic Outlet Screen (Haim, Adson)     Repeated Cervical Retraction: produces in posterior L shoulder, pain improves through reps     Left Right   Alar Ligament    Cervical Flexion-Rotation     Cervical Rot/Lateral Flex     Compression     Distraction     Spurling s Negative  Negative    Thoracic Outlet Screen (Haim, Adson)     Transverse Ligament     Vertebral Artery         Assessment & Plan   CLINICAL IMPRESSIONS  Medical Diagnosis: Rotator cuff tendonitis, left  Subacromial impingement, left    Treatment Diagnosis: L shoulder pain, neck pain   Impression/Assessment: Patient is a 69 year old female with L  shoulder and neck complaints.  The following significant findings have been identified: Pain, Decreased ROM/flexibility, Decreased strength, Impaired muscle performance, Decreased activity tolerance, and Impaired posture. These impairments interfere with their ability to perform self care tasks, work tasks, recreational activities, household chores, and driving  as compared to previous level of function.     Clinical Decision Making (Complexity):  Clinical Presentation: Evolving/Changing  Clinical Presentation Rationale: based on medical and personal factors listed in PT evaluation  Clinical Decision Making (Complexity): Moderate complexity    PLAN OF CARE  Treatment Interventions:  Interventions: Manual Therapy, Neuromuscular Re-education, Therapeutic Activity, Therapeutic Exercise, Self-Care/Home Management    Long Term Goals     PT Goal 1  Goal Identifier: sleeping  Goal Description: Patient will be able to lay on L shoulder for 30 minutes without increase in pain in order for preferred sleeping position to improve quality of sleep.  Target Date: 09/26/24  PT Goal 2  Goal Identifier: reaching  Goal Description: Patient will be able to flex L shoulder > 165 in order for ADLs and household chores.  Target Date: 09/26/24      Frequency of Treatment: 1x/wk  Duration of Treatment: 8 wks    Recommended Referrals to Other Professionals:   Education Assessment:   Learner/Method: Patient;Listening;Demonstration;Pictures/Video;No Barriers to Learning  Education Comments: Educated on findings of exam, anatomy, likely cervical compenent due to compensation, theory of centralization, importance of HEP, and likely frequency/duration of HEP.    Risks and benefits of evaluation/treatment have been explained.   Patient/Family/caregiver agrees with Plan of Care.     Evaluation Time:     PT Eval, Moderate Complexity Minutes (85454): 25       Signing Clinician: Triny Rodas PT        [facility]                                                                                    OUTPATIENT PHYSICAL THERAPY      PLAN OF TREATMENT FOR OUTPATIENT REHABILITATION   Patient's Last Name, First Name, Lety Childers YOB: 1955   Provider's Name   [facility]   Medical Record No.  [id number]     Onset Date: 07/22/24 (MD order)  Start of Care Date: 08/01/24     Medical Diagnosis:  Rotator cuff tendonitis, left  Subacromial impingement, left      PT Treatment Diagnosis:  L shoulder pain, neck pain Plan of Treatment  Frequency/Duration: 1x/wk/ 8 wks    Certification date from 08/01/24 to 09/26/24         See note for plan of treatment details and functional goals     Triny Rodas, PT                         I CERTIFY THE NEED FOR THESE SERVICES FURNISHED UNDER        THIS PLAN OF TREATMENT AND WHILE UNDER MY CARE     (Physician attestation of this document indicates review and certification of the therapy plan).              Referring Provider:  Aki Patel    Initial Assessment  See Epic Evaluation- Start of Care Date: 08/01/24                 no

## 2024-08-08 ENCOUNTER — THERAPY VISIT (OUTPATIENT)
Dept: PHYSICAL THERAPY | Facility: CLINIC | Age: 69
End: 2024-08-08
Payer: COMMERCIAL

## 2024-08-08 DIAGNOSIS — M54.2 NECK PAIN: ICD-10-CM

## 2024-08-08 DIAGNOSIS — M75.82 ROTATOR CUFF TENDONITIS, LEFT: Primary | ICD-10-CM

## 2024-08-08 DIAGNOSIS — M75.42 SUBACROMIAL IMPINGEMENT, LEFT: ICD-10-CM

## 2024-08-08 PROCEDURE — 97110 THERAPEUTIC EXERCISES: CPT | Mod: GP | Performed by: PHYSICAL THERAPIST

## 2024-08-08 PROCEDURE — 97112 NEUROMUSCULAR REEDUCATION: CPT | Mod: GP | Performed by: PHYSICAL THERAPIST

## 2024-08-22 ENCOUNTER — THERAPY VISIT (OUTPATIENT)
Dept: PHYSICAL THERAPY | Facility: CLINIC | Age: 69
End: 2024-08-22
Payer: COMMERCIAL

## 2024-08-22 DIAGNOSIS — M54.2 NECK PAIN: ICD-10-CM

## 2024-08-22 DIAGNOSIS — M75.82 ROTATOR CUFF TENDONITIS, LEFT: Primary | ICD-10-CM

## 2024-08-22 DIAGNOSIS — M75.42 SUBACROMIAL IMPINGEMENT, LEFT: ICD-10-CM

## 2024-08-22 PROCEDURE — 97110 THERAPEUTIC EXERCISES: CPT | Mod: GP | Performed by: PHYSICAL THERAPIST

## 2024-08-22 PROCEDURE — 97112 NEUROMUSCULAR REEDUCATION: CPT | Mod: GP | Performed by: PHYSICAL THERAPIST

## 2024-08-29 ENCOUNTER — THERAPY VISIT (OUTPATIENT)
Dept: PHYSICAL THERAPY | Facility: CLINIC | Age: 69
End: 2024-08-29
Payer: COMMERCIAL

## 2024-08-29 DIAGNOSIS — M75.42 SUBACROMIAL IMPINGEMENT, LEFT: ICD-10-CM

## 2024-08-29 DIAGNOSIS — M75.82 ROTATOR CUFF TENDONITIS, LEFT: Primary | ICD-10-CM

## 2024-08-29 DIAGNOSIS — M54.2 NECK PAIN: ICD-10-CM

## 2024-08-29 PROCEDURE — 97110 THERAPEUTIC EXERCISES: CPT | Mod: GP | Performed by: PHYSICAL THERAPIST

## 2024-08-29 PROCEDURE — 97112 NEUROMUSCULAR REEDUCATION: CPT | Mod: GP | Performed by: PHYSICAL THERAPIST

## 2024-09-03 ENCOUNTER — TELEPHONE (OUTPATIENT)
Dept: GASTROENTEROLOGY | Facility: CLINIC | Age: 69
End: 2024-09-03
Payer: COMMERCIAL

## 2024-09-03 NOTE — TELEPHONE ENCOUNTER
"Endoscopy Scheduling Screen    Have you had a positive Covid test in the last 14 days?  No    What is your communication preference for Instructions and/or Bowel Prep?   MyChart    What insurance is in the chart?  Other:  [employer]ARE    Ordering/Referring Provider: JT GOMEZ    (If ordering provider performs procedure, schedule with ordering provider unless otherwise instructed. )    BMI: Estimated body mass index is 26.15 kg/m  as calculated from the following:    Height as of 7/22/24: 1.676 m (5' 6\").    Weight as of 7/22/24: 73.5 kg (162 lb).     Sedation Ordered  moderate sedation.   If patient BMI > 50 do not schedule in [facility].    If patient BMI > 45 do not schedule at [facility].    Are you taking methadone or Suboxone?  No    Have you had difficulties, pain, or discomfort during past endoscopy procedures?  No    Are you taking any prescription medications for pain 3 or more times per week?   NO, No RN review required.    Do you have a history of malignant hyperthermia?  No    (Females) Are you currently pregnant?   No     Have you been diagnosed or told you have pulmonary hypertension?   No    Do you have an LVAD?  No    Have you been told you have moderate to severe sleep apnea?  No    Have you been told you have COPD, asthma, or any other lung disease?  No    Do you have any heart conditions?  No     Have you ever had or are you waiting for an organ transplant?  No. Continue scheduling, no site restrictions.    Have you had a stroke or transient ischemic attack (TIA aka \"mini stroke\" in the last 6 months?   No    Have you been diagnosed with or been told you have cirrhosis of the liver?   No    Are you currently on dialysis?   No    Do you need assistance transferring?   No    BMI: Estimated body mass index is 26.15 kg/m  as calculated from the following:    Height as of 7/22/24: 1.676 m (5' 6\").    Weight as of 7/22/24: 73.5 kg (162 lb).     Is patients BMI > 40 and scheduling location " [facility]?  No    Do you take an injectable medication for weight loss or diabetes (excluding insulin)?  No    Do you take the medication Naltrexone?  No    Do you take blood thinners?  No       Prep   Are you currently on dialysis or do you have chronic kidney disease?  No    Do you have a diagnosis of diabetes?  No    Do you have a diagnosis of cystic fibrosis (CF)?  No    On a regular basis do you go 3 -5 days between bowel movements?  No    BMI > 40?  No    Preferred Pharmacy:    [id number] - [place], [place] - [facility] [address] AT [place] [address] [address] [address]  Phone: [phone] Fax: [phone]      Final Scheduling Details     Procedure scheduled  Colonoscopy    Surgeon:  RUBENS     Date of procedure:  10/17/24     Pre-OP / PAC:   No - Not required for this site.    Location  [facility] - Patient preference.    Sedation   Moderate Sedation - Per order.      Patient Reminders:   You will receive a call from a Nurse to review instructions and health history.  This assessment must be completed prior to your procedure.  Failure to complete the Nurse assessment may result in the procedure being cancelled.      On the day of your procedure, please designate an adult(s) who can drive you home stay with you for the next 24 hours. The medicines used in the exam will make you sleepy. You will not be able to drive.      You cannot take public transportation, ride share services, or non-medical taxi service without a responsible caregiver.  Medical transport services are allowed with the requirement that a responsible caregiver will receive you at your destination.  We require that drivers and caregivers are confirmed prior to your procedure.

## 2024-09-12 ENCOUNTER — THERAPY VISIT (OUTPATIENT)
Dept: PHYSICAL THERAPY | Facility: CLINIC | Age: 69
End: 2024-09-12
Payer: COMMERCIAL

## 2024-09-12 DIAGNOSIS — M54.2 NECK PAIN: ICD-10-CM

## 2024-09-12 DIAGNOSIS — M75.82 ROTATOR CUFF TENDONITIS, LEFT: Primary | ICD-10-CM

## 2024-09-12 DIAGNOSIS — M75.42 SUBACROMIAL IMPINGEMENT, LEFT: ICD-10-CM

## 2024-09-12 PROCEDURE — 97140 MANUAL THERAPY 1/> REGIONS: CPT | Mod: GP | Performed by: PHYSICAL THERAPIST

## 2024-09-12 PROCEDURE — 97110 THERAPEUTIC EXERCISES: CPT | Mod: GP | Performed by: PHYSICAL THERAPIST

## 2024-09-13 ENCOUNTER — PATIENT OUTREACH (OUTPATIENT)
Dept: CARE COORDINATION | Facility: CLINIC | Age: 69
End: 2024-09-13
Payer: COMMERCIAL

## 2024-09-26 NOTE — TELEPHONE ENCOUNTER
Standard Miralax Bowel Prep recommended due to standard bowel prep. Instructions were sent via [email or web address].

## 2024-10-07 ENCOUNTER — TELEPHONE (OUTPATIENT)
Dept: GASTROENTEROLOGY | Facility: CLINIC | Age: 69
End: 2024-10-07
Payer: COMMERCIAL

## 2024-10-10 ENCOUNTER — THERAPY VISIT (OUTPATIENT)
Dept: PHYSICAL THERAPY | Facility: CLINIC | Age: 69
End: 2024-10-10
Payer: COMMERCIAL

## 2024-10-10 DIAGNOSIS — M54.2 NECK PAIN: ICD-10-CM

## 2024-10-10 DIAGNOSIS — M75.82 ROTATOR CUFF TENDONITIS, LEFT: Primary | ICD-10-CM

## 2024-10-10 DIAGNOSIS — M75.42 SUBACROMIAL IMPINGEMENT, LEFT: ICD-10-CM

## 2024-10-10 PROCEDURE — 97110 THERAPEUTIC EXERCISES: CPT | Mod: GP | Performed by: PHYSICAL THERAPIST

## 2024-10-10 PROCEDURE — 97140 MANUAL THERAPY 1/> REGIONS: CPT | Mod: GP | Performed by: PHYSICAL THERAPIST

## 2024-10-10 NOTE — PROGRESS NOTES
[facility]                                                                                   OUTPATIENT PHYSICAL THERAPY    PLAN OF TREATMENT FOR OUTPATIENT REHABILITATION   Patient's Last Name, First Name, Lety Childers YOB: 1955   Provider's Name   [facility]   Medical Record No.  [id number]     Onset Date: 07/22/24 (MD order)  Start of Care Date: 08/01/24     Medical Diagnosis:  Rotator cuff tendonitis, left  Subacromial impingement, left      PT Treatment Diagnosis:  L shoulder pain, neck pain Plan of Treatment  Frequency/Duration: 1x/wk/ 8 wks    Certification date from 09/27/24 to 11/22/24         See note for plan of treatment details and functional goals     Triny Rodas PT                         I CERTIFY THE NEED FOR THESE SERVICES FURNISHED UNDER        THIS PLAN OF TREATMENT AND WHILE UNDER MY CARE     (Physician attestation of this document indicates review and certification of the therapy plan).              Referring Provider:  Aki Patel    Initial Assessment  See Epic Evaluation- Start of Care Date: 08/01/24            PLAN  Follow up with Dr. Patel  Continue therapy per current plan of care if continued PT recommended, otherwise discharge.    Beginning/End Dates of Progress Note Reporting Period:  08/01/24 to 10/10/2024    Referring Provider:  Aki Patel       10/10/24 0500   Appointment Info   Signing clinician's name / credentials Triny Rodas PT, DPT   Total/Authorized Visits 8 (E&T)   Visits Used 6   Medical Diagnosis Rotator cuff tendonitis, left  Subacromial impingement, left   PT Tx Diagnosis L shoulder pain, neck pain   Progress Note/Certification   Start of Care Date 08/01/24   Onset of illness/injury or Date of Surgery 07/22/24  (MD order)   Therapy Frequency 1x/wk   Predicted Duration 8 wks   Certification date from 09/27/24   Certification date to 11/22/24   Progress Note Due Date  "11/22/24   Progress Note Completed Date 08/01/24   GOALS   PT Goals 2   PT Goal 1   Goal Identifier sleeping   Goal Description Patient will be able to lay on L shoulder for 30 minutes without increase in pain in order for preferred sleeping position to improve quality of sleep.   Goal Progress able to tolerate a few minutes on her L side to perform stretches in office; has awoken on her L side in pain, but unsure how long she was on her L side   Target Date 11/22/24   PT Goal 2   Goal Identifier reaching   Goal Description Patient will be able to flex L shoulder > 165 in order for ADLs and household chores.   Goal Progress AROM L Shldr Flx: 157   Target Date 11/22/24   Subjective Report   Subjective Report Frusterated with progress of L shoulder, woke up 3 times last night due to pain. Does feel improvement in some areas (overhead reach) but not in other areas, like behind the back reach. Numbness/tingling in L arm has resolved, however, occasionally notes a \"discomfort\" in her L hand, a tightness when clenching/opening her hand.   Objective Measures   Objective Measures Objective Measure 1;Objective Measure 2;Objective Measure 3;Objective Measure 4   Objective Measure 1   Objective Measure AROM Cervical   Details Flx: 90%, Ext: 75%, Rot B: 90%; these do not produce any symptoms in L UE today   Objective Measure 2   Objective Measure AROM L Shldr - ERP all directions   Details Flx: 157 \"tight\", Abd w/ scaption: 164 (sore and tight), Ext: 50 (tight), Flx/ER: T3, Ext/IR: L1 painful   Objective Measure 3   Objective Measure Strength L Shldr   Details Flx: +4/5 pain, Abd: -4/5 much more painful, Ext: 5/5, ER: 5/5, IR: +4/5 slight discomfort   Objective Measure 4   Objective Measure PROM L Shldr   Details Flx: 172, Abd: 125, ER: 62, IR: 59   Treatment Interventions (PT)   Interventions Therapeutic Procedure/Exercise;Neuromuscular Re-education;Manual Therapy   Therapeutic Procedure/Exercise   Therapeutic Procedures: " strength, endurance, ROM, flexibility minutes (25628) 30   Therapeutic Procedures Ther Proc 2   Ther Proc 1 UBE   Ther Proc 2 PROM   Ther Proc 2 - Details Pt in supine, L shoulder all directions to tolerance, ERP noted   PTRx Ther Proc 1 Cervical Retraction   PTRx Ther Proc 1 - Details w/ OP x 10 reps   PTRx Ther Proc 2 Seated Cervical Retraction Extension With Overpressure   PTRx Ther Proc 2 - Details x 10 reps   PTRx Ther Proc 3 [place] Stretch   PTRx Ther Proc 3 - Details L x 5 reps 2nd set arm circles x 5 reps   PTRx Ther Proc 4 Wand Shoulder Extension Standing   PTRx Ther Proc 5 Four Corner Stretch Flexion   PTRx Ther Proc 6 Four [place] Stretch External Rotation With Abduction   PTRx Ther Proc 7 Side-lying Posterior Capsule Stretch   PTRx Ther Proc 7 - Details reviewed   PTRx Ther Proc 8 Scapular Retraction/Depression   PTRx Ther Proc 9 Shoulder External Rotation Sidelying   PTRx Ther Proc 10 Shoulder Scaption Full Can   PTRx Ther Proc 10 - Details 1/2lb x 10 reps 2nd set 1lb x 10 reps vc/vsc for scaption vs abduction   Skilled Intervention Progression of skilled HEP with cues for proper exercise performance and proper muscle activation.   Patient Response/Progress tolerates new stretches, reporting improved ROM with each rep   PTRx Ther Proc 11 Shoulder Horizontal Abduction Standing   Neuromuscular Re-education   PTRx Neuro Re-ed 1 Reverse Pendulum Supine or Sidelying   PTRx Neuro Re-ed 2 Shoulder Theraband Rows   PTRx Neuro Re-ed 3 Shoulder Theraband Low Row/Pulldown   Manual Therapy   Manual Therapy: Mobilization, MFR, MLD, friction massage minutes (55527) 8   Manual Therapy Manual Therapy 2   Manual Therapy 1 Joint Mobilization/MFR   Manual Therapy 2 Joint Mobilization   Manual Therapy 2 - Details Pt in supine, long axis L GH distraction w/ post and inf mobs, grade II-IV with PROM for pain relief and improved ROM   Patient Response/Progress reports relief following manual work   Education    Learner/Method Patient;Listening;Demonstration;Pictures/Video;No Barriers to Learning   Plan   Home program see PTrx   Plan for next session pec stretching, manual scap work, serratus?   Total Session Time   Timed Code Treatment Minutes 38   Total Treatment Time (sum of timed and untimed services) 38

## 2024-10-11 ENCOUNTER — PATIENT OUTREACH (OUTPATIENT)
Dept: CARE COORDINATION | Facility: CLINIC | Age: 69
End: 2024-10-11
Payer: COMMERCIAL

## 2024-10-14 ENCOUNTER — OFFICE VISIT (OUTPATIENT)
Dept: ORTHOPEDICS | Facility: CLINIC | Age: 69
End: 2024-10-14
Payer: COMMERCIAL

## 2024-10-14 ENCOUNTER — HOSPITAL ENCOUNTER (OUTPATIENT)
Dept: MAMMOGRAPHY | Facility: CLINIC | Age: 69
Discharge: HOME | End: 2024-10-14
Attending: INTERNAL MEDICINE | Admitting: INTERNAL MEDICINE
Payer: COMMERCIAL

## 2024-10-14 VITALS
SYSTOLIC BLOOD PRESSURE: 129 MMHG | DIASTOLIC BLOOD PRESSURE: 78 MMHG | HEIGHT: 66 IN | WEIGHT: 164.1 LBS | HEART RATE: 87 BPM | BODY MASS INDEX: 26.37 KG/M2

## 2024-10-14 DIAGNOSIS — M75.42 SUBACROMIAL IMPINGEMENT, LEFT: ICD-10-CM

## 2024-10-14 DIAGNOSIS — Z12.31 VISIT FOR SCREENING MAMMOGRAM: ICD-10-CM

## 2024-10-14 DIAGNOSIS — M75.82 ROTATOR CUFF TENDONITIS, LEFT: Primary | ICD-10-CM

## 2024-10-14 PROCEDURE — 77063 BREAST TOMOSYNTHESIS BI: CPT | Mod: 52

## 2024-10-14 PROCEDURE — 99214 OFFICE O/P EST MOD 30 MIN: CPT | Performed by: STUDENT IN AN ORGANIZED HEALTH CARE EDUCATION/TRAINING PROGRAM

## 2024-10-14 NOTE — LETTER
10/14/2024      Lety Baldwin  [address]  [place] [address]      Dear Colleague,    Thank you for referring your patient, Lety Baldwin, to the [facility]. Please see a copy of my visit note below.    ASSESSMENT & PLAN    Lety was seen today for pain.    Diagnoses and all orders for this visit:    Rotator cuff tendonitis, left  -     MR Shoulder Left w/o Contrast; Future    Subacromial impingement, left  -     MR Shoulder Left w/o Contrast; Future      This issue is chronic and Unchanged.  Lety presents our clinic today to discuss her chronic left shoulder pain.  She reports that she got approximately 50 to 70% relief from the corticosteroid injection performed in July.  She has continued to participate in physical therapy, however notes that she continues to have pain with day-to-day activities.  We discussed that at this point we could trial another corticosteroid injection to the left subacromial bursa versus proceeding with advanced imaging, and after the discussion the patient wished to proceed with advanced imaging today.  MRI of the left shoulder is warranted as the patient has failed physician directed conservative therapy for greater than 6 weeks in the form of activity modifications, physical therapy, and a corticosteroid injection to the subacromial bursa.  We determined the following plan:  - MRI left shoulder ordered today  - She will continue with physical therapy as directed by her therapist  - She can use over-the-counter pain medicines, ice, heat as needed  - She will follow-up in our clinic after her MRI results are back to determine the next steps      Aki Patel DO  [facility]    SUBJECTIVE- Interim History October 14, 2024    Chief Complaint   Patient presents with     Left Shoulder - Pain       Lety Baldwin is a 69 year old female who is seen in f/u up for left shoulder. Since last visit  "on 7/22/24 patient has not improved enough feels 50 percent better and injection helped.PT is hurting when she is doing the exercises.  - Now ~ 7 months from initial onset    Worsened by: exercise,hurts at night and can not sleep and when laying on that side.  Better with: taking aleve and Ibuprofen  Treatments tried: ice, heat, ibuprofen, Aleve, and Voltaren helps  Associated symptoms:  discomfort    The patient is seen by themselves.  The patient is Right handed    Orthopedic/Surgical history: YES - Date: broken right leg and no surgery  Social History/Occupation:       REVIEW OF SYSTEMS:  Negative unless mentioned above    OBJECTIVE:  /78   Pulse 87   Ht 1.676 m (5' 6\")   Wt 74.4 kg (164 lb 1.6 oz)   LMP 01/01/2004 (Approximate)   BMI 26.49 kg/m     General: healthy, alert and in no distress  Skin: no suspicious lesions or rash.  CV: distal perfusion intact   Resp: normal respiratory effort without conversational dyspnea   Psych: normal mood and affect  Gait: NORMAL  Neuro: Normal light sensory exam of left upper extremity     RADIOLOGY:  No new images taken in clinic today      Again, thank you for allowing me to participate in the care of your patient.        Sincerely,        Aki Patel, DO  "

## 2024-10-14 NOTE — PROGRESS NOTES
ASSESSMENT & PLAN    Lety was seen today for pain.    Diagnoses and all orders for this visit:    Rotator cuff tendonitis, left  -     MR Shoulder Left w/o Contrast; Future    Subacromial impingement, left  -     MR Shoulder Left w/o Contrast; Future      This issue is chronic and Unchanged.  Lety presents our clinic today to discuss her chronic left shoulder pain.  She reports that she got approximately 50 to 70% relief from the corticosteroid injection performed in July.  She has continued to participate in physical therapy, however notes that she continues to have pain with day-to-day activities.  We discussed that at this point we could trial another corticosteroid injection to the left subacromial bursa versus proceeding with advanced imaging, and after the discussion the patient wished to proceed with advanced imaging today.  MRI of the left shoulder is warranted as the patient has failed physician directed conservative therapy for greater than 6 weeks in the form of activity modifications, physical therapy, and a corticosteroid injection to the subacromial bursa.  We determined the following plan:  - MRI left shoulder ordered today  - She will continue with physical therapy as directed by her therapist  - She can use over-the-counter pain medicines, ice, heat as needed  - She will follow-up in our clinic after her MRI results are back to determine the next steps      Aki Patel [facility] [place]    SUBJECTIVE- Interim History October 14, 2024    Chief Complaint   Patient presents with    Left Shoulder - Pain       Lety Baldwin is a 69 year old female who is seen in f/u up for left shoulder. Since last visit on 7/22/24 patient has not improved enough feels 50 percent better and injection helped.PT is hurting when she is doing the exercises.  - Now ~ 7 months from initial onset    Worsened by: exercise,hurts at night and can not sleep and when laying on that  "side.  Better with: taking aleve and Ibuprofen  Treatments tried: ice, heat, ibuprofen, Aleve, and Voltaren helps  Associated symptoms:  discomfort    The patient is seen by themselves.  The patient is Right handed    Orthopedic/Surgical history: YES - Date: broken right leg and no surgery  Social History/Occupation:       REVIEW OF SYSTEMS:  Negative unless mentioned above    OBJECTIVE:  /78   Pulse 87   Ht 1.676 m (5' 6\")   Wt 74.4 kg (164 lb 1.6 oz)   LMP 01/01/2004 (Approximate)   BMI 26.49 kg/m     General: healthy, alert and in no distress  Skin: no suspicious lesions or rash.  CV: distal perfusion intact   Resp: normal respiratory effort without conversational dyspnea   Psych: normal mood and affect  Gait: NORMAL  Neuro: Normal light sensory exam of left upper extremity     RADIOLOGY:  No new images taken in clinic today  "

## 2024-10-15 ENCOUNTER — MYC MEDICAL ADVICE (OUTPATIENT)
Dept: INTERNAL MEDICINE | Facility: CLINIC | Age: 69
End: 2024-10-15
Payer: COMMERCIAL

## 2024-10-15 NOTE — TELEPHONE ENCOUNTER
Sara with [facility] called to request Dr. Medina to sign the Ultrasound Breast order pended by them so they schedule the patient. Patient has been calling them, crying in frustration, wanting to schedule this ASAP.     Patient was told by our staff that the order was already signed but per Sara, it was not signed yet.       They will send the order to Iliana Tate for signature as Dr. Medina is out.   Thank you.

## 2024-10-16 ENCOUNTER — HOSPITAL ENCOUNTER (OUTPATIENT)
Dept: ULTRASOUND IMAGING | Facility: CLINIC | Age: 69
Discharge: HOME | End: 2024-10-16
Payer: COMMERCIAL

## 2024-10-16 DIAGNOSIS — R92.8 ABNORMAL MAMMOGRAM: ICD-10-CM

## 2024-10-16 PROCEDURE — 76642 ULTRASOUND BREAST LIMITED: CPT | Mod: LT

## 2024-10-17 ENCOUNTER — HOSPITAL ENCOUNTER (OUTPATIENT)
Facility: CLINIC | Age: 69
Discharge: HOME | End: 2024-10-17
Attending: INTERNAL MEDICINE | Admitting: INTERNAL MEDICINE
Payer: COMMERCIAL

## 2024-10-17 VITALS
WEIGHT: 164 LBS | OXYGEN SATURATION: 97 % | RESPIRATION RATE: 14 BRPM | HEIGHT: 66 IN | SYSTOLIC BLOOD PRESSURE: 114 MMHG | HEART RATE: 81 BPM | BODY MASS INDEX: 26.36 KG/M2 | DIASTOLIC BLOOD PRESSURE: 82 MMHG

## 2024-10-17 LAB — COLONOSCOPY: NORMAL

## 2024-10-17 PROCEDURE — G0105 COLORECTAL SCRN; HI RISK IND: HCPCS | Performed by: INTERNAL MEDICINE

## 2024-10-17 PROCEDURE — 250N000011 HC RX IP 250 OP 636: Performed by: INTERNAL MEDICINE

## 2024-10-17 PROCEDURE — G0500 MOD SEDAT ENDO SERVICE >5YRS: HCPCS | Performed by: INTERNAL MEDICINE

## 2024-10-17 PROCEDURE — 45378 DIAGNOSTIC COLONOSCOPY: CPT | Performed by: INTERNAL MEDICINE

## 2024-10-17 RX ORDER — NALOXONE HYDROCHLORIDE 0.4 MG/ML
0.4 INJECTION, SOLUTION INTRAMUSCULAR; INTRAVENOUS; SUBCUTANEOUS
Status: DISCONTINUED | OUTPATIENT
Start: 2024-10-17 | End: 2024-10-17 | Stop reason: HOSPADM

## 2024-10-17 RX ORDER — FLUMAZENIL 0.1 MG/ML
0.2 INJECTION, SOLUTION INTRAVENOUS
Status: DISCONTINUED | OUTPATIENT
Start: 2024-10-17 | End: 2024-10-17 | Stop reason: HOSPADM

## 2024-10-17 RX ORDER — FENTANYL CITRATE 50 UG/ML
50-100 INJECTION, SOLUTION INTRAMUSCULAR; INTRAVENOUS EVERY 5 MIN PRN
Status: DISCONTINUED | OUTPATIENT
Start: 2024-10-17 | End: 2024-10-17 | Stop reason: HOSPADM

## 2024-10-17 RX ORDER — NALOXONE HYDROCHLORIDE 0.4 MG/ML
0.2 INJECTION, SOLUTION INTRAMUSCULAR; INTRAVENOUS; SUBCUTANEOUS
Status: DISCONTINUED | OUTPATIENT
Start: 2024-10-17 | End: 2024-10-17 | Stop reason: HOSPADM

## 2024-10-17 RX ORDER — DIPHENHYDRAMINE HYDROCHLORIDE 50 MG/ML
25-50 INJECTION INTRAMUSCULAR; INTRAVENOUS
Status: DISCONTINUED | OUTPATIENT
Start: 2024-10-17 | End: 2024-10-17 | Stop reason: HOSPADM

## 2024-10-17 RX ORDER — ATROPINE SULFATE 0.1 MG/ML
1 INJECTION INTRAVENOUS
Status: DISCONTINUED | OUTPATIENT
Start: 2024-10-17 | End: 2024-10-17 | Stop reason: HOSPADM

## 2024-10-17 RX ORDER — ONDANSETRON 2 MG/ML
4 INJECTION INTRAMUSCULAR; INTRAVENOUS
Status: COMPLETED | OUTPATIENT
Start: 2024-10-17 | End: 2024-10-17

## 2024-10-17 RX ORDER — LIDOCAINE 40 MG/G
CREAM TOPICAL
Status: DISCONTINUED | OUTPATIENT
Start: 2024-10-17 | End: 2024-10-17 | Stop reason: HOSPADM

## 2024-10-17 RX ORDER — EPINEPHRINE 1 MG/ML
0.1 INJECTION, SOLUTION, CONCENTRATE INTRAVENOUS
Status: DISCONTINUED | OUTPATIENT
Start: 2024-10-17 | End: 2024-10-17 | Stop reason: HOSPADM

## 2024-10-17 RX ORDER — ONDANSETRON 2 MG/ML
4 INJECTION INTRAMUSCULAR; INTRAVENOUS EVERY 6 HOURS PRN
Status: DISCONTINUED | OUTPATIENT
Start: 2024-10-17 | End: 2024-10-17 | Stop reason: HOSPADM

## 2024-10-17 RX ORDER — ONDANSETRON 4 MG/1
4 TABLET, ORALLY DISINTEGRATING ORAL EVERY 6 HOURS PRN
Status: DISCONTINUED | OUTPATIENT
Start: 2024-10-17 | End: 2024-10-17 | Stop reason: HOSPADM

## 2024-10-17 RX ORDER — SIMETHICONE 40MG/0.6ML
133 SUSPENSION, DROPS(FINAL DOSAGE FORM)(ML) ORAL
Status: DISCONTINUED | OUTPATIENT
Start: 2024-10-17 | End: 2024-10-17 | Stop reason: HOSPADM

## 2024-10-17 RX ORDER — PROCHLORPERAZINE MALEATE 5 MG/1
5 TABLET ORAL EVERY 6 HOURS PRN
Status: DISCONTINUED | OUTPATIENT
Start: 2024-10-17 | End: 2024-10-17 | Stop reason: HOSPADM

## 2024-10-17 RX ADMIN — MIDAZOLAM 2 MG: 1 INJECTION INTRAMUSCULAR; INTRAVENOUS at 13:38

## 2024-10-17 RX ADMIN — ONDANSETRON 4 MG: 2 INJECTION INTRAMUSCULAR; INTRAVENOUS at 13:34

## 2024-10-17 RX ADMIN — FENTANYL CITRATE 50 MCG: 50 INJECTION, SOLUTION INTRAMUSCULAR; INTRAVENOUS at 13:47

## 2024-10-17 RX ADMIN — FENTANYL CITRATE 100 MCG: 50 INJECTION, SOLUTION INTRAMUSCULAR; INTRAVENOUS at 13:38

## 2024-10-17 RX ADMIN — MIDAZOLAM 1 MG: 1 INJECTION INTRAMUSCULAR; INTRAVENOUS at 13:47

## 2024-10-17 ASSESSMENT — ACTIVITIES OF DAILY LIVING (ADL)
ADLS_ACUITY_SCORE: 35
ADLS_ACUITY_SCORE: 35

## 2024-10-17 NOTE — H&P
Pre-Endoscopy History and Physical     Lety Baldwin MRN# [id number]   YOB: 1955 Age: 69 year old     Date of Procedure: 10/17/2024  Primary care provider: Mi Pereira  Type of Endoscopy: Colonoscopy with possible biopsy, possible polypectomy  Reason for Procedure: screen  Type of Anesthesia Anticipated: Conscious Sedation    HPI:    Lety is a 69 year old female who will be undergoing the above procedure.      A history and physical has been performed. The patient's medications and allergies have been reviewed. The risks and benefits of the procedure and the sedation options and risks were discussed with the patient.  All questions were answered and informed consent was obtained.      She denies a personal or family history of anesthesia complications or bleeding disorders.     Patient Active Problem List   Diagnosis    Personal history of malignant neoplasm of breast    Acquired hypothyroidism    Osteopenia - Fosamax 2007 -- 2012    CARDIOVASCULAR SCREENING; LDL GOAL LESS THAN 130    Rotator cuff tendonitis, left    Subacromial impingement, left    Neck pain        Past Medical History:   Diagnosis Date    Disorder of bone and cartilage, unspecified 2004    -2.0 in 2007    Fracture of metatarsal bone of right foot 5/15    Malignant neoplasm of breast (female), unspecified site 8/97    Ductal Carcinoma in situ right    Other specified acquired hypothyroidism     PONV (postoperative nausea and vomiting)         Past Surgical History:   Procedure Laterality Date    BREAST SURGERY  01/18/2018    bilat breast implant exchange    CHOLECYSTECTOMY      COLONOSCOPY N/A 10/8/2014    Procedure: COLONOSCOPY;  Surgeon: Jelani Moran MD;  Location:  GI    COLONOSCOPY N/A 10/4/2019    Procedure: COLONOSCOPY (fv);  Surgeon: Jelani Moran MD;  Location:  GI    ENDOSCOPIC RETROGRADE CHOLANGIOPANCREATOGRAM N/A 1/19/2018    Procedure: COMBINED ENDOSCOPIC RETROGRADE  CHOLANGIOPANCREATOGRAPHY, SPHINCTEROTOMY;  ENDOSCOPIC RETROGRADE CHOLANGIOPANCREATOGRAM (ERCP), SPHINCTEROTOMY, STONE EXTRACTION;  Surgeon: Patsy Mcclure MD;  Location:  OR    ENDOSCOPIC RETROGRADE CHOLANGIOPANCREATOGRAM N/A 1/19/2018    Procedure: COMBINED ENDOSCOPIC RETROGRADE CHOLANGIOPANCREATOGRAPHY, REMOVE STONE/BALLOON;;  Surgeon: Patsy Mcclure MD;  Location:  OR     BIOPSY OF BREAST, OPEN INCISIONAL  1992    Rt    HC DILATION/CURETTAGE DIAG/THER NON OB  1995    D & C    LAPAROSCOPIC CHOLECYSTECTOMY WITH CHOLANGIOGRAMS N/A 1/18/2018    Procedure: LAPAROSCOPIC CHOLECYSTECTOMY WITH CHOLANGIOGRAMS;;  Surgeon: Peter Winslow MD;  Location:  SD    RECONSTRUCT BREAST BILATERAL, IMPLANT PROSTHESIS BILATERAL, COMBINED Bilateral 1/18/2018    Procedure: COMBINED RECONSTRUCT BREAST BILATERAL, IMPLANT PROSTHESIS BILATERAL;  BILATERAL BREAST RECONSTRUCTION WITH EXCHANGE OF GEL IMPLANTS AND CAPSULOTOMIES, LAPAROSCOPIC CHOLECYSTECTOMY WITH CHOLANGIOGRAMS;  Surgeon: Derrell Diaz MD;  Location:  SD    REMOVE AND REPLACE BREAST IMPLANT PROSTHESIS Right 11/7/2019    Procedure: REVISION OF RIGHT BREAST RECONSTRUCTION WITH IMPLANT EXCHANGE WITH CAPSULOTOMIES;  Surgeon: Derrell Diaz MD;  Location:  OR    [facility] NONSPECIFIC PROCEDURE  9/00/97    Right Total Mastectomy- Reconstructive Surgery. Abstracted 5/15/02.       Social History     Tobacco Use    Smoking status: Never     Passive exposure: Never    Smokeless tobacco: Never   Substance Use Topics    Alcohol use: No       Family History   Problem Relation Age of Onset    Diabetes Father     Emphysema Father     Osteoporosis Father     Thyroid Disease Father     Hypertension Mother     Alzheimer Disease Mother     Lipids Mother     Cancer - colorectal Mother     Colon Cancer Mother 78        Cause of Death    Hyperlipidemia Mother     Arthritis Maternal Grandmother     C.A.D. Maternal Grandmother     Cancer - colorectal Maternal  "Grandfather     Alzheimer Disease Maternal Grandfather     Colon Cancer Maternal Grandfather     Cerebrovascular Disease Paternal Grandmother     Osteoporosis Paternal Grandmother     Thyroid Disease Paternal Grandmother        Prior to Admission medications    Medication Sig Start Date End Date Taking? Authorizing Provider   alendronate (FOSAMAX) 70 MG tablet Take 1 tablet (70 mg) by mouth every 7 days 4/16/24  Yes Mi Pereira MD   clobetasol (TEMOVATE) 0.05 % external ointment APPLY TWICE DAILY TO RASH ON THE BODY AS NEEDED ON TOP OF VANICREAM 4/30/24  Yes Reported, Patient   levothyroxine (SYNTHROID/LEVOTHROID) 150 MCG tablet Take 1 tablet (150 mcg) by mouth daily 1/5/24  Yes Mi Pereira MD   meloxicam (MOBIC) 7.5 MG tablet Take 1 tablet (7.5 mg) by mouth daily 7/9/24   Mi Pereira MD       Allergies   Allergen Reactions    Fesoterodine      Dizziness, dry mouth    Morphine And Codeine Nausea        REVIEW OF SYSTEMS:   5 point ROS negative except as noted above in HPI, including Gen., Resp., CV, GI &  system review.    PHYSICAL EXAM:   /83   Pulse 91   Resp 13   Ht 1.676 m (5' 6\")   Wt 74.4 kg (164 lb)   LMP 01/01/2004 (Approximate)   SpO2 98%   BMI 26.47 kg/m   Estimated body mass index is 26.47 kg/m  as calculated from the following:    Height as of this encounter: 1.676 m (5' 6\").    Weight as of this encounter: 74.4 kg (164 lb).   GENERAL APPEARANCE: alert, and oriented  MENTAL STATUS: alert  AIRWAY EXAM: Mallampatti Class I (visualization of the soft palate, fauces, uvula, anterior and posterior pillars)  RESP: lungs clear to auscultation - no rales, rhonchi or wheezes  CV: regular rates and rhythm  DIAGNOSTICS:    Not indicated    IMPRESSION   ASA Class 2 - Mild systemic disease    PLAN:   Plan for Colonoscopy with possible biopsy, possible polypectomy. We discussed the risks, benefits and alternatives and the patient wished to " proceed.    The above has been forwarded to the consulting provider.      Signed Electronically by: Jelani Moran MD  October 17, 2024

## 2024-10-18 ENCOUNTER — PATIENT OUTREACH (OUTPATIENT)
Dept: GASTROENTEROLOGY | Facility: CLINIC | Age: 69
End: 2024-10-18
Payer: COMMERCIAL

## 2024-10-31 ENCOUNTER — HOSPITAL ENCOUNTER (OUTPATIENT)
Dept: MRI IMAGING | Facility: CLINIC | Age: 69
Discharge: HOME | End: 2024-10-31
Attending: STUDENT IN AN ORGANIZED HEALTH CARE EDUCATION/TRAINING PROGRAM | Admitting: STUDENT IN AN ORGANIZED HEALTH CARE EDUCATION/TRAINING PROGRAM
Payer: COMMERCIAL

## 2024-10-31 DIAGNOSIS — M75.82 ROTATOR CUFF TENDONITIS, LEFT: ICD-10-CM

## 2024-10-31 DIAGNOSIS — M75.42 SUBACROMIAL IMPINGEMENT, LEFT: ICD-10-CM

## 2024-10-31 PROCEDURE — 73221 MRI JOINT UPR EXTREM W/O DYE: CPT | Mod: 26 | Performed by: RADIOLOGY

## 2024-10-31 PROCEDURE — 73221 MRI JOINT UPR EXTREM W/O DYE: CPT | Mod: LT

## 2024-11-04 ENCOUNTER — OFFICE VISIT (OUTPATIENT)
Dept: ORTHOPEDICS | Facility: CLINIC | Age: 69
End: 2024-11-04
Payer: COMMERCIAL

## 2024-11-04 VITALS
WEIGHT: 163.9 LBS | HEIGHT: 67 IN | DIASTOLIC BLOOD PRESSURE: 79 MMHG | HEART RATE: 78 BPM | SYSTOLIC BLOOD PRESSURE: 122 MMHG | BODY MASS INDEX: 25.72 KG/M2

## 2024-11-04 DIAGNOSIS — M75.02 ADHESIVE CAPSULITIS OF LEFT SHOULDER: Primary | ICD-10-CM

## 2024-11-04 PROCEDURE — 99213 OFFICE O/P EST LOW 20 MIN: CPT | Mod: 25 | Performed by: STUDENT IN AN ORGANIZED HEALTH CARE EDUCATION/TRAINING PROGRAM

## 2024-11-04 PROCEDURE — 20611 DRAIN/INJ JOINT/BURSA W/US: CPT | Mod: LT | Performed by: STUDENT IN AN ORGANIZED HEALTH CARE EDUCATION/TRAINING PROGRAM

## 2024-11-04 RX ORDER — LIDOCAINE HYDROCHLORIDE 10 MG/ML
5 INJECTION, SOLUTION INFILTRATION; PERINEURAL
Status: COMPLETED | OUTPATIENT
Start: 2024-11-04 | End: 2024-11-04

## 2024-11-04 RX ORDER — ROPIVACAINE HYDROCHLORIDE 5 MG/ML
2 INJECTION, SOLUTION EPIDURAL; INFILTRATION; PERINEURAL
Status: COMPLETED | OUTPATIENT
Start: 2024-11-04 | End: 2024-11-04

## 2024-11-04 RX ORDER — BETAMETHASONE SODIUM PHOSPHATE AND BETAMETHASONE ACETATE 3; 3 MG/ML; MG/ML
6 INJECTION, SUSPENSION INTRA-ARTICULAR; INTRALESIONAL; INTRAMUSCULAR; SOFT TISSUE
Status: COMPLETED | OUTPATIENT
Start: 2024-11-04 | End: 2024-11-04

## 2024-11-04 RX ADMIN — LIDOCAINE HYDROCHLORIDE 5 ML: 10 INJECTION, SOLUTION INFILTRATION; PERINEURAL at 13:44

## 2024-11-04 RX ADMIN — ROPIVACAINE HYDROCHLORIDE 2 ML: 5 INJECTION, SOLUTION EPIDURAL; INFILTRATION; PERINEURAL at 13:44

## 2024-11-04 RX ADMIN — BETAMETHASONE SODIUM PHOSPHATE AND BETAMETHASONE ACETATE 6 MG: 3; 3 INJECTION, SUSPENSION INTRA-ARTICULAR; INTRALESIONAL; INTRAMUSCULAR; SOFT TISSUE at 13:44

## 2024-11-04 NOTE — PROGRESS NOTES
ASSESSMENT & PLAN    Lety was seen today for recheck and musculoskeletal problem.    Diagnoses and all orders for this visit:    Adhesive capsulitis of left shoulder  -     Physical Therapy  Referral; Future  -     Large Joint Injection/Arthocentesis: L glenohumeral joint      This issue is acute and Worsening.  Lety presents our clinic today to follow-up on her known left shoulder pain.  We reviewed her MRI which as expected shows some tendinosis of the rotator cuff and biceps tendon.  It also showed signs of early adhesive capsulitis.  On examination, she does have pain with range of motion testing in all planes, however reassuringly she does not seem to have any total deficits in passive or active range of motion.  We discussed that this is consistent with early adhesive capsulitis.  We discussed that adhesive capsulitis is technically self-limited in nature, although it can take 1 to 2 years for it to run its course.  We discussed utilizing physical therapy and corticosteroid injections to help stave off any worsening of her adhesive capsulitis.  We determined the following plan:  - CSI to the left glenohumeral joint performed today under ultrasound guidance, see procedure note below for details  - Physical therapy referral placed.  Patient was encouraged to continue to work on range of motion until she gets in with formal PT.  She can otherwise use over-the-counter pain medicines, ice, and heat as needed  - She can follow-up for further evaluation and treatment in 6 to 8 weeks    Aki Patel DO  [facility] [place]    SUBJECTIVE- Interim History November 4, 2024    Chief Complaint   Patient presents with    Left Shoulder - RECHECK    Musculoskeletal Problem     MRI follow up       Lety Baldwin is a 69 year old female who is seen in f/u up for  left shoulder MRI results. Since last visit on 10/14/24 patient has hurts and waking patient up at night and not  "going to pt right now.  - Now ~ 7/22/24 the pain started 1 month before appointment from initial onset    Worsened by: raising arm  Better with: ibuprofen  Treatments tried: ibuprofen, home exercises, physical therapy (9 visits), previous imaging (MRI 10/31/24 and xray 7/22/2024), and corticosteroid injection (most recent date: never made pain go away) that provided improved pain level hour(s) of relief  Associated symptoms:  no distal numbness or tingling; denies swelling or warmth    The patient is seen by themselves.  The patient is Right handed    Orthopedic/Surgical history: NO  Social History/Occupation:       REVIEW OF SYSTEMS:  Negative unless mentioned above    OBJECTIVE:  /79   Pulse 78   Ht 1.689 m (5' 6.5\")   Wt 74.3 kg (163 lb 14.4 oz)   LMP 01/01/2004 (Approximate)   BMI 26.06 kg/m     General: healthy, alert and in no distress  Skin: no suspicious lesions or rash.  CV: distal perfusion intact   Resp: normal respiratory effort without conversational dyspnea   Psych: normal mood and affect  Gait: NORMAL  Neuro: Normal light sensory exam of NELLI extremity     Shoulder Examination (focused):   Left  Right     Skin intact intact   Inspection No erythema, ecchymosis  No erythema, ecchymosis    Palpation Tenderness: None Tenderness: None   Range of Motion (active) Forward flexion:175 painful  GH Abduction: 90 painful  Reach behind back: T10 painful  Forward flexion:175   GH Abduction: 90  Reach behind back: T10   Range of Motion (passive) Forward flexion:175   GH Abduction: 90  ER at side: 70  ER at 90 deg abduction: 90  IR at 90 deg abduction: 60    All motions painful  Forward flexion:175   GH Abduction: 90  ER at side: 70  ER at 90 deg abduction: 90  IR at 90 deg abduction: 60   Crepitus No No   Strength Internal Rotation at side: 5+  External Rotation at side: 5+  Belly Press: 5+ Internal Rotation at side: 5+  External Rotation at side: 5+  Belly Press: 5+    Special Tests Lea: " NP  Neer: NP  Luz Marina: 5+  Speed's: 5+  Cross arm: Negative  Lea: NP  Neer: NP  Luz Marina: 5+  Speed's: 5+  Cross arm: Negative      Other Positive Tests/ Notable Findings O'yuliana's: NP  Bicep Load I/II: NP  Internal Impingement: NP  Yergason: NP   ERLS: NP  Hornblower: NP  Bear Hug: NP O'yuliana's: NP  Bicep Load I/II: NP  Internal Impingement: NP  Yergason: NP   ERLS: NP  Hornblower: NP  Bear Hug: NP   Instability Generalized laxity: no  Anterior apprehension: Negative  Load and shift (anterior): NP  Load and shift (posterior): NP Generalized laxity: no  Anterior apprehension: Negative  Load and shift (anterior): NP  Load and shift (posterior): NP   Neurologic No abnormal sensation.   Scapular Motion Normal scapular alignment bilaterally without notable protraction/retraction, elevation/depression  No dynamic evidence of scapular dyskinesia          RADIOLOGY:  Final results and radiologist's interpretation, available in the [facility] health record.  Images were reviewed with the patient in the office today.  Impression:  1. Imaging findings may be seen in the clinical entity of adhesive  capsulitis. Please correlate clinically.  2. Focal low-grade intrasubstance tear of supraspinatus anterior  fibers at the footprint. No high-grade rotator cuff tear or muscle  atrophy.   *  Previously radiographically noted faint focus of mineralization is  not identified on current study, which may be due to interim  resorption (calcific tendinitis) or  related to limitation of MRI  technique.  3. Biceps long head tendinosis.  4. Suspect glenoid chondral fissuring with subtle subchondral edema.  5. Query mild subacromial/subdeltoid bursitis.    Large Joint Injection/Arthocentesis: L glenohumeral joint    Date/Time: 11/4/2024 1:44 PM    Performed by: Aki Patel DO  Authorized by: Aki Patel DO    Indications:  Pain and osteoarthritis  Needle Size:  22 G  Guidance: ultrasound    Approach:  Posterior  Location:   Shoulder      Site:  L glenohumeral joint  Medications:  6 mg betamethasone acet & sod phos 6 (3-3) MG/ML; 5 mL lidocaine 1 %; 2 mL ROPivacaine 5 MG/ML  Medications comment:  1ml of 8.4% Sodium Bicarbonate solution was used to buffer the local numbing agent for today's injection    Outcome:  Tolerated well, no immediate complications  Procedure discussed: discussed risks, benefits, and alternatives    Consent Given by:  Patient  Timeout: timeout called immediately prior to procedure    Prep: patient was prepped and draped in usual sterile fashion     Ultrasound was used to ensure safe and accurate needle placement and injection. Ultrasound images of the procedure were permanently stored.

## 2024-11-04 NOTE — LETTER
11/4/2024      Lety Baldwin  [address]  [place] [address]      Dear Colleague,    Thank you for referring your patient, Lety Baldwin, to the [facility]. Please see a copy of my visit note below.    ASSESSMENT & PLAN    Lety was seen today for recheck and musculoskeletal problem.    Diagnoses and all orders for this visit:    Adhesive capsulitis of left shoulder  -     Physical Therapy  Referral; Future  -     Large Joint Injection/Arthocentesis: L glenohumeral joint      This issue is acute and Worsening.  Lety presents our clinic today to follow-up on her known left shoulder pain.  We reviewed her MRI which as expected shows some tendinosis of the rotator cuff and biceps tendon.  It also showed signs of early adhesive capsulitis.  On examination, she does have pain with range of motion testing in all planes, however reassuringly she does not seem to have any total deficits in passive or active range of motion.  We discussed that this is consistent with early adhesive capsulitis.  We discussed that adhesive capsulitis is technically self-limited in nature, although it can take 1 to 2 years for it to run its course.  We discussed utilizing physical therapy and corticosteroid injections to help stave off any worsening of her adhesive capsulitis.  We determined the following plan:  - CSI to the left glenohumeral joint performed today under ultrasound guidance, see procedure note below for details  - Physical therapy referral placed.  Patient was encouraged to continue to work on range of motion until she gets in with formal PT.  She can otherwise use over-the-counter pain medicines, ice, and heat as needed  - She can follow-up for further evaluation and treatment in 6 to 8 weeks    Aki Patel,   [facility]    SUBJECTIVE- Interim History November 4, 2024    Chief Complaint   Patient presents with     Left  "Shoulder - RECHECK     Musculoskeletal Problem     MRI follow up       Lety Baldwin is a 69 year old female who is seen in f/u up for  left shoulder MRI results. Since last visit on 10/14/24 patient has hurts and waking patient up at night and not going to pt right now.  - Now ~ 7/22/24 the pain started 1 month before appointment from initial onset    Worsened by: raising arm  Better with: ibuprofen  Treatments tried: ibuprofen, home exercises, physical therapy (9 visits), previous imaging (MRI 10/31/24 and xray 7/22/2024), and corticosteroid injection (most recent date: never made pain go away) that provided improved pain level hour(s) of relief  Associated symptoms:  no distal numbness or tingling; denies swelling or warmth    The patient is seen by themselves.  The patient is Right handed    Orthopedic/Surgical history: NO  Social History/Occupation:       REVIEW OF SYSTEMS:  Negative unless mentioned above    OBJECTIVE:  /79   Pulse 78   Ht 1.689 m (5' 6.5\")   Wt 74.3 kg (163 lb 14.4 oz)   LMP 01/01/2004 (Approximate)   BMI 26.06 kg/m     General: healthy, alert and in no distress  Skin: no suspicious lesions or rash.  CV: distal perfusion intact   Resp: normal respiratory effort without conversational dyspnea   Psych: normal mood and affect  Gait: NORMAL  Neuro: Normal light sensory exam of NELLI extremity     Shoulder Examination (focused):   Left  Right     Skin intact intact   Inspection No erythema, ecchymosis  No erythema, ecchymosis    Palpation Tenderness: None Tenderness: None   Range of Motion (active) Forward flexion:175 painful  GH Abduction: 90 painful  Reach behind back: T10 painful  Forward flexion:175   GH Abduction: 90  Reach behind back: T10   Range of Motion (passive) Forward flexion:175   GH Abduction: 90  ER at side: 70  ER at 90 deg abduction: 90  IR at 90 deg abduction: 60    All motions painful  Forward flexion:175   GH Abduction: 90  ER at side: 70  ER at 90 deg " abduction: 90  IR at 90 deg abduction: 60   Crepitus No No   Strength Internal Rotation at side: 5+  External Rotation at side: 5+  Belly Press: 5+ Internal Rotation at side: 5+  External Rotation at side: 5+  Belly Press: 5+    Special Tests Lea: NP  Neer: NP  Luz Marina: 5+  Speed's: 5+  Cross arm: Negative  Lea: NP  Neer: NP  Luz Marina: 5+  Speed's: 5+  Cross arm: Negative      Other Positive Tests/ Notable Findings O'yuliana's: NP  Bicep Load I/II: NP  Internal Impingement: NP  Yergason: NP   ERLS: NP  Hornblower: NP  Bear Hug: NP O'yuliana's: NP  Bicep Load I/II: NP  Internal Impingement: NP  Yergason: NP   ERLS: NP  Hornblower: NP  Bear Hug: NP   Instability Generalized laxity: no  Anterior apprehension: Negative  Load and shift (anterior): NP  Load and shift (posterior): NP Generalized laxity: no  Anterior apprehension: Negative  Load and shift (anterior): NP  Load and shift (posterior): NP   Neurologic No abnormal sensation.   Scapular Motion Normal scapular alignment bilaterally without notable protraction/retraction, elevation/depression  No dynamic evidence of scapular dyskinesia          RADIOLOGY:  Final results and radiologist's interpretation, available in the [facility] health record.  Images were reviewed with the patient in the office today.  Impression:  1. Imaging findings may be seen in the clinical entity of adhesive  capsulitis. Please correlate clinically.  2. Focal low-grade intrasubstance tear of supraspinatus anterior  fibers at the footprint. No high-grade rotator cuff tear or muscle  atrophy.   *  Previously radiographically noted faint focus of mineralization is  not identified on current study, which may be due to interim  resorption (calcific tendinitis) or  related to limitation of MRI  technique.  3. Biceps long head tendinosis.  4. Suspect glenoid chondral fissuring with subtle subchondral edema.  5. Query mild subacromial/subdeltoid bursitis.    Large Joint Injection/Arthocentesis: L  glenohumeral joint    Date/Time: 11/4/2024 1:44 PM    Performed by: Aki Patel DO  Authorized by: Aki Patel DO    Indications:  Pain and osteoarthritis  Needle Size:  22 G  Guidance: ultrasound    Approach:  Posterior  Location:  Shoulder      Site:  L glenohumeral joint  Medications:  6 mg betamethasone acet & sod phos 6 (3-3) MG/ML; 5 mL lidocaine 1 %; 2 mL ROPivacaine 5 MG/ML  Medications comment:  1ml of 8.4% Sodium Bicarbonate solution was used to buffer the local numbing agent for today's injection    Outcome:  Tolerated well, no immediate complications  Procedure discussed: discussed risks, benefits, and alternatives    Consent Given by:  Patient  Timeout: timeout called immediately prior to procedure    Prep: patient was prepped and draped in usual sterile fashion     Ultrasound was used to ensure safe and accurate needle placement and injection. Ultrasound images of the procedure were permanently stored.               Again, thank you for allowing me to participate in the care of your patient.        Sincerely,        Aki Patel DO

## 2024-12-29 DIAGNOSIS — E03.9 ACQUIRED HYPOTHYROIDISM: ICD-10-CM

## 2024-12-30 RX ORDER — LEVOTHYROXINE SODIUM 150 UG/1
150 TABLET ORAL DAILY
Qty: 90 TABLET | Refills: 0 | Status: SHIPPED | OUTPATIENT
Start: 2024-12-30

## 2025-01-27 ENCOUNTER — PATIENT OUTREACH (OUTPATIENT)
Dept: INTERNAL MEDICINE | Facility: CLINIC | Age: 70
End: 2025-01-27
Payer: COMMERCIAL

## 2025-01-27 NOTE — TELEPHONE ENCOUNTER
Patient Quality Outreach    Patient is due for the following:   UP to date    Action(s) Taken:   No follow up needed at this time.    Type of outreach:    Chart review performed, no outreach needed.    Questions for provider review:    None           Eunice Woods LPN  Chart routed to none.

## 2025-02-18 ENCOUNTER — TRANSFERRED RECORDS (OUTPATIENT)
Dept: HEALTH INFORMATION MANAGEMENT | Facility: CLINIC | Age: 70
End: 2025-02-18
Payer: COMMERCIAL

## (undated) DEVICE — SOL NACL 0.9% IRRIG 1000ML BOTTLE 2F7124

## (undated) DEVICE — PEN MARKING SKIN

## (undated) DEVICE — GLOVE PROTEXIS W/NEU-THERA 6.5  2D73TE65

## (undated) DEVICE — ESU CORD MONOPOLAR 10'  E0510

## (undated) DEVICE — SYR 30ML LL W/O NDL 302832

## (undated) DEVICE — DRAPE LEGGINGS 8421

## (undated) DEVICE — CONNECTOR STOPCOCK 3 WAY MALE LL HI-FLO MX9311L

## (undated) DEVICE — LINEN TOWEL PACK X5 5464

## (undated) DEVICE — ESU ELEC BLADE 6" COATED E1450-6

## (undated) DEVICE — DRSG STERI STRIP 1/2X4" R1547

## (undated) DEVICE — SU VICRYL 2-0 FS-1 27" UND  J443H

## (undated) DEVICE — ENDO TROCAR 05MM VERSAONE BLADELESS W/STD FIX CAN NONB5STF

## (undated) DEVICE — NDL CANNULA INTERLINK BLUNT 18GA

## (undated) DEVICE — LINEN TOWEL PACK X10 5473

## (undated) DEVICE — NDL FILL KIT 21GA 1.75" W/11" TUBING 7M2802

## (undated) DEVICE — DRSG KERLIX 4 1/2"X4YDS ROLL 6715

## (undated) DEVICE — SOL NACL 0.9% IRRIG 1000ML BOTTLE 07138-09

## (undated) DEVICE — KIT ENDO TURNOVER/PROCEDURE W/CLEAN A SCOPE LINERS 103888

## (undated) DEVICE — DRAPE SHEET REV FOLD 3/4 9349

## (undated) DEVICE — NDL SPINAL 22GA 3.5" QUINCKE 405181

## (undated) DEVICE — TUBING IV EXTENSION SET ANESTHESIA 34" MLL 2C6227

## (undated) DEVICE — ENDO TROCAR BLUNT 100MM W/THRD ANCHOR BLUNTPORT BPT12STS

## (undated) DEVICE — RAD RX ISOVUE 300 (50ML) 61% IOPAMIDOL CHARGE PER ML

## (undated) DEVICE — GOWN XLG DISP 9545

## (undated) DEVICE — ESU GROUND PAD ADULT W/CORD E7507

## (undated) DEVICE — PACK MAJOR SBA15MAFSI

## (undated) DEVICE — GLOVE PROTEXIS W/NEU-THERA 7.0  2D73TE70

## (undated) DEVICE — SOL NACL 0.9% INJ 250ML BAG 2B1322Q

## (undated) DEVICE — ESU PENCIL W/SMOKE EVAC E2515HS

## (undated) DEVICE — PREP SKIN SCRUB TRAY 4461A

## (undated) DEVICE — WIPES FOLEY CARE SURESTEP PROVON DFC100

## (undated) DEVICE — SU VICRYL 0 UR-6 27" J603H

## (undated) DEVICE — GLOVE PROTEXIS W/NEU-THERA 8.0  2D73TE80

## (undated) DEVICE — SU MONOCRYL 3-0 PS-2 27" Y427H

## (undated) DEVICE — ENDO CANNULA 05MM VERSAONE UNIVERSAL UNVCA5STF

## (undated) DEVICE — BNDG ELASTIC 6" DBL LENGTH UNSTERILE 6611-16

## (undated) DEVICE — GLOVE PROTEXIS W/NEU-THERA 7.5  2D73TE75

## (undated) DEVICE — SUCTION IRR STRYKERFLOW II W/TIP 250-070-520

## (undated) DEVICE — SUCTION CANISTER MEDIVAC LINER 3000ML W/LID 65651-530

## (undated) DEVICE — BLADE KNIFE SURG 10 371110

## (undated) DEVICE — DRAPE BREAST/CHEST 29420

## (undated) DEVICE — PACK LAP CHOLE SLC15LCFSD

## (undated) DEVICE — DRAPE C-ARM 60X42" 1013

## (undated) DEVICE — CATH TRAY FOLEY SURESTEP 16FR WDRAIN BAG STLK LATEX A300316A

## (undated) DEVICE — DRSG GAUZE 4X4" 3033

## (undated) DEVICE — SU VICRYL 4-0 PS-2 18" UND J496H

## (undated) DEVICE — PAD CHUX UNDERPAD 23X24" 7136

## (undated) DEVICE — SOL NACL 0.9% INJ 1000ML BAG 2B1324X

## (undated) DEVICE — CATH CHOLANGIOGRAM 4.5FR TAUT METAL TIP 20018-M55

## (undated) DEVICE — PREP CHLORAPREP 26ML TINTED ORANGE  260815

## (undated) DEVICE — SYR 10ML SLIP TIP W/O NDL

## (undated) DEVICE — ENDO POUCH REIACATCH 2.44" 10MM CATCH10

## (undated) DEVICE — GLOVE PROTEXIS BLUE W/NEU-THERA 8.0  2D73EB80

## (undated) DEVICE — CLIP APPLIER ENDO 05MM MED/LG 176630

## (undated) DEVICE — SOL WATER IRRIG 1000ML BOTTLE 2F7114

## (undated) RX ORDER — INDOMETHACIN 50 MG/1
SUPPOSITORY RECTAL
Status: DISPENSED
Start: 2018-01-19

## (undated) RX ORDER — FENTANYL CITRATE 50 UG/ML
INJECTION, SOLUTION INTRAMUSCULAR; INTRAVENOUS
Status: DISPENSED
Start: 2024-10-17

## (undated) RX ORDER — BUPIVACAINE HYDROCHLORIDE AND EPINEPHRINE 2.5; 5 MG/ML; UG/ML
INJECTION, SOLUTION EPIDURAL; INFILTRATION; INTRACAUDAL; PERINEURAL
Status: DISPENSED
Start: 2019-11-07

## (undated) RX ORDER — CEFAZOLIN SODIUM 1 G/3ML
INJECTION, POWDER, FOR SOLUTION INTRAMUSCULAR; INTRAVENOUS
Status: DISPENSED
Start: 2018-01-18

## (undated) RX ORDER — PROPOFOL 10 MG/ML
INJECTION, EMULSION INTRAVENOUS
Status: DISPENSED
Start: 2018-01-18

## (undated) RX ORDER — FENTANYL CITRATE 50 UG/ML
INJECTION, SOLUTION INTRAMUSCULAR; INTRAVENOUS
Status: DISPENSED
Start: 2018-01-19

## (undated) RX ORDER — KETAMINE HYDROCHLORIDE 10 MG/ML
INJECTION, SOLUTION INTRAMUSCULAR; INTRAVENOUS
Status: DISPENSED
Start: 2018-01-18

## (undated) RX ORDER — SCOLOPAMINE TRANSDERMAL SYSTEM 1 MG/1
PATCH, EXTENDED RELEASE TRANSDERMAL
Status: DISPENSED
Start: 2019-11-07

## (undated) RX ORDER — PROPOFOL 10 MG/ML
INJECTION, EMULSION INTRAVENOUS
Status: DISPENSED
Start: 2018-01-19

## (undated) RX ORDER — CEFAZOLIN SODIUM 2 G/100ML
INJECTION, SOLUTION INTRAVENOUS
Status: DISPENSED
Start: 2019-11-07

## (undated) RX ORDER — FENTANYL CITRATE 50 UG/ML
INJECTION, SOLUTION INTRAMUSCULAR; INTRAVENOUS
Status: DISPENSED
Start: 2019-11-07

## (undated) RX ORDER — FENTANYL CITRATE 50 UG/ML
INJECTION, SOLUTION INTRAMUSCULAR; INTRAVENOUS
Status: DISPENSED
Start: 2018-01-18

## (undated) RX ORDER — ONDANSETRON 2 MG/ML
INJECTION INTRAMUSCULAR; INTRAVENOUS
Status: DISPENSED
Start: 2024-10-17

## (undated) RX ORDER — GLYCOPYRROLATE 0.2 MG/ML
INJECTION, SOLUTION INTRAMUSCULAR; INTRAVENOUS
Status: DISPENSED
Start: 2018-01-18

## (undated) RX ORDER — FENTANYL CITRATE 50 UG/ML
INJECTION, SOLUTION INTRAMUSCULAR; INTRAVENOUS
Status: DISPENSED
Start: 2019-10-04

## (undated) RX ORDER — SCOLOPAMINE TRANSDERMAL SYSTEM 1 MG/1
PATCH, EXTENDED RELEASE TRANSDERMAL
Status: DISPENSED
Start: 2018-01-19

## (undated) RX ORDER — DEXAMETHASONE SODIUM PHOSPHATE 4 MG/ML
INJECTION, SOLUTION INTRA-ARTICULAR; INTRALESIONAL; INTRAMUSCULAR; INTRAVENOUS; SOFT TISSUE
Status: DISPENSED
Start: 2018-01-18

## (undated) RX ORDER — LIDOCAINE HYDROCHLORIDE 20 MG/ML
INJECTION, SOLUTION EPIDURAL; INFILTRATION; INTRACAUDAL; PERINEURAL
Status: DISPENSED
Start: 2018-01-18

## (undated) RX ORDER — ONDANSETRON 2 MG/ML
INJECTION INTRAMUSCULAR; INTRAVENOUS
Status: DISPENSED
Start: 2018-01-18